# Patient Record
Sex: FEMALE | Race: BLACK OR AFRICAN AMERICAN | NOT HISPANIC OR LATINO | Employment: FULL TIME | URBAN - METROPOLITAN AREA
[De-identification: names, ages, dates, MRNs, and addresses within clinical notes are randomized per-mention and may not be internally consistent; named-entity substitution may affect disease eponyms.]

---

## 2017-01-03 ENCOUNTER — TELEPHONE (OUTPATIENT)
Dept: ORTHOPEDICS | Facility: CLINIC | Age: 57
End: 2017-01-03

## 2017-01-03 NOTE — TELEPHONE ENCOUNTER
Received a call from pt.   States she is ready to attend PT but now needs new orders for PT.   Pt would like to attend PT at [facility] and [facility].   Pending new orders from Zaina Stanley PA-C.

## 2017-01-03 NOTE — TELEPHONE ENCOUNTER
----- Message from Lele Brady sent at 1/3/2017  8:30 AM CST -----  Contact: self/home  Pt would like to speak with you regarding an updated PT referral.

## 2017-01-23 ENCOUNTER — PATIENT MESSAGE (OUTPATIENT)
Dept: INTERNAL MEDICINE | Facility: CLINIC | Age: 57
End: 2017-01-23

## 2017-01-23 ENCOUNTER — TELEPHONE (OUTPATIENT)
Dept: INTERNAL MEDICINE | Facility: CLINIC | Age: 57
End: 2017-01-23

## 2017-01-23 NOTE — TELEPHONE ENCOUNTER
Spoke with the patient and she states she has been having some hair loss, Patient states she has some things to treat her hair until her appointment. Termed the call

## 2017-01-23 NOTE — TELEPHONE ENCOUNTER
----- Message from Angelo Hernandez MA sent at 1/23/2017 11:19 AM CST -----  Contact: [phone]  Pt stated that she has had a lot of hair loss recently and she would like to speak with the Dr regarding this matter. Please advise and call. Thanks!

## 2017-01-31 ENCOUNTER — TELEPHONE (OUTPATIENT)
Dept: INTERNAL MEDICINE | Facility: CLINIC | Age: 57
End: 2017-01-31

## 2017-01-31 ENCOUNTER — LAB VISIT (OUTPATIENT)
Dept: LAB | Facility: HOSPITAL | Age: 57
End: 2017-01-31
Attending: INTERNAL MEDICINE
Payer: MEDICAID

## 2017-01-31 ENCOUNTER — OFFICE VISIT (OUTPATIENT)
Dept: INTERNAL MEDICINE | Facility: CLINIC | Age: 57
End: 2017-01-31
Payer: MEDICAID

## 2017-01-31 VITALS
SYSTOLIC BLOOD PRESSURE: 138 MMHG | WEIGHT: 171.5 LBS | DIASTOLIC BLOOD PRESSURE: 86 MMHG | HEART RATE: 70 BPM | HEIGHT: 64 IN | TEMPERATURE: 98 F | BODY MASS INDEX: 29.28 KG/M2 | RESPIRATION RATE: 16 BRPM

## 2017-01-31 DIAGNOSIS — R53.83 FATIGUE, UNSPECIFIED TYPE: ICD-10-CM

## 2017-01-31 DIAGNOSIS — R53.83 FATIGUE, UNSPECIFIED TYPE: Primary | ICD-10-CM

## 2017-01-31 DIAGNOSIS — Z11.59 NEED FOR HEPATITIS C SCREENING TEST: ICD-10-CM

## 2017-01-31 LAB
25(OH)D3+25(OH)D2 SERPL-MCNC: 23 NG/ML
ALBUMIN SERPL BCP-MCNC: 3.8 G/DL
ALP SERPL-CCNC: 75 U/L
ALT SERPL W/O P-5'-P-CCNC: 25 U/L
ANION GAP SERPL CALC-SCNC: 10 MMOL/L
AST SERPL-CCNC: 26 U/L
BASOPHILS # BLD AUTO: 0.01 K/UL
BASOPHILS NFR BLD: 0.1 %
BILIRUB SERPL-MCNC: 0.3 MG/DL
BUN SERPL-MCNC: 16 MG/DL
CALCIUM SERPL-MCNC: 10.5 MG/DL
CHLORIDE SERPL-SCNC: 101 MMOL/L
CO2 SERPL-SCNC: 28 MMOL/L
CREAT SERPL-MCNC: 0.8 MG/DL
DIFFERENTIAL METHOD: NORMAL
EOSINOPHIL # BLD AUTO: 0.1 K/UL
EOSINOPHIL NFR BLD: 1.1 %
ERYTHROCYTE [DISTWIDTH] IN BLOOD BY AUTOMATED COUNT: 13.9 %
EST. GFR  (AFRICAN AMERICAN): >60 ML/MIN/1.73 M^2
EST. GFR  (NON AFRICAN AMERICAN): >60 ML/MIN/1.73 M^2
FERRITIN SERPL-MCNC: 89 NG/ML
FOLATE SERPL-MCNC: 7.6 NG/ML
GLUCOSE SERPL-MCNC: 100 MG/DL
HCT VFR BLD AUTO: 38.2 %
HGB BLD-MCNC: 12.5 G/DL
IRON SERPL-MCNC: 99 UG/DL
LYMPHOCYTES # BLD AUTO: 3.3 K/UL
LYMPHOCYTES NFR BLD: 43.5 %
MCH RBC QN AUTO: 28.9 PG
MCHC RBC AUTO-ENTMCNC: 32.7 %
MCV RBC AUTO: 88 FL
MONOCYTES # BLD AUTO: 0.4 K/UL
MONOCYTES NFR BLD: 5.5 %
NEUTROPHILS # BLD AUTO: 3.8 K/UL
NEUTROPHILS NFR BLD: 49.7 %
PLATELET # BLD AUTO: 317 K/UL
PMV BLD AUTO: 9.6 FL
POTASSIUM SERPL-SCNC: 3.4 MMOL/L
PROT SERPL-MCNC: 7.8 G/DL
RBC # BLD AUTO: 4.33 M/UL
SATURATED IRON: 22 %
SODIUM SERPL-SCNC: 139 MMOL/L
TOTAL IRON BINDING CAPACITY: 459 UG/DL
TRANSFERRIN SERPL-MCNC: 310 MG/DL
TSH SERPL DL<=0.005 MIU/L-ACNC: 2.49 UIU/ML
VIT B12 SERPL-MCNC: >2000 PG/ML
WBC # BLD AUTO: 7.57 K/UL

## 2017-01-31 PROCEDURE — 82746 ASSAY OF FOLIC ACID SERUM: CPT

## 2017-01-31 PROCEDURE — 82728 ASSAY OF FERRITIN: CPT

## 2017-01-31 PROCEDURE — 36415 COLL VENOUS BLD VENIPUNCTURE: CPT | Mod: PO

## 2017-01-31 PROCEDURE — 86803 HEPATITIS C AB TEST: CPT

## 2017-01-31 PROCEDURE — 82607 VITAMIN B-12: CPT

## 2017-01-31 PROCEDURE — 84443 ASSAY THYROID STIM HORMONE: CPT

## 2017-01-31 PROCEDURE — 80053 COMPREHEN METABOLIC PANEL: CPT

## 2017-01-31 PROCEDURE — 99213 OFFICE O/P EST LOW 20 MIN: CPT | Mod: S$PBB,,, | Performed by: INTERNAL MEDICINE

## 2017-01-31 PROCEDURE — 85025 COMPLETE CBC W/AUTO DIFF WBC: CPT

## 2017-01-31 PROCEDURE — 83540 ASSAY OF IRON: CPT

## 2017-01-31 PROCEDURE — 82306 VITAMIN D 25 HYDROXY: CPT

## 2017-01-31 PROCEDURE — 99999 PR PBB SHADOW E&M-EST. PATIENT-LVL III: CPT | Mod: PBBFAC,,, | Performed by: INTERNAL MEDICINE

## 2017-01-31 NOTE — PROGRESS NOTES
CC: hair loss  HPI:  The patient is a 56 y.o. year old female who presents to the office for hair loss.  She reports significant hair loss over the last 6 months.  She also complains of fatigue.  The patient reports she has been under a lot of stress.    PAST MEDICAL HISTORY:  Past Medical History   Diagnosis Date    ALLERGIC RHINITIS     Anemia     Anxiety     Cancer      right neck    Transient elevated blood pressure        SURGICAL HISTORY:  Past Surgical History   Procedure Laterality Date    Cancer neck surgery      Tubal ligation         MEDS:  Medcard reviewed and updated    ALLERGIES: Allergy Card reviewed and updated    SOCIAL HISTORY:   The patient is a nonsmoker.    PE:   APPEARANCE: Well nourished, well developed, in no acute distress.    CHEST: Lungs clear to auscultation with unlabored respirations.  CARDIOVASCULAR: Normal S1, S2. No murmurs. No carotid bruits. No pedal edema.  ABDOMEN: Bowel sounds normal. Not distended. Soft. No tenderness or masses.   PSYCHIATRIC: The patient is oriented to person, place, and time and has a pleasant affect.        ASSESSMENT/PLAN:  Irene was seen today for hair loss.    Diagnoses and all orders for this visit:    Fatigue, unspecified type  -     TSH; Future  -     CBC auto differential; Future  -     Iron and TIBC; Future  -     Ferritin; Future  -     Vitamin B12; Future  -     Vitamin D; Future  -     Folate; Future

## 2017-01-31 NOTE — MR AVS SNAPSHOT
[facility]   [facility]  [place] [address]  Phone: [phone]  Fax: [phone]                  Irene Lawton   2017 11:40 AM   Office Visit    Description:  Female : 1960   Provider:  Aury Sequeira MD   Department:  [facility]           Reason for Visit     Hair Loss           Diagnoses this Visit        Comments    Fatigue, unspecified type    -  Primary            To Do List           Goals (5 Years of Data)     None      Follow-Up and Disposition     Return in about 3 months (around 2017).      [facility]     [facility] Care Line -  Assistance  Registered nurses in the [facility] On Call Center provide clinical advisement, health education, appointment booking, and other advisory services.  Call for this free service at [phone].             Medications           Message regarding Medications     Verify the changes and/or additions to your medication regime listed below are the same as discussed with your clinician today.  If any of these changes or additions are incorrect, please notify your healthcare provider.             Verify that the below list of medications is an accurate representation of the medications you are currently taking.  If none reported, the list may be blank. If incorrect, please contact your healthcare provider. Carry this list with you in case of emergency.           Current Medications     albuterol 90 mcg/actuation inhaler Inhale 2 puffs into the lungs every 6 (six) hours as needed (cough).    doxycycline (VIBRA-TABS) 100 MG tablet Take 1 tablet (100 mg total) by mouth 2 (two) times daily.    fexofenadine (ALLEGRA ODT) 30 mg disintegrating tablet Take by mouth.    fluticasone (FLONASE) 50 mcg/actuation nasal spray 1 spray by Each Nare route once daily.    hydrochlorothiazide (HYDRODIURIL) 25 MG tablet TAKE ONE TABLET BY MOUTH ONCE DAILY    ibuprofen (ADVIL,MOTRIN) 800 MG  "tablet Take 1 tablet (800 mg total) by mouth 2 (two) times daily as needed for Pain.    MULTIVITAMIN ORAL Take by mouth.           Clinical Reference Information           Vital Signs - Last Recorded  Most recent update: 1/31/2017 12:07 PM by Jil Titus LPN    BP Pulse Temp Resp Ht Wt    138/86 (BP Location: Left arm, Patient Position: Sitting, BP Method: Manual) 70 98.2 °F (36.8 °C) (Oral) 16 5' 4" (1.626 m) 77.8 kg (171 lb 8.3 oz)    LMP BMI             06/27/2012 29.44 kg/m2         Blood Pressure          Most Recent Value    BP  138/86      Allergies as of 1/31/2017     No Known Allergies      Immunizations Administered on Date of Encounter - 1/31/2017     None      Orders Placed During Today's Visit     Future Labs/Procedures Expected by Expires    CBC auto differential  1/31/2017 4/1/2018    Ferritin  1/31/2017 1/31/2018    Folate  1/31/2017 4/1/2018    Iron and TIBC  1/31/2017 1/31/2018    TSH  1/31/2017 4/1/2018    Vitamin B12  1/31/2017 4/1/2018    Vitamin D  1/31/2017 1/31/2018      "

## 2017-01-31 NOTE — TELEPHONE ENCOUNTER
----- Message from Kirill Katz sent at 1/31/2017 11:50 AM CST -----  Contact: self [phone]  Pt in traffic going to be late, pt was advise that will be up to doctor to see her

## 2017-02-01 LAB — HCV AB SERPL QL IA: NEGATIVE

## 2017-02-03 ENCOUNTER — TELEPHONE (OUTPATIENT)
Dept: INTERNAL MEDICINE | Facility: CLINIC | Age: 57
End: 2017-02-03

## 2017-02-03 NOTE — TELEPHONE ENCOUNTER
Please informed patient that labs are significant for mildly depressed vitamin D and potassium level.  Recommend patient drink orange juice daily or eat a banana daily to replace potassium.  The decrease potassium is due to hydrochlorothiazide.  Also, recommend patient take vitamin D3 1000 international units daily.  Vitamin B12 is supratherapeutic.  No intervention is required.  Vitamin B12 is water-soluble, and excess will be eliminated in the urine.

## 2017-02-03 NOTE — TELEPHONE ENCOUNTER
----- Message from Kirill Katz sent at 2/3/2017  9:29 AM CST -----  Contact: self   Pt state she didn't understand her test result 1/31/17 and will like to discuss results or get a clear understanding.    Please advise pt

## 2017-03-09 ENCOUNTER — PATIENT MESSAGE (OUTPATIENT)
Dept: INTERNAL MEDICINE | Facility: CLINIC | Age: 57
End: 2017-03-09

## 2017-03-10 ENCOUNTER — TELEPHONE (OUTPATIENT)
Dept: ORTHOPEDICS | Facility: CLINIC | Age: 57
End: 2017-03-10

## 2017-03-10 ENCOUNTER — DOCUMENTATION ONLY (OUTPATIENT)
Dept: ORTHOPEDICS | Facility: CLINIC | Age: 57
End: 2017-03-10

## 2017-03-10 DIAGNOSIS — M25.562 KNEE PAIN, LEFT ANTERIOR: Primary | ICD-10-CM

## 2017-03-10 RX ORDER — AZITHROMYCIN 250 MG/1
250 TABLET, FILM COATED ORAL DAILY
Qty: 6 TABLET | Refills: 0 | Status: SHIPPED | OUTPATIENT
Start: 2017-03-10 | End: 2017-03-15

## 2017-03-10 RX ORDER — FLUTICASONE PROPIONATE 50 MCG
1 SPRAY, SUSPENSION (ML) NASAL DAILY
Qty: 1 BOTTLE | Refills: 3 | Status: SHIPPED | OUTPATIENT
Start: 2017-03-10 | End: 2017-09-13 | Stop reason: SDUPTHER

## 2017-03-10 NOTE — TELEPHONE ENCOUNTER
----- Message from Brady Monzon sent at 3/10/2017 10:30 AM CST -----  Contact: patient  Pt request a call regarding questions about her physical therapy referral

## 2017-03-10 NOTE — PROGRESS NOTES
Therapy orders fax to [facility] office # [phone] fax # [phone]. Done.

## 2017-03-13 ENCOUNTER — TELEPHONE (OUTPATIENT)
Dept: ORTHOPEDICS | Facility: CLINIC | Age: 57
End: 2017-03-13

## 2017-03-13 NOTE — TELEPHONE ENCOUNTER
----- Message from Lele Brady sent at 3/13/2017  2:14 PM CDT -----  Contact: self/home  Pt called in stating that her insurance has changed to medicaid and the facility where her referral was sent does not accept her insurance. Pt also stated that the she would like the referral to be sent to another facility who would accept her insurance.

## 2017-03-13 NOTE — TELEPHONE ENCOUNTER
Spoke with pt.   Pt will locate a facility that accepts her insurance and advise us of the name of facility.

## 2017-03-15 ENCOUNTER — PATIENT MESSAGE (OUTPATIENT)
Dept: ORTHOPEDICS | Facility: CLINIC | Age: 57
End: 2017-03-15

## 2017-04-04 ENCOUNTER — TELEPHONE (OUTPATIENT)
Dept: INTERNAL MEDICINE | Facility: CLINIC | Age: 57
End: 2017-04-04

## 2017-04-04 NOTE — TELEPHONE ENCOUNTER
----- Message from Shani Orellana sent at 4/4/2017  4:00 PM CDT -----  Contact: call [phone]  Patient would like to get a referral.  Does the patient already have the specialty clinic appointment scheduled:    If yes, what date is the appointment scheduled:     Referral to what specialty:  Physical therapy   Reason (be specific):  Knee  Left   Does the patient want the referral with a specific physician:    Is this an [facility]:    Comments:  Pt needs a location that will accept her medicaid insurance

## 2017-04-04 NOTE — TELEPHONE ENCOUNTER
Called and spoke with the patient and she states she will try and fine the Ortho online that accepts her insurance and she will also check other resources. Termed the call

## 2017-04-07 ENCOUNTER — TELEPHONE (OUTPATIENT)
Dept: ORTHOPEDICS | Facility: CLINIC | Age: 57
End: 2017-04-07

## 2017-04-07 NOTE — TELEPHONE ENCOUNTER
Therapy orders fax to [facility] [phone]. Pt states that [facility] will accept her insurance. Done.

## 2017-04-07 NOTE — TELEPHONE ENCOUNTER
----- Message from Mariya Velasco MA sent at 4/7/2017 11:48 AM CDT -----  Contact: self/home      ----- Message -----     From: Lele Brady     Sent: 4/7/2017  10:40 AM       To: Jaden Mahajan Staff    Pt would like a request to be faxed over to [facility]. The fax number is [phone]. Pt was unsure what type of request needed to be faxed over.

## 2017-04-13 ENCOUNTER — TELEPHONE (OUTPATIENT)
Dept: ORTHOPEDICS | Facility: CLINIC | Age: 57
End: 2017-04-13

## 2017-04-13 NOTE — TELEPHONE ENCOUNTER
Notified pt that we fax new orders for therapy to [facility] fax # [phone]. Patient states verbal understanding and has no further questions.

## 2017-04-13 NOTE — TELEPHONE ENCOUNTER
----- Message from Mariya Velasco MA sent at 4/13/2017  2:05 PM CDT -----  Contact: SELF      ----- Message -----     From: Brady Monzon     Sent: 4/13/2017   1:47 PM       To: Jaden Mahajan Staff    Pt request a call

## 2017-04-17 ENCOUNTER — DOCUMENTATION ONLY (OUTPATIENT)
Dept: ORTHOPEDICS | Facility: CLINIC | Age: 57
End: 2017-04-17

## 2017-04-17 DIAGNOSIS — M25.562 KNEE PAIN, LEFT ANTERIOR: Primary | ICD-10-CM

## 2017-04-21 RX ORDER — HYDROCHLOROTHIAZIDE 25 MG/1
TABLET ORAL
Qty: 30 TABLET | Refills: 3 | Status: SHIPPED | OUTPATIENT
Start: 2017-04-21 | End: 2017-08-25 | Stop reason: SDUPTHER

## 2017-04-26 ENCOUNTER — TELEPHONE (OUTPATIENT)
Dept: ORTHOPEDICS | Facility: CLINIC | Age: 57
End: 2017-04-26

## 2017-04-26 NOTE — TELEPHONE ENCOUNTER
----- Message from Jannette Clarke sent at 4/26/2017 12:06 PM CDT -----  Contact: [facility]  Kirsten received the referral for pt and is requesting any notes regarding the patient. Contact information: [phone] Fax: [phone]

## 2017-07-13 ENCOUNTER — TELEPHONE (OUTPATIENT)
Dept: INTERNAL MEDICINE | Facility: CLINIC | Age: 57
End: 2017-07-13

## 2017-07-13 ENCOUNTER — PATIENT MESSAGE (OUTPATIENT)
Dept: INTERNAL MEDICINE | Facility: CLINIC | Age: 57
End: 2017-07-13

## 2017-07-13 DIAGNOSIS — Z12.11 COLON CANCER SCREENING: ICD-10-CM

## 2017-07-13 DIAGNOSIS — Z12.31 VISIT FOR SCREENING MAMMOGRAM: Primary | ICD-10-CM

## 2017-07-13 NOTE — TELEPHONE ENCOUNTER
Called to advise the patient I have sent the request to Dr Sequeira and there was no answer termed the call

## 2017-07-13 NOTE — TELEPHONE ENCOUNTER
----- Message from Susi Vogel sent at 7/11/2017  2:16 PM CDT -----  Contact: Pt  Pt called to speak to the nurse to request an order for her annual mammogram and colonoscopy and would like a call back once the order has been put into the system.    Pt can be reached [phone].    Thanks

## 2017-07-14 ENCOUNTER — PATIENT MESSAGE (OUTPATIENT)
Dept: INTERNAL MEDICINE | Facility: CLINIC | Age: 57
End: 2017-07-14

## 2017-07-17 ENCOUNTER — TELEPHONE (OUTPATIENT)
Dept: INTERNAL MEDICINE | Facility: CLINIC | Age: 57
End: 2017-07-17

## 2017-07-17 ENCOUNTER — PATIENT MESSAGE (OUTPATIENT)
Dept: INTERNAL MEDICINE | Facility: CLINIC | Age: 57
End: 2017-07-17

## 2017-07-21 ENCOUNTER — TELEPHONE (OUTPATIENT)
Dept: ENDOSCOPY | Facility: HOSPITAL | Age: 57
End: 2017-07-21

## 2017-07-28 ENCOUNTER — HOSPITAL ENCOUNTER (OUTPATIENT)
Dept: RADIOLOGY | Facility: HOSPITAL | Age: 57
Discharge: HOME | End: 2017-07-28
Attending: INTERNAL MEDICINE
Payer: MEDICAID

## 2017-07-28 VITALS — WEIGHT: 171 LBS | BODY MASS INDEX: 29.19 KG/M2 | HEIGHT: 64 IN

## 2017-07-28 DIAGNOSIS — Z12.31 VISIT FOR SCREENING MAMMOGRAM: ICD-10-CM

## 2017-07-28 PROCEDURE — 77063 BREAST TOMOSYNTHESIS BI: CPT | Mod: 26,,, | Performed by: RADIOLOGY

## 2017-07-28 PROCEDURE — 77067 SCR MAMMO BI INCL CAD: CPT | Mod: TC

## 2017-07-28 PROCEDURE — 77067 SCR MAMMO BI INCL CAD: CPT | Mod: 26,,, | Performed by: RADIOLOGY

## 2017-08-28 RX ORDER — HYDROCHLOROTHIAZIDE 25 MG/1
TABLET ORAL
Qty: 30 TABLET | Refills: 3 | Status: SHIPPED | OUTPATIENT
Start: 2017-08-28 | End: 2017-09-13 | Stop reason: SDUPTHER

## 2017-08-30 ENCOUNTER — TELEPHONE (OUTPATIENT)
Dept: INTERNAL MEDICINE | Facility: CLINIC | Age: 57
End: 2017-08-30

## 2017-08-30 NOTE — TELEPHONE ENCOUNTER
----- Message from Kirill Katz sent at 8/30/2017  9:16 AM CDT -----  Contact: self   Patient would like to get medical advice.  Symptoms (please be specific):  Cough, drip, sinus   How long has patient had these symptoms:   1 1/2  Pharmacy name and phone #: [id number] - [phone] (Phone)  [phone] (Fax)  Any drug allergies:  None   Comments: Pt is on Flonase, flonase not working

## 2017-09-08 ENCOUNTER — OFFICE VISIT (OUTPATIENT)
Dept: OBSTETRICS AND GYNECOLOGY | Facility: CLINIC | Age: 57
End: 2017-09-08
Payer: MEDICAID

## 2017-09-08 VITALS
BODY MASS INDEX: 30.34 KG/M2 | DIASTOLIC BLOOD PRESSURE: 74 MMHG | WEIGHT: 177.69 LBS | HEIGHT: 64 IN | SYSTOLIC BLOOD PRESSURE: 118 MMHG

## 2017-09-08 DIAGNOSIS — L91.8 SKIN TAG: ICD-10-CM

## 2017-09-08 DIAGNOSIS — Z01.419 VISIT FOR GYNECOLOGIC EXAMINATION: Primary | ICD-10-CM

## 2017-09-08 DIAGNOSIS — Z78.0 POSTMENOPAUSE: ICD-10-CM

## 2017-09-08 PROCEDURE — 99999 PR PBB SHADOW E&M-EST. PATIENT-LVL III: CPT | Mod: PBBFAC,,, | Performed by: NURSE PRACTITIONER

## 2017-09-08 PROCEDURE — 99213 OFFICE O/P EST LOW 20 MIN: CPT | Mod: PBBFAC | Performed by: NURSE PRACTITIONER

## 2017-09-08 PROCEDURE — 99396 PREV VISIT EST AGE 40-64: CPT | Mod: S$PBB,,, | Performed by: NURSE PRACTITIONER

## 2017-09-08 NOTE — PROGRESS NOTES
HISTORY OF PRESENT ILLNESS:    Irene Lawton is a 57 y.o. female , presents for a routine exam and has no gyn complaints.      Past Medical History:   Diagnosis Date    ALLERGIC RHINITIS     Anemia     Anxiety     Cancer     right neck    Transient elevated blood pressure        Past Surgical History:   Procedure Laterality Date    cancer neck surgery      TUBAL LIGATION          MEDICATIONS AND ALLERGIES:      Current Outpatient Prescriptions:     albuterol 90 mcg/actuation inhaler, Inhale 2 puffs into the lungs every 6 (six) hours as needed (cough)., Disp: 18 g, Rfl: 01    fluticasone (FLONASE) 50 mcg/actuation nasal spray, 1 spray by Each Nare route once daily., Disp: 1 Bottle, Rfl: 3    GUAIFENESIN/PSEUDOEPHEDRNE HCL (MUCINEX D ORAL), Take by mouth., Disp: , Rfl:     hydrochlorothiazide (HYDRODIURIL) 25 MG tablet, TAKE ONE TABLET BY MOUTH ONCE DAILY, Disp: 30 tablet, Rfl: 3    ibuprofen (ADVIL,MOTRIN) 800 MG tablet, Take 1 tablet (800 mg total) by mouth 2 (two) times daily as needed for Pain., Disp: 180 tablet, Rfl: 1    MULTIVITAMIN ORAL, Take by mouth., Disp: , Rfl:     Review of patient's allergies indicates:   Allergen Reactions    No known allergies        Family History   Problem Relation Age of Onset    Hypertension Mother     Breast cancer Mother     Colon cancer Neg Hx     Ovarian cancer Neg Hx        Social History     Social History    Marital status: Single     Spouse name: N/A    Number of children: 2    Years of education: 14 years     Occupational History    [id number]     Social History Main Topics    Smoking status: Never Smoker    Smokeless tobacco: Never Used    Alcohol use Yes      Comment: Social    Drug use: No    Sexual activity: Yes     Partners: Male     Birth control/ protection: Surgical     Other Topics Concern    Not on file     Social History Narrative    Works at [employer] and does not exercise regularly       OB HISTORY:  "Number of vaginal deliveries:2    COMPREHENSIVE GYN HISTORY:  PAP History:  Denies abnormal Paps. LAST PAP 5-28-15 NORMAL.  Infection History: Denies STDs. Denies PID.  Benign History: Denies uterine fibroids. Denies ovarian cysts. Denies endometriosis.  Denies other conditions.  Cancer History: Denies cervical cancer. Denies uterine cancer or hyperplasia. Denies ovarian cancer. Denies vulvar cancer or pre-cancer. Denies vaginal cancer or pre-cancer. Denies breast cancer. Denies colon cancer.  Sexual Activity History: Reports currently being sexually active  Menstrual History: Denies menses. Pt is  2012 not on HRT.     ROS:  GENERAL: No weight changes. No swelling. No fatigue. No fever.  CARDIOVASCULAR: No chest pain. No shortness of breath. No leg cramps.   NEUROLOGICAL: No headaches. No vision changes.  BREASTS: No pain. No lumps. No discharge.  ABDOMEN: No pain. No nausea. No vomiting. No diarrhea. No constipation.  REPRODUCTIVE: No abnormal bleeding.   VULVA: No pain. No lesions. No itching.  VAGINA: No relaxation. No itching. No odor. No discharge. No lesions.  URINARY: No incontinence. No nocturia. No frequency. No dysuria.    /74   Ht 5' 4" (1.626 m)   Wt 80.6 kg (177 lb 11.1 oz)   LMP 06/27/2012   BMI 30.50 kg/m²     PE:  APPEARANCE: Well nourished, well developed, in no acute distress.  AFFECT: WNL, alert and oriented x 3.  SKIN: No hirsutism. No acne.  NECK: Neck symmetric without masses or thyromegaly.  NODES: No inguinal, cervical, axillary or femoral lymph node enlargement.  CHEST: Good respiratory effort.   ABDOMEN: Soft. No tenderness or masses.   BREASTS: PENDULOUS, Symmetrical, no skin changes or visible lesions. No palpable masses, nipple discharge bilaterally.  PELVIC: ATROPHIC EXTERNAL FEMALE GENITALIA with a NON TENDER, NOT INFLAMED PINPOINT FLESHY SKIN TAG LEFT MID GROIN PANTY LINE. Normal hair distribution. Adequate perineal body, normal urethral meatus. VAGINA ATROHPIC without " lesions. CERVIX STENOTIC without lesions, discharge or tenderness. No significant cystocele or rectocele. Bimanual exam shows uterus to be normal size, regular, mobile and nontender. Adnexa without masses or tenderness.  RECTAL: Rectovaginal exam confirms above with normal sphincter tone, no masses.  EXTREMITIES: No edema.    DIAGNOSIS:  1. Visit for gynecologic examination    2. Postmenopause    3. Skin tag        PLAN:     LABS AND TESTS ORDERED:  Up to date on mammogram and colonoscopy    MEDICATIONS PRESCRIBED:  None    COUNSELING:  The patient was counseled today on:  -option for removal of skin tag, which pt declined, but will RTC if it enlarges or begins to bother her (catch on her underwear, bleed, etc);  -osteoporosis prevention, calcium supplementation, regular weight bearing exercise;  -A.C.S. Pap and pelvic exam guidelines (pap every 3 years, no pap after age 65) and recommendations for yearly mammogram;  -to see her PCP for other health maintenance.    FOLLOW-UP with me in two years.

## 2017-09-13 ENCOUNTER — OFFICE VISIT (OUTPATIENT)
Dept: INTERNAL MEDICINE | Facility: CLINIC | Age: 57
End: 2017-09-13
Payer: MEDICAID

## 2017-09-13 VITALS
WEIGHT: 175.06 LBS | HEART RATE: 67 BPM | DIASTOLIC BLOOD PRESSURE: 78 MMHG | HEIGHT: 64 IN | OXYGEN SATURATION: 98 % | SYSTOLIC BLOOD PRESSURE: 138 MMHG | BODY MASS INDEX: 29.89 KG/M2 | TEMPERATURE: 99 F

## 2017-09-13 DIAGNOSIS — J01.90 ACUTE SINUSITIS, RECURRENCE NOT SPECIFIED, UNSPECIFIED LOCATION: Primary | ICD-10-CM

## 2017-09-13 DIAGNOSIS — M54.9 BACK PAIN, UNSPECIFIED BACK LOCATION, UNSPECIFIED BACK PAIN LATERALITY, UNSPECIFIED CHRONICITY: ICD-10-CM

## 2017-09-13 PROCEDURE — 3078F DIAST BP <80 MM HG: CPT | Mod: ,,, | Performed by: INTERNAL MEDICINE

## 2017-09-13 PROCEDURE — 99214 OFFICE O/P EST MOD 30 MIN: CPT | Mod: S$PBB,,, | Performed by: INTERNAL MEDICINE

## 2017-09-13 PROCEDURE — 99213 OFFICE O/P EST LOW 20 MIN: CPT | Mod: PBBFAC,PO | Performed by: INTERNAL MEDICINE

## 2017-09-13 PROCEDURE — 99999 PR PBB SHADOW E&M-EST. PATIENT-LVL III: CPT | Mod: PBBFAC,,, | Performed by: INTERNAL MEDICINE

## 2017-09-13 PROCEDURE — 3008F BODY MASS INDEX DOCD: CPT | Mod: ,,, | Performed by: INTERNAL MEDICINE

## 2017-09-13 PROCEDURE — 3075F SYST BP GE 130 - 139MM HG: CPT | Mod: ,,, | Performed by: INTERNAL MEDICINE

## 2017-09-13 RX ORDER — FLUTICASONE PROPIONATE 50 MCG
1 SPRAY, SUSPENSION (ML) NASAL DAILY
Qty: 1 BOTTLE | Refills: 3 | Status: SHIPPED | OUTPATIENT
Start: 2017-09-13 | End: 2019-09-23 | Stop reason: SDUPTHER

## 2017-09-13 RX ORDER — TIZANIDINE 4 MG/1
4 TABLET ORAL NIGHTLY
Qty: 30 TABLET | Refills: 3 | Status: SHIPPED | OUTPATIENT
Start: 2017-09-13 | End: 2018-08-13

## 2017-09-13 RX ORDER — HYDROCHLOROTHIAZIDE 25 MG/1
25 TABLET ORAL DAILY
Qty: 30 TABLET | Refills: 3 | Status: SHIPPED | OUTPATIENT
Start: 2017-09-13 | End: 2018-07-24 | Stop reason: SDUPTHER

## 2017-09-13 RX ORDER — AMOXICILLIN 875 MG/1
875 TABLET, FILM COATED ORAL EVERY 12 HOURS
Qty: 20 TABLET | Refills: 0 | Status: SHIPPED | OUTPATIENT
Start: 2017-09-13 | End: 2018-07-24

## 2017-09-13 NOTE — PROGRESS NOTES
CC: sinusitis  HPI:  The patient is a 57 y.o. year old female who presents to the office for sinusitis.  she complains of nasal congestion, postnasal drip, rhinorrhea and headache.  Symptoms started about 2 weeks ago.  she also reports cough.  The patient has taken Mucinex cold and sinus.  She states her symptoms have improved.  She is drinking green tea.  The patient complains of low back pain after doing a lot of heavy lifting at work.    PAST MEDICAL HISTORY:  Past Medical History:   Diagnosis Date    ALLERGIC RHINITIS     Anemia     Anxiety     Cancer     right neck    Transient elevated blood pressure        SURGICAL HISTORY:  Past Surgical History:   Procedure Laterality Date    cancer neck surgery      TUBAL LIGATION         MEDS:  Medcard reviewed and updated    ALLERGIES: Allergy Card reviewed and updated    SOCIAL HISTORY:   The patient is a nonsmoker.    PE:   APPEARANCE: Well nourished, well developed, in no acute distress.    CHEST: Lungs clear to auscultation with unlabored respirations.  CARDIOVASCULAR: Normal S1, S2. No murmurs. No carotid bruits. No pedal edema.  ABDOMEN: Bowel sounds normal. Not distended. Soft. No tenderness or masses.  MUSCULOSKELETAL:  Normal gait, positive tenderness to palpation to lower back.  PSYCHIATRIC: The patient is oriented to person, place, and time and has a pleasant affect.        ASSESSMENT/PLAN:  Irene was seen today for sinusitis and cough.    Diagnoses and all orders for this visit:    Acute sinusitis, recurrence not specified, unspecified location  -     Prescribe amoxicillin    Back pain, unspecified back location, unspecified back pain laterality, unspecified chronicity  -    Prescribe zanaflex    Other orders  -     amoxicillin (AMOXIL) 875 MG tablet; Take 1 tablet (875 mg total) by mouth every 12 (twelve) hours.  -     hydrochlorothiazide (HYDRODIURIL) 25 MG tablet; Take 1 tablet (25 mg total) by mouth once daily.  -     fluticasone (FLONASE) 50  mcg/actuation nasal spray; 1 spray by Each Nare route once daily.  -     tizanidine (ZANAFLEX) 4 MG tablet; Take 1 tablet (4 mg total) by mouth every evening.

## 2017-09-29 ENCOUNTER — TELEPHONE (OUTPATIENT)
Dept: INTERNAL MEDICINE | Facility: CLINIC | Age: 57
End: 2017-09-29

## 2017-09-29 RX ORDER — SCOLOPAMINE TRANSDERMAL SYSTEM 1 MG/1
1 PATCH, EXTENDED RELEASE TRANSDERMAL
Qty: 3 PATCH | Refills: 0 | Status: SHIPPED | OUTPATIENT
Start: 2017-09-29 | End: 2019-08-08

## 2017-09-29 NOTE — TELEPHONE ENCOUNTER
Patient states she is going on a cruise and she needs this patch for motion sickness, Please advise

## 2017-09-29 NOTE — TELEPHONE ENCOUNTER
----- Message from BenedictoRomeo Tamar sent at 9/29/2017  8:39 AM CDT -----  Contact: Pt at [phone]  Pt said she is going on a cruise next week and would like medication to be called in to help with motion sickness.     [facility] [place] - [address]  [phone] (Phone)  [phone] (Fax)

## 2017-12-14 RX ORDER — FLUTICASONE PROPIONATE 50 MCG
SPRAY, SUSPENSION (ML) NASAL
Qty: 1 BOTTLE | Refills: 3 | Status: SHIPPED | OUTPATIENT
Start: 2017-12-14 | End: 2018-08-13 | Stop reason: SDUPTHER

## 2018-01-22 RX ORDER — IBUPROFEN 800 MG/1
TABLET ORAL
Qty: 60 TABLET | Refills: 5 | Status: SHIPPED | OUTPATIENT
Start: 2018-01-22 | End: 2018-07-24

## 2018-07-09 ENCOUNTER — TELEPHONE (OUTPATIENT)
Dept: INTERNAL MEDICINE | Facility: CLINIC | Age: 58
End: 2018-07-09

## 2018-07-09 NOTE — TELEPHONE ENCOUNTER
----- Message from Karyna Dolan sent at 7/9/2018  9:58 AM CDT -----  Contact: patient [phone]  Pt called to schedule an appt and refused an appt with another doctor. She said that she works for [employer] now and will have different insurance. She said that  usually orders medicine . She wants something ordered for a sinus infection and also something stronger than Ibuprofen for back pain. Please call her.      [facility] [place] -[phone]

## 2018-07-09 NOTE — TELEPHONE ENCOUNTER
Called and spoke with the patient and she states she needs an appointment for her sinus and she is having extreme back pain appt was scheduled

## 2018-07-18 ENCOUNTER — TELEPHONE (OUTPATIENT)
Dept: INTERNAL MEDICINE | Facility: CLINIC | Age: 58
End: 2018-07-18

## 2018-07-18 DIAGNOSIS — Z12.31 VISIT FOR SCREENING MAMMOGRAM: Primary | ICD-10-CM

## 2018-07-24 ENCOUNTER — OFFICE VISIT (OUTPATIENT)
Dept: INTERNAL MEDICINE | Facility: CLINIC | Age: 58
End: 2018-07-24
Payer: COMMERCIAL

## 2018-07-24 ENCOUNTER — TELEPHONE (OUTPATIENT)
Dept: INTERNAL MEDICINE | Facility: CLINIC | Age: 58
End: 2018-07-24

## 2018-07-24 VITALS
SYSTOLIC BLOOD PRESSURE: 148 MMHG | WEIGHT: 184.5 LBS | DIASTOLIC BLOOD PRESSURE: 81 MMHG | HEART RATE: 63 BPM | BODY MASS INDEX: 32.69 KG/M2 | TEMPERATURE: 99 F | HEIGHT: 63 IN | RESPIRATION RATE: 16 BRPM

## 2018-07-24 DIAGNOSIS — I10 HYPERTENSION, UNSPECIFIED TYPE: ICD-10-CM

## 2018-07-24 DIAGNOSIS — E66.9 OBESITY (BMI 30.0-34.9): ICD-10-CM

## 2018-07-24 DIAGNOSIS — K59.00 CONSTIPATION, UNSPECIFIED CONSTIPATION TYPE: ICD-10-CM

## 2018-07-24 DIAGNOSIS — J01.90 ACUTE SINUSITIS, RECURRENCE NOT SPECIFIED, UNSPECIFIED LOCATION: Primary | ICD-10-CM

## 2018-07-24 PROCEDURE — 99999 PR PBB SHADOW E&M-EST. PATIENT-LVL III: CPT | Mod: PBBFAC,,, | Performed by: INTERNAL MEDICINE

## 2018-07-24 PROCEDURE — 3079F DIAST BP 80-89 MM HG: CPT | Mod: CPTII,S$GLB,, | Performed by: INTERNAL MEDICINE

## 2018-07-24 PROCEDURE — 3008F BODY MASS INDEX DOCD: CPT | Mod: CPTII,S$GLB,, | Performed by: INTERNAL MEDICINE

## 2018-07-24 PROCEDURE — 3077F SYST BP >= 140 MM HG: CPT | Mod: CPTII,S$GLB,, | Performed by: INTERNAL MEDICINE

## 2018-07-24 PROCEDURE — 99214 OFFICE O/P EST MOD 30 MIN: CPT | Mod: S$GLB,,, | Performed by: INTERNAL MEDICINE

## 2018-07-24 RX ORDER — HYDROCHLOROTHIAZIDE 25 MG/1
25 TABLET ORAL DAILY
Qty: 30 TABLET | Refills: 6 | Status: SHIPPED | OUTPATIENT
Start: 2018-07-24 | End: 2019-06-22 | Stop reason: SDUPTHER

## 2018-07-24 RX ORDER — POLYETHYLENE GLYCOL 3350 17 G/17G
17 POWDER, FOR SOLUTION ORAL DAILY
Qty: 238 G | Refills: 3 | Status: SHIPPED | OUTPATIENT
Start: 2018-07-24 | End: 2019-08-08

## 2018-07-24 RX ORDER — MELOXICAM 15 MG/1
15 TABLET ORAL DAILY
Qty: 30 TABLET | Refills: 3 | Status: SHIPPED | OUTPATIENT
Start: 2018-07-24 | End: 2019-08-08

## 2018-07-24 NOTE — TELEPHONE ENCOUNTER
Informed pt meloxicam sent to pharmacy; discontinue ibuprofen; pt verbalized understanding; nothing further

## 2018-07-24 NOTE — TELEPHONE ENCOUNTER
Please inform patient that prescription for meloxicam has been sent to her pharmacy electronically.  Patient should discontinue ibuprofen.

## 2018-07-24 NOTE — PROGRESS NOTES
CC: sinusitis  HPI:  The patient is a 58 y.o. year old female who presents to the office for sinusitis.  she complains of nasal congestion, postnasal drip, rhinorrhea and headache.  Symptoms started about 2 weeks ago.  she also reports mild cough and itching throat, but denies any ear pain or fever.  The patient has taken mucinex and flonase.  She has not taken her HCTZ in about 4 days.  She complains of constipation intermittently.    PAST MEDICAL HISTORY:  Past Medical History:   Diagnosis Date    ALLERGIC RHINITIS     Anemia     Anxiety     Cancer     right neck    Hypertension     Transient elevated blood pressure        SURGICAL HISTORY:  Past Surgical History:   Procedure Laterality Date    cancer neck surgery      TUBAL LIGATION         MEDS:  Medcard reviewed and updated    ALLERGIES: Allergy Card reviewed and updated    SOCIAL HISTORY:   The patient is a nonsmoker.    PE:   APPEARANCE: Obese, in no acute distress.    EARS: TM's intact. No retraction or perforation.    NOSE: Mucosa pink. Airway clear. Positive tenderness to palpation of frontal and right maxillary sinus.  MOUTH & THROAT: No tonsillar enlargement. No pharyngeal erythema or exudate. No stridor.  CHEST: Lungs clear to auscultation with unlabored respirations.  CARDIOVASCULAR: Normal S1, S2. No murmurs. No carotid bruits.  ABDOMEN: Bowel sounds normal. Not distended. Soft. No tenderness or masses. .  PSYCHIATRIC: The patient is oriented to person, place, and time and has a pleasant affect.        ASSESSMENT/PLAN:  Irene was seen today for sinus problem and low-back pain.    Diagnoses and all orders for this visit:    Acute sinusitis, recurrence not specified, unspecified location  -     recommend Claritin and Zyrtec over the counter    Hypertension, unspecified type  -     Comprehensive metabolic panel; Future  -     Hemoglobin A1c; Future  -     Lipid panel; Future  -     TSH; Future  -     blood pressure is mildly elevated, resume  hydrochlorothiazide    Obesity (BMI 30.0-34.9)  -     Comprehensive metabolic panel; Future  -     Hemoglobin A1c; Future  -     Lipid panel; Future  -     TSH; Future  -      encourage weight loss through healthy diet and regular exercise    Constipation, unspecified constipation type  -     start MiraLax daily    Other orders  -     hydroCHLOROthiazide (HYDRODIURIL) 25 MG tablet; Take 1 tablet (25 mg total) by mouth once daily.  -     polyethylene glycol (GLYCOLAX) 17 gram/dose powder; Take 17 g by mouth once daily.

## 2018-07-24 NOTE — TELEPHONE ENCOUNTER
----- Message from Shani Aparicionel sent at 7/24/2018 12:21 PM CDT -----  Contact: call pt at [phone]  Calling to discuss back pain, was suppose to get a stronger medication than her ibuprofen (ADVIL,MOTRIN) 800 MG tablet  called [facility] her [facility] in [place] , wants to know if that was done

## 2018-07-25 ENCOUNTER — LAB VISIT (OUTPATIENT)
Dept: LAB | Facility: OTHER | Age: 58
End: 2018-07-25
Payer: COMMERCIAL

## 2018-07-25 DIAGNOSIS — E66.9 OBESITY (BMI 30.0-34.9): ICD-10-CM

## 2018-07-25 DIAGNOSIS — I10 HYPERTENSION, UNSPECIFIED TYPE: ICD-10-CM

## 2018-07-25 LAB
ALBUMIN SERPL BCP-MCNC: 4 G/DL
ALP SERPL-CCNC: 100 U/L
ALT SERPL W/O P-5'-P-CCNC: 29 U/L
ANION GAP SERPL CALC-SCNC: 9 MMOL/L
AST SERPL-CCNC: 30 U/L
BILIRUB SERPL-MCNC: 0.3 MG/DL
BUN SERPL-MCNC: 12 MG/DL
CALCIUM SERPL-MCNC: 10.7 MG/DL
CHLORIDE SERPL-SCNC: 102 MMOL/L
CHOLEST SERPL-MCNC: 237 MG/DL
CHOLEST/HDLC SERPL: 3.2 {RATIO}
CO2 SERPL-SCNC: 28 MMOL/L
CREAT SERPL-MCNC: 0.8 MG/DL
EST. GFR  (AFRICAN AMERICAN): >60 ML/MIN/1.73 M^2
EST. GFR  (NON AFRICAN AMERICAN): >60 ML/MIN/1.73 M^2
ESTIMATED AVG GLUCOSE: 117 MG/DL
GLUCOSE SERPL-MCNC: 105 MG/DL
HBA1C MFR BLD HPLC: 5.7 %
HDLC SERPL-MCNC: 75 MG/DL
HDLC SERPL: 31.6 %
LDLC SERPL CALC-MCNC: 149.2 MG/DL
NONHDLC SERPL-MCNC: 162 MG/DL
POTASSIUM SERPL-SCNC: 3.9 MMOL/L
PROT SERPL-MCNC: 8 G/DL
SODIUM SERPL-SCNC: 139 MMOL/L
T4 FREE SERPL-MCNC: 0.94 NG/DL
TRIGL SERPL-MCNC: 64 MG/DL
TSH SERPL DL<=0.005 MIU/L-ACNC: 4.29 UIU/ML

## 2018-07-25 PROCEDURE — 36415 COLL VENOUS BLD VENIPUNCTURE: CPT

## 2018-07-25 PROCEDURE — 84443 ASSAY THYROID STIM HORMONE: CPT

## 2018-07-25 PROCEDURE — 80053 COMPREHEN METABOLIC PANEL: CPT

## 2018-07-25 PROCEDURE — 80061 LIPID PANEL: CPT

## 2018-07-25 PROCEDURE — 83036 HEMOGLOBIN GLYCOSYLATED A1C: CPT

## 2018-07-25 PROCEDURE — 84439 ASSAY OF FREE THYROXINE: CPT

## 2018-07-26 ENCOUNTER — PATIENT MESSAGE (OUTPATIENT)
Dept: INTERNAL MEDICINE | Facility: CLINIC | Age: 58
End: 2018-07-26

## 2018-07-26 DIAGNOSIS — R73.03 PREDIABETES: Primary | ICD-10-CM

## 2018-08-01 ENCOUNTER — PATIENT MESSAGE (OUTPATIENT)
Dept: INTERNAL MEDICINE | Facility: CLINIC | Age: 58
End: 2018-08-01

## 2018-08-02 NOTE — TELEPHONE ENCOUNTER
Looks like you saw her 7/24 and recommended otc zyrtec or clartin.  See email.    Can you handle over computer or needs re visit this week?  fyi- Looks like she is scheduled to see new provider 8/13 dr meyer      Please advise.  Thanks chadwick

## 2018-08-09 ENCOUNTER — HOSPITAL ENCOUNTER (OUTPATIENT)
Dept: RADIOLOGY | Facility: OTHER | Age: 58
Discharge: HOME | End: 2018-08-09
Attending: INTERNAL MEDICINE
Payer: COMMERCIAL

## 2018-08-09 DIAGNOSIS — Z12.31 VISIT FOR SCREENING MAMMOGRAM: ICD-10-CM

## 2018-08-09 PROCEDURE — 77067 SCR MAMMO BI INCL CAD: CPT | Mod: TC

## 2018-08-09 PROCEDURE — 77067 SCR MAMMO BI INCL CAD: CPT | Mod: 26,,, | Performed by: RADIOLOGY

## 2018-08-09 PROCEDURE — 77063 BREAST TOMOSYNTHESIS BI: CPT | Mod: 26,,, | Performed by: RADIOLOGY

## 2018-08-13 ENCOUNTER — OFFICE VISIT (OUTPATIENT)
Dept: INTERNAL MEDICINE | Facility: CLINIC | Age: 58
End: 2018-08-13
Attending: FAMILY MEDICINE
Payer: COMMERCIAL

## 2018-08-13 VITALS
HEART RATE: 62 BPM | SYSTOLIC BLOOD PRESSURE: 114 MMHG | HEIGHT: 63 IN | WEIGHT: 182.75 LBS | BODY MASS INDEX: 32.38 KG/M2 | DIASTOLIC BLOOD PRESSURE: 62 MMHG | OXYGEN SATURATION: 98 %

## 2018-08-13 DIAGNOSIS — I10 HYPERTENSION, UNSPECIFIED TYPE: Primary | ICD-10-CM

## 2018-08-13 DIAGNOSIS — E66.01 SEVERE OBESITY: ICD-10-CM

## 2018-08-13 DIAGNOSIS — E88.819 INSULIN RESISTANCE: ICD-10-CM

## 2018-08-13 PROCEDURE — 3008F BODY MASS INDEX DOCD: CPT | Mod: CPTII,S$GLB,, | Performed by: FAMILY MEDICINE

## 2018-08-13 PROCEDURE — 3078F DIAST BP <80 MM HG: CPT | Mod: CPTII,S$GLB,, | Performed by: FAMILY MEDICINE

## 2018-08-13 PROCEDURE — 99214 OFFICE O/P EST MOD 30 MIN: CPT | Mod: S$GLB,,, | Performed by: FAMILY MEDICINE

## 2018-08-13 PROCEDURE — 3074F SYST BP LT 130 MM HG: CPT | Mod: CPTII,S$GLB,, | Performed by: FAMILY MEDICINE

## 2018-08-13 PROCEDURE — 99999 PR PBB SHADOW E&M-EST. PATIENT-LVL V: CPT | Mod: PBBFAC,,, | Performed by: FAMILY MEDICINE

## 2018-08-13 RX ORDER — TOPIRAMATE 25 MG/1
25 CAPSULE, EXTENDED RELEASE ORAL NIGHTLY
Qty: 30 CAPSULE | Refills: 1 | Status: SHIPPED | OUTPATIENT
Start: 2018-08-13 | End: 2019-08-08

## 2018-08-13 NOTE — PROGRESS NOTES
Subjective:      Patient ID: Irene Lawton is a 58 y.o. female.    Chief Complaint: Weight Loss    New pt to me, referred by Dr. Sequeira, with HTN, HLD, prediabetes, constipation . She sleeps about 5-6 hours nightly.     Current attempts at weight loss: portion control for 2 weeks/walking 30 minutes 2-3 times a week     Previous diet attempts: none     History of medication for loss: none     Heaviest weight: 182    Lightest weight: 160    Goal weight: 160    Typical eating patterns: lives at home with mom, two daughters, everyone grocery shops separately, patient goes twice a week   Breakfast:   Skip   Wu/eggs/coffee with creamer/sugar   Cereal (fiber bran)/ soymilk 1/2 cup    Lunch:  Salad (croutons/grilled chicken/eggs/olives/nayely lettuce) ranch      Dinner:  Fried chicken/biscuit or red beans/rice  Cabbage/baked chicken/neck bones     Snacks:  Candy (snickers/almond edita)  Popcorn    Ice cream butter pecan     Beverages:  Water   margaritas (2 weekly)    Willingness to change: 10/10    BMR: 1378        Review of Systems   Constitutional: Negative for activity change, appetite change, chills, diaphoresis, fatigue, fever and unexpected weight change.   HENT: Negative for congestion, ear discharge, ear pain, hearing loss, postnasal drip, rhinorrhea, sinus pressure and sore throat.    Eyes: Negative for visual disturbance.   Respiratory: Negative for cough, shortness of breath and wheezing.    Cardiovascular: Negative for chest pain.   Gastrointestinal: Negative for abdominal pain, constipation, diarrhea, nausea and vomiting.   Genitourinary: Negative for dysuria, frequency, hematuria and vaginal discharge.   Musculoskeletal: Negative.    Skin: Negative.    Neurological: Negative for dizziness, facial asymmetry, light-headedness and headaches.   Psychiatric/Behavioral: Negative for suicidal ideas.     I personally reviewed Past Medical History, Past Surgical history,  Past Social History and Family  "History    Objective:   /62   Pulse 62   Ht 5' 3" (1.6 m)   Wt 82.9 kg (182 lb 12.2 oz)   LMP 06/27/2012   SpO2 98%   BMI 32.37 kg/m²     Physical Exam   Constitutional: She is oriented to person, place, and time. She appears well-developed and well-nourished. No distress.   HENT:   Head: Normocephalic.   Right Ear: External ear normal.   Left Ear: External ear normal.   Mouth/Throat: Oropharynx is clear and moist.   Eyes: Conjunctivae and EOM are normal. Pupils are equal, round, and reactive to light. Right eye exhibits no discharge. Left eye exhibits no discharge. No scleral icterus.   Neck: Normal range of motion. No tracheal deviation present. No thyromegaly present.   Cardiovascular: Normal rate, regular rhythm, normal heart sounds and intact distal pulses. Exam reveals no gallop.   No murmur heard.  Pulmonary/Chest: Effort normal and breath sounds normal. No respiratory distress. She has no wheezes. She has no rales. She exhibits no tenderness.   Abdominal: Soft. Bowel sounds are normal. She exhibits no distension and no mass. There is no tenderness. There is no rebound and no guarding.   Musculoskeletal: Normal range of motion.   Neurological: She is alert and oriented to person, place, and time.   Skin: Skin is warm and dry.   Psychiatric: She has a normal mood and affect. Her behavior is normal. Judgment and thought content normal.   Vitals reviewed.      Irene was seen today for weight loss.    Diagnoses and all orders for this visit:    Hypertension, unspecified type  Insulin resistance  Severe obesity  -return in 4 weeks with food journal, increase sleep and reduce candy   -     Ambulatory consult to Nutrition Services  Patient was informed that topiramate is used for migraine prevention and seizures. Weight loss is a common side effect that is well documented. She understands this. She was informed of the potential side effects such as serious and possibly fatal rash in which case the " medication should be discontinued immediately. Paresthesias, forgetfulness, fatigue, kidney stones, GI symptoms, and changes in lab values such as electrolytes, blood counts and kidney function.          Other orders  -     Cancel: Ambulatory referral to Obstetrics / Gynecology  -     topiramate (TROKENDI XR) 25 mg Cp24; Take 25 mg by mouth every evening.

## 2018-08-13 NOTE — PATIENT INSTRUCTIONS
fl oz water daily   7.5 hours nightly   Do not eat within 2 hours of going to sleep       Topiramate extended-release capsules  What is this medicine?  TOPIRAMATE (toe PYRE a mate) is used to treat seizures in adults or children with epilepsy.  How should I use this medicine?  Take this medicine by mouth with a glass of water. Follow the directions on the prescription label. Trokendi XR capsules must be swallowed whole. Do not sprinkle on food, break, crush, dissolve, or chew. Qudexy XR capsules may be swallowed whole or opened and sprinkled on a small amount of soft food. This mixture must be swallowed immediately. Do not chew or store mixture for later use. You may take this medicine with meals. Take your medicine at regular intervals. Do not take it more often than directed.  Talk to your pediatrician regarding the use of this medicine in children. Special care may be needed. While Trokendi XR may be prescribed for children as young as 6 years and Qudexy XR may be prescribed for children as young as 2 years for selected conditions, precautions do apply.  What side effects may I notice from receiving this medicine?  Side effects that you should report to your doctor or health care professional as soon as possible:  · allergic reactions like skin rash, itching or hives, swelling of the face, lips, or tongue  · decreased sweating and/or rise in body temperature  · depression  · difficulty breathing, fast or irregular breathing patterns  · difficulty speaking  · difficulty walking or controlling muscle movements  · hearing impairment  · redness, blistering, peeling or loosening of the skin, including inside the mouth  · tingling, pain or numbness in the hands or feet  · unusually weak or tired  · worsening of mood, thoughts or actions of suicide or dying  Side effects that usually do not require medical attention (Report these to your doctor or health care professional if they continue or are  bothersome.):  · altered taste  · back pain, joint or muscle aches and pains  · diarrhea, or constipation  · headache  · loss of appetite  · nausea  · stomach upset, indigestion  · tremors  What may interact with this medicine?  Do not take this medicine with any of the following medications:  · probenecid  This medicine may also interact with the following medications:  · acetazolamide  · alcohol  · amitriptyline  · birth control pills  · digoxin  · hydrochlorothiazide  · lithium  · medicines for pain, sleep, or muscle relaxation  · metformin  · methazolamide  · other seizure or epilepsy medicines  · pioglitazone  · risperidone  What if I miss a dose?  If you miss a dose, take it as soon as you can. If it is almost time for your next dose, take only that dose. Do not take double or extra doses.  Where should I keep my medicine?  Keep out of the reach of children.  Store at room temperature between 15 and 30 degrees C (59 and 86 degrees F) in a tightly closed container. Protect from moisture. Throw away any unused medicine after the expiration date.  What should I tell my health care provider before I take this medicine?  They need to know if you have any of these conditions:  · cirrhosis of the liver or liver disease  · diarrhea  · glaucoma  · kidney stones or kidney disease  · lung disease like asthma, obstructive pulmonary disease, emphysema  · metabolic acidosis  · on a ketogenic diet  · scheduled for surgery or a procedure  · suicidal thoughts, plans, or attempt; a previous suicide attempt by you or a family member  · an unusual or allergic reaction to topiramate, other medicines, foods, dyes, or preservatives  · pregnant or trying to get pregnant  · breast-feeding  What should I watch for while using this medicine?  Visit your doctor or health care professional for regular checks on your progress. Do not stop taking this medicine suddenly. This increases the risk of seizures if you are using this medicine to  control epilepsy. Wear a medical identification bracelet or chain to say you have epilepsy or seizures, and carry a card that lists all your medicines.  This medicine can decrease sweating and increase your body temperature. Watch for signs of  sweating or fever, especially in children. Avoid extreme heat, hot baths, and saunas. Be careful about exercising, especially in hot weather. Contact your health care provider right away if you notice a fever or decrease in sweating.  You should drink plenty of fluids while taking this medicine. If you have had kidney stones in the past, this will help to reduce your chances of forming kidney stones.  If you have stomach pain, with nausea or vomiting and yellowing of your eyes or skin, call your doctor immediately.  You may get drowsy, dizzy, or have blurred vision. Do not drive, use machinery, or do anything that needs mental alertness until you know how this medicine affects you. To reduce dizziness, do not sit or stand up quickly, especially if you are an older patient. Alcohol can increase drowsiness and dizziness. Avoid alcoholic drinks. Do not drink alcohol for 6 hours before or 6 hours after taking Trokendi XR.  If you notice blurred vision, eye pain, or other eye problems, seek medical attention at once for an eye exam.  The use of this medicine may increase the chance of suicidal thoughts or actions. Pay special attention to how you are responding while on this medicine. Any worsening of mood, or thoughts of suicide or dying should be reported to your health care professional right away.  This medicine may increase the chance of developing metabolic acidosis. If left untreated, this can cause kidney stones, bone disease, or slowed growth in children. Symptoms include breathing fast, fatigue, loss of appetite, irregular heartbeat, or loss of consciousness. Call your doctor immediately if you experience any of these side effects. Also, tell your doctor about  any surgery you plan on having while taking this medicine since this may increase your risk for metabolic acidosis.  Birth control pills may not work properly while you are taking this medicine. Talk to your doctor about using an extra method of birth control.  Women who become pregnant while using this medicine may enroll in the [place] Antiepileptic Drug Pregnancy Registry by calling 1-843.609.9380. This registry collects information about the safety of antiepileptic drug use during pregnancy.  NOTE:This sheet is a summary. It may not cover all possible information. If you have questions about this medicine, talk to your doctor, pharmacist, or health care provider. Copyright© 2017 [employer]

## 2018-08-27 ENCOUNTER — OFFICE VISIT (OUTPATIENT)
Dept: OBSTETRICS AND GYNECOLOGY | Facility: CLINIC | Age: 58
End: 2018-08-27
Payer: COMMERCIAL

## 2018-08-27 VITALS
HEIGHT: 63 IN | DIASTOLIC BLOOD PRESSURE: 82 MMHG | BODY MASS INDEX: 32.38 KG/M2 | SYSTOLIC BLOOD PRESSURE: 138 MMHG | WEIGHT: 182.75 LBS

## 2018-08-27 DIAGNOSIS — Z01.419 WELL WOMAN EXAM WITH ROUTINE GYNECOLOGICAL EXAM: Primary | ICD-10-CM

## 2018-08-27 DIAGNOSIS — Z78.0 POSTMENOPAUSE: ICD-10-CM

## 2018-08-27 PROCEDURE — 3079F DIAST BP 80-89 MM HG: CPT | Mod: CPTII,S$GLB,, | Performed by: NURSE PRACTITIONER

## 2018-08-27 PROCEDURE — 3075F SYST BP GE 130 - 139MM HG: CPT | Mod: CPTII,S$GLB,, | Performed by: NURSE PRACTITIONER

## 2018-08-27 PROCEDURE — 88175 CYTOPATH C/V AUTO FLUID REDO: CPT

## 2018-08-27 PROCEDURE — 99999 PR PBB SHADOW E&M-EST. PATIENT-LVL III: CPT | Mod: PBBFAC,,, | Performed by: NURSE PRACTITIONER

## 2018-08-27 PROCEDURE — 99396 PREV VISIT EST AGE 40-64: CPT | Mod: S$GLB,,, | Performed by: NURSE PRACTITIONER

## 2018-08-27 NOTE — PROGRESS NOTES
HISTORY OF PRESENT ILLNESS:    Irene Lawton is a 58 y.o. female , presents for a routine exam and has no gyn complaints.      Past Medical History:   Diagnosis Date    ALLERGIC RHINITIS     Anemia     Anxiety     Cancer     right neck    Hypertension     Transient elevated blood pressure        Past Surgical History:   Procedure Laterality Date    Surgical removal neck cancer Right     TUBAL LIGATION          MEDICATIONS AND ALLERGIES:      Current Outpatient Medications:     fluticasone (FLONASE) 50 mcg/actuation nasal spray, 1 spray by Each Nare route once daily., Disp: 1 Bottle, Rfl: 3    GUAIFENESIN/PSEUDOEPHEDRNE HCL (MUCINEX D ORAL), Take by mouth., Disp: , Rfl:     hydroCHLOROthiazide (HYDRODIURIL) 25 MG tablet, Take 1 tablet (25 mg total) by mouth once daily., Disp: 30 tablet, Rfl: 6    meloxicam (MOBIC) 15 MG tablet, Take 1 tablet (15 mg total) by mouth once daily. (Patient taking differently: Take 15 mg by mouth as needed. ), Disp: 30 tablet, Rfl: 3    MULTIVITAMIN ORAL, Take by mouth., Disp: , Rfl:     polyethylene glycol (GLYCOLAX) 17 gram/dose powder, Take 17 g by mouth once daily., Disp: 238 g, Rfl: 3    scopolamine (TRANSDERM-SCOP) 1.3-1.5 mg (1 mg over 3 days), Place 1 patch onto the skin every 72 hours., Disp: 3 patch, Rfl: 0    topiramate (TROKENDI XR) 25 mg Cp24, Take 25 mg by mouth every evening., Disp: 30 capsule, Rfl: 1    Review of patient's allergies indicates:   Allergen Reactions    No known allergies        Family History   Problem Relation Age of Onset    Hypertension Mother     Breast cancer Mother     Colon cancer Neg Hx     Ovarian cancer Neg Hx        Social History     Socioeconomic History    Marital status: Single     Spouse name: Not on file    Number of children: 2    Years of education: 14 years    Highest education level: Not on file   Social Needs    Financial resource strain: Not on file    Food insecurity - worry: Not on file    Food  "insecurity - inability: Not on file    Transportation needs - medical: Not on file    Transportation needs - non-medical: Not on file   Occupational History    Occupation: [employer] manager     Employer: [id number]   Tobacco Use    Smoking status: Never Smoker    Smokeless tobacco: Never Used   Substance and Sexual Activity    Alcohol use: Yes     Comment: Social    Drug use: No    Sexual activity: Yes     Partners: Male     Birth control/protection: Surgical   Other Topics Concern    Not on file   Social History Narrative    Works at [employer] and does not exercise regularly       OB HISTORY: Number of vaginal deliveries:2     COMPREHENSIVE GYN HISTORY:  PAP History:  Denies abnormal Paps. LAST PAP 5-28-15 NORMAL.  Infection History: Denies STDs. Denies PID.  Benign History: Denies uterine fibroids. Denies ovarian cysts. Denies endometriosis.  Denies other conditions.  Cancer History: Denies cervical cancer. Denies uterine cancer or hyperplasia. Denies ovarian cancer. Denies vulvar cancer or pre-cancer. Denies vaginal cancer or pre-cancer. Denies breast cancer. Denies colon cancer.  Sexual Activity History: Reports currently being sexually active  Menstrual History: Denies menses. Pt is  2012 not on HRT.        ROS:  GENERAL: + WT GAIN. No swelling. No fatigue. No fever.  CARDIOVASCULAR: No chest pain. No shortness of breath. No leg cramps.   NEUROLOGICAL: No headaches. No vision changes.  BREASTS: No pain. No lumps. No discharge.  ABDOMEN: No pain. No nausea. No vomiting. No diarrhea. No constipation.  REPRODUCTIVE: No abnormal bleeding.   VULVA: No pain. No lesions. No itching.  VAGINA: No relaxation. No itching. No odor. No discharge. No lesions.  URINARY: No incontinence. No nocturia. No frequency. No dysuria.    /82 (BP Location: Right arm, Patient Position: Sitting)   Ht 5' 3" (1.6 m)   Wt 82.9 kg (182 lb 12.2 oz)   LMP 06/27/2012   BMI 32.37 kg/m²   9-8-17  lb 11.1 " oz    PE:  APPEARANCE: Well nourished, well developed, in no acute distress.  AFFECT: WNL, alert and oriented x 3.  SKIN: No hirsutism or acne.  NECK: Neck symmetric without masses or thyromegaly.  NODES: No inguinal, cervical, axillary or femoral lymph node enlargement.  CHEST: Good respiratory effort.   ABDOMEN: Soft. No tenderness or masses.  BREASTS: Symmetrical, no skin changes or visible lesions. No palpable masses, nipple discharge bilaterally.  PELVIC: ATROPHIC EXTERNAL FEMALE GENITALIA without lesions. Normal hair distribution. Adequate perineal body, normal urethral meatus. VAGINA DRY/ ATROPHIC without lesions or discharge. CERVIX STENOTIC without lesions, discharge or tenderness. No significant cystocele or rectocele. Bimanual exam shows uterus to be normal size, regular, mobile and nontender. Adnexa without masses or tenderness.  RECTAL: Rectovaginal exam confirms above with normal sphincter tone, no masses.  EXTREMITIES: No edema.    DIAGNOSIS:  1. Well woman exam with routine gynecological exam    2. Postmenopause        PLAN:    Orders Placed This Encounter    Liquid-based pap smear, screening   Up to date on mammogram and colonoscopy    COUNSELING:  The patient was counseled today on:  -osteoporosis prevention, calcium supplementation, regular weight bearing exercise;  -A.C.S. Pap and pelvic exam guidelines (pap every 3 years, no pap after age 65) and recommendations for yearly mammogram;  -to see her PCP for other health maintenance.    FOLLOW-UP with me annually.

## 2018-11-02 ENCOUNTER — PATIENT MESSAGE (OUTPATIENT)
Dept: PAIN MEDICINE | Facility: CLINIC | Age: 58
End: 2018-11-02

## 2018-11-07 ENCOUNTER — LAB VISIT (OUTPATIENT)
Dept: LAB | Facility: OTHER | Age: 58
End: 2018-11-07
Payer: COMMERCIAL

## 2018-11-07 DIAGNOSIS — R73.03 PREDIABETES: ICD-10-CM

## 2018-11-07 LAB
ANION GAP SERPL CALC-SCNC: 9 MMOL/L
BUN SERPL-MCNC: 16 MG/DL
CALCIUM SERPL-MCNC: 10.3 MG/DL
CHLORIDE SERPL-SCNC: 103 MMOL/L
CO2 SERPL-SCNC: 28 MMOL/L
CREAT SERPL-MCNC: 0.8 MG/DL
EST. GFR  (AFRICAN AMERICAN): >60 ML/MIN/1.73 M^2
EST. GFR  (NON AFRICAN AMERICAN): >60 ML/MIN/1.73 M^2
ESTIMATED AVG GLUCOSE: 126 MG/DL
GLUCOSE SERPL-MCNC: 97 MG/DL
HBA1C MFR BLD HPLC: 6 %
POTASSIUM SERPL-SCNC: 3.8 MMOL/L
SODIUM SERPL-SCNC: 140 MMOL/L
TSH SERPL DL<=0.005 MIU/L-ACNC: 3.37 UIU/ML

## 2018-11-07 PROCEDURE — 80048 BASIC METABOLIC PNL TOTAL CA: CPT

## 2018-11-07 PROCEDURE — 83036 HEMOGLOBIN GLYCOSYLATED A1C: CPT

## 2018-11-07 PROCEDURE — 36415 COLL VENOUS BLD VENIPUNCTURE: CPT

## 2018-11-07 PROCEDURE — 84443 ASSAY THYROID STIM HORMONE: CPT

## 2018-12-18 NOTE — TELEPHONE ENCOUNTER
----- Message from Carrie Rossi sent at 7/18/2018 10:30 AM CDT -----  Contact: Home: [phone]   Caller is requesting to schedule their annual screening mammogram appointment. Order is not listed in Epic.  Please enter order and contact patient to schedule.    Where would they like the mammogram performed?:   [facility]  Additional information:       Wheelchair/Stroller

## 2019-01-09 ENCOUNTER — PATIENT MESSAGE (OUTPATIENT)
Dept: PAIN MEDICINE | Facility: CLINIC | Age: 59
End: 2019-01-09

## 2019-03-14 ENCOUNTER — TELEPHONE (OUTPATIENT)
Dept: SURGERY | Facility: CLINIC | Age: 59
End: 2019-03-14

## 2019-03-14 NOTE — TELEPHONE ENCOUNTER
Returned call. No answer. Left message that next colonoscopy is due in 2023 according to new surveillance protocol.  Instructed her to call back if she had any questions.

## 2019-03-14 NOTE — TELEPHONE ENCOUNTER
----- Message from Selma Faria sent at 3/14/2019 11:19 AM CDT -----  Contact: self ex [phone] or [phone]  Patient Requesting Schedule Appointment.     Reason for schedule appt.: Colonoscopy   When is the first available appointment? Unable to access   Communication Preference: self ex [phone] or [phone]  Additional Information:

## 2019-03-20 ENCOUNTER — PATIENT MESSAGE (OUTPATIENT)
Dept: INTERNAL MEDICINE | Facility: CLINIC | Age: 59
End: 2019-03-20

## 2019-03-20 ENCOUNTER — TELEPHONE (OUTPATIENT)
Dept: INTERNAL MEDICINE | Facility: CLINIC | Age: 59
End: 2019-03-20

## 2019-03-20 NOTE — TELEPHONE ENCOUNTER
"----- Message from Kenzie Toledo sent at 3/20/2019  4:29 PM CDT -----  Contact: pt [phone]  Patient would like to get medical advice.  Symptoms (please be specific):  Congestion/ear ache  How long has patient had these symptoms:  4  Pharmacy name and phone # (DON'T enter "on file" or "in chart"):    Any drug allergies:  None   Would the patient rather a call back or a response via [facility]?:  Pt would like something called into pharmacy   [id number] - [place]), [place] - [address] [phone] (Phone)  [phone] (Fax)  Comments:    "

## 2019-04-15 ENCOUNTER — OFFICE VISIT (OUTPATIENT)
Dept: INTERNAL MEDICINE | Facility: CLINIC | Age: 59
End: 2019-04-15
Attending: FAMILY MEDICINE
Payer: COMMERCIAL

## 2019-04-15 VITALS
HEART RATE: 73 BPM | WEIGHT: 183.44 LBS | DIASTOLIC BLOOD PRESSURE: 84 MMHG | SYSTOLIC BLOOD PRESSURE: 116 MMHG | HEIGHT: 62 IN | BODY MASS INDEX: 33.76 KG/M2 | OXYGEN SATURATION: 99 %

## 2019-04-15 DIAGNOSIS — E66.9 OBESITY, UNSPECIFIED CLASSIFICATION, UNSPECIFIED OBESITY TYPE, UNSPECIFIED WHETHER SERIOUS COMORBIDITY PRESENT: ICD-10-CM

## 2019-04-15 DIAGNOSIS — E01.0 THYROMEGALY: ICD-10-CM

## 2019-04-15 DIAGNOSIS — J02.9 SORE THROAT: Primary | ICD-10-CM

## 2019-04-15 DIAGNOSIS — R52 BODY ACHES: ICD-10-CM

## 2019-04-15 LAB
CTP QC/QA: YES
S PYO RRNA THROAT QL PROBE: NEGATIVE

## 2019-04-15 PROCEDURE — 3079F DIAST BP 80-89 MM HG: CPT | Mod: CPTII,S$GLB,, | Performed by: FAMILY MEDICINE

## 2019-04-15 PROCEDURE — 99214 PR OFFICE/OUTPT VISIT, EST, LEVL IV, 30-39 MIN: ICD-10-PCS | Mod: S$GLB,,, | Performed by: FAMILY MEDICINE

## 2019-04-15 PROCEDURE — 3079F PR MOST RECENT DIASTOLIC BLOOD PRESSURE 80-89 MM HG: ICD-10-PCS | Mod: CPTII,S$GLB,, | Performed by: FAMILY MEDICINE

## 2019-04-15 PROCEDURE — 99999 PR PBB SHADOW E&M-EST. PATIENT-LVL III: ICD-10-PCS | Mod: PBBFAC,,, | Performed by: FAMILY MEDICINE

## 2019-04-15 PROCEDURE — 3074F SYST BP LT 130 MM HG: CPT | Mod: CPTII,S$GLB,, | Performed by: FAMILY MEDICINE

## 2019-04-15 PROCEDURE — 3074F PR MOST RECENT SYSTOLIC BLOOD PRESSURE < 130 MM HG: ICD-10-PCS | Mod: CPTII,S$GLB,, | Performed by: FAMILY MEDICINE

## 2019-04-15 PROCEDURE — 87081 CULTURE SCREEN ONLY: CPT

## 2019-04-15 PROCEDURE — 87880 POCT RAPID STREP A: ICD-10-PCS | Mod: QW,S$GLB,, | Performed by: FAMILY MEDICINE

## 2019-04-15 PROCEDURE — 87880 STREP A ASSAY W/OPTIC: CPT | Mod: QW,S$GLB,, | Performed by: FAMILY MEDICINE

## 2019-04-15 PROCEDURE — 99999 PR PBB SHADOW E&M-EST. PATIENT-LVL III: CPT | Mod: PBBFAC,,, | Performed by: FAMILY MEDICINE

## 2019-04-15 PROCEDURE — 3008F PR BODY MASS INDEX (BMI) DOCUMENTED: ICD-10-PCS | Mod: CPTII,S$GLB,, | Performed by: FAMILY MEDICINE

## 2019-04-15 PROCEDURE — 99214 OFFICE O/P EST MOD 30 MIN: CPT | Mod: S$GLB,,, | Performed by: FAMILY MEDICINE

## 2019-04-15 PROCEDURE — 3008F BODY MASS INDEX DOCD: CPT | Mod: CPTII,S$GLB,, | Performed by: FAMILY MEDICINE

## 2019-04-15 RX ORDER — LIDOCAINE HYDROCHLORIDE 20 MG/ML
SOLUTION OROPHARYNGEAL
Qty: 100 ML | Refills: 1 | Status: SHIPPED | OUTPATIENT
Start: 2019-04-15 | End: 2019-08-08

## 2019-04-15 RX ORDER — MONTELUKAST SODIUM 10 MG/1
10 TABLET ORAL NIGHTLY
Qty: 30 TABLET | Refills: 0 | Status: SHIPPED | OUTPATIENT
Start: 2019-04-15 | End: 2019-05-15

## 2019-04-15 NOTE — PROGRESS NOTES
"Subjective:      Patient ID: Irene Lawton is a 58 y.o. female.    Chief Complaint: Sore Throat; Generalized Body Aches; and Nasal Congestion    HPI   Patient here today for itchy throat that started this morning. Then soreness developed. She has been able to eat a sandwich and drink tea and no pain with swallowing. She has noticed in the last hour body aches all over. She reports some sinus congestion. She denies fevers, coughing, wheezing, ear pain, ear drainage, abdominal pain, nausea, vomiting, diarrhea, blood in her stool or black stool. Her daughter was sick 3 weeks ago. She has not had to take any medication today. She did try vitamin C this morning. She has flonase at home and allegra.     Review of Systems   Constitutional: Negative for activity change, appetite change, chills, diaphoresis, fatigue, fever and unexpected weight change.   HENT: Positive for sore throat. Negative for congestion, ear discharge, ear pain, hearing loss, postnasal drip, rhinorrhea and sinus pressure.    Respiratory: Negative for cough, shortness of breath and wheezing.    Cardiovascular: Negative for chest pain.   Gastrointestinal: Negative for abdominal pain, constipation, diarrhea, nausea and vomiting.   Genitourinary: Negative for dysuria and frequency.   Musculoskeletal: Positive for arthralgias.   Neurological: Negative for dizziness and light-headedness.   Psychiatric/Behavioral: Negative for suicidal ideas.     I personally reviewed Past Medical History, Past Surgical history,  Past Social History and Family History      Objective:   /84 (BP Location: Left arm, Patient Position: Sitting)   Pulse 73   Ht 5' 2" (1.575 m)   Wt 83.2 kg (183 lb 6.8 oz)   LMP 06/27/2012   SpO2 99%   BMI 33.55 kg/m²     Physical Exam   Constitutional: She is oriented to person, place, and time. She appears well-developed and well-nourished. No distress.   HENT:   Head: Normocephalic and atraumatic.   Right Ear: Hearing, tympanic " membrane, external ear and ear canal normal.   Left Ear: Hearing, tympanic membrane, external ear and ear canal normal.   Nose: Nose normal.   Mouth/Throat: Uvula is midline. Posterior oropharyngeal erythema present. No oropharyngeal exudate.   Eyes: Pupils are equal, round, and reactive to light. Conjunctivae and EOM are normal. Right eye exhibits no discharge. Left eye exhibits no discharge. No scleral icterus.   Neck: Normal range of motion. Neck supple. Thyromegaly present.   Cardiovascular: Normal rate, regular rhythm, normal heart sounds and intact distal pulses. Exam reveals no gallop.   No murmur heard.  Pulmonary/Chest: Effort normal and breath sounds normal. No respiratory distress. She has no wheezes. She has no rales. She exhibits no tenderness.   Abdominal: Soft. Bowel sounds are normal. She exhibits no distension and no mass. There is no tenderness. There is no rebound and no guarding.   Neurological: She is alert and oriented to person, place, and time.   Skin: Skin is warm.   Vitals reviewed.      1. Sore throat    2. Body aches    3. Obesity, unspecified classification, unspecified obesity type, unspecified whether serious comorbidity present    4. Thyromegaly        1. Supportive care, follow up with throat culture,   2. Negative for flu, tylenol as needed  3. Did not stat topamax and went on vacation and then did not restart food journal   q4-6 weeks weight checks  4. US    Orders Placed This Encounter   Procedures    Strep A culture, throat    US Soft Tissue Head Neck Thyroid    POCT Rapid Strep A    POCT Influenza A/B Molecular     Medications Ordered This Encounter   Medications    lidocaine HCl 2% (LIDOCAINE VISCOUS) 2 % Soln     Sig: by Mucous Membrane route every 3 (three) hours. 5-10 ml gargle spit     Dispense:  100 mL     Refill:  1    montelukast (SINGULAIR) 10 mg tablet     Sig: Take 1 tablet (10 mg total) by mouth every evening.     Dispense:  30 tablet     Refill:  0

## 2019-04-17 LAB — BACTERIA THROAT CULT: NORMAL

## 2019-05-01 ENCOUNTER — PATIENT MESSAGE (OUTPATIENT)
Dept: INTERNAL MEDICINE | Facility: CLINIC | Age: 59
End: 2019-05-01

## 2019-05-01 ENCOUNTER — HOSPITAL ENCOUNTER (OUTPATIENT)
Dept: RADIOLOGY | Facility: OTHER | Age: 59
Discharge: HOME | End: 2019-05-01
Attending: FAMILY MEDICINE
Payer: COMMERCIAL

## 2019-05-01 DIAGNOSIS — E01.0 THYROMEGALY: ICD-10-CM

## 2019-05-01 PROCEDURE — 76536 US SOFT TISSUE HEAD NECK THYROID: ICD-10-PCS | Mod: 26,,, | Performed by: RADIOLOGY

## 2019-05-01 PROCEDURE — 76536 US EXAM OF HEAD AND NECK: CPT | Mod: 26,,, | Performed by: RADIOLOGY

## 2019-05-01 PROCEDURE — 76536 US EXAM OF HEAD AND NECK: CPT | Mod: TC

## 2019-05-02 ENCOUNTER — PATIENT MESSAGE (OUTPATIENT)
Dept: INTERNAL MEDICINE | Facility: CLINIC | Age: 59
End: 2019-05-02

## 2019-05-13 ENCOUNTER — CLINICAL SUPPORT (OUTPATIENT)
Dept: INTERNAL MEDICINE | Facility: CLINIC | Age: 59
End: 2019-05-13
Payer: COMMERCIAL

## 2019-05-13 VITALS — WEIGHT: 182.31 LBS | BODY MASS INDEX: 33.35 KG/M2

## 2019-06-13 ENCOUNTER — PATIENT MESSAGE (OUTPATIENT)
Dept: INTERNAL MEDICINE | Facility: CLINIC | Age: 59
End: 2019-06-13

## 2019-06-24 RX ORDER — HYDROCHLOROTHIAZIDE 25 MG/1
TABLET ORAL
Qty: 30 TABLET | Refills: 6 | Status: SHIPPED | OUTPATIENT
Start: 2019-06-24 | End: 2020-07-01

## 2019-08-08 ENCOUNTER — OFFICE VISIT (OUTPATIENT)
Dept: INTERNAL MEDICINE | Facility: CLINIC | Age: 59
End: 2019-08-08
Payer: COMMERCIAL

## 2019-08-08 VITALS
SYSTOLIC BLOOD PRESSURE: 108 MMHG | DIASTOLIC BLOOD PRESSURE: 66 MMHG | TEMPERATURE: 99 F | HEART RATE: 76 BPM | RESPIRATION RATE: 16 BRPM | WEIGHT: 185.44 LBS | BODY MASS INDEX: 31.66 KG/M2 | HEIGHT: 64 IN

## 2019-08-08 DIAGNOSIS — Z00.00 ROUTINE MEDICAL EXAM: Primary | ICD-10-CM

## 2019-08-08 DIAGNOSIS — O22.00 VARICOSE VEINS DURING PREGNANCY, ANTEPARTUM: Primary | ICD-10-CM

## 2019-08-08 DIAGNOSIS — Z12.31 VISIT FOR SCREENING MAMMOGRAM: ICD-10-CM

## 2019-08-08 PROCEDURE — 3078F PR MOST RECENT DIASTOLIC BLOOD PRESSURE < 80 MM HG: ICD-10-PCS | Mod: CPTII,S$GLB,, | Performed by: INTERNAL MEDICINE

## 2019-08-08 PROCEDURE — 3074F SYST BP LT 130 MM HG: CPT | Mod: CPTII,S$GLB,, | Performed by: INTERNAL MEDICINE

## 2019-08-08 PROCEDURE — 3078F DIAST BP <80 MM HG: CPT | Mod: CPTII,S$GLB,, | Performed by: INTERNAL MEDICINE

## 2019-08-08 PROCEDURE — 99999 PR PBB SHADOW E&M-EST. PATIENT-LVL IV: CPT | Mod: PBBFAC,,, | Performed by: INTERNAL MEDICINE

## 2019-08-08 PROCEDURE — 99999 PR PBB SHADOW E&M-EST. PATIENT-LVL IV: ICD-10-PCS | Mod: PBBFAC,,, | Performed by: INTERNAL MEDICINE

## 2019-08-08 PROCEDURE — 99396 PR PREVENTIVE VISIT,EST,40-64: ICD-10-PCS | Mod: S$GLB,,, | Performed by: INTERNAL MEDICINE

## 2019-08-08 PROCEDURE — 3074F PR MOST RECENT SYSTOLIC BLOOD PRESSURE < 130 MM HG: ICD-10-PCS | Mod: CPTII,S$GLB,, | Performed by: INTERNAL MEDICINE

## 2019-08-08 PROCEDURE — 99396 PREV VISIT EST AGE 40-64: CPT | Mod: S$GLB,,, | Performed by: INTERNAL MEDICINE

## 2019-08-08 RX ORDER — IBUPROFEN AND FAMOTIDINE 26.6; 8 MG/1; MG/1
1 TABLET ORAL 3 TIMES DAILY PRN
Qty: 90 TABLET | Refills: 3 | Status: SHIPPED | OUTPATIENT
Start: 2019-08-08 | End: 2019-08-09 | Stop reason: SDUPTHER

## 2019-08-08 NOTE — PATIENT INSTRUCTIONS
Tips to Control Acid Reflux    To control acid reflux, youll need to make some basic diet and lifestyle changes. The simple steps outlined below may be all youll need to ease discomfort.  Watch what you eat  · Avoid fatty foods and spicy foods.  · Eat fewer acidic foods, such as citrus and tomato-based foods. These can increase symptoms.  · Limit drinking alcohol, caffeine, and fizzy beverages. All increase acid reflux.  · Try limiting chocolate, peppermint, and spearmint. These can worsen acid reflux in some people.  Watch when you eat  · Avoid lying down for 3 hours after eating.  · Do not snack before going to bed.  Raise your head  Raising your head and upper body by 4 to 6 inches helps limit reflux when youre lying down. Put blocks under the head of your bed frame to raise it.  Other changes  · Lose weight, if you need to  · Dont exercise near bedtime  · Avoid tight-fitting clothes  · Limit aspirin and ibuprofen  · Stop smoking   Date Last Reviewed: 7/1/2016  © 2860-8144 [employer]. [address] [place] [address], [place] [address]. All rights reserved. This information is not intended as a substitute for professional medical care. Always follow your healthcare professional's instructions.

## 2019-08-08 NOTE — PROGRESS NOTES
The patient is a 59 y.o. old female who presents to the office for a physical.    PAST MEDICAL HISTORY  Past Medical History:   Diagnosis Date    ALLERGIC RHINITIS     Anemia     Anxiety     Cancer     right neck    Hypertension     Transient elevated blood pressure        SURGICAL HISTORY:  Past Surgical History:   Procedure Laterality Date    COLONOSCOPY N/A 6/28/2013    Performed by Geoff Wallace MD at [facility] (4TH FLR)    Surgical removal neck cancer Right     TUBAL LIGATION           MEDS:  Medcard reviewed and updated    ALLERGIES: Allergy Card reviewed and updated    SOCIAL HISTORY:   The patient is a nonsmoker, denies alcohol or illicit drug use.    ROS:  GENERAL: No fever, chills, fatigability or weight loss.  SKIN: No rashes.  Positive varicose veins.  HEAD: No headaches or recent head trauma.  EYES: No photophobia, ocular pain or diplopia.  EARS: Denies ear pain, discharge or vertigo.  NOSE: No epistaxis or postnasal drip.  MOUTH & THROAT: No hoarseness or change in voice.   NODES: Denies swollen glands.  CHEST: Denies shortness of breath, wheezing, cough and sputum production.  CARDIOVASCULAR: Denies chest pain or palpitations.  ABDOMEN: Appetite fine. Denies diarrhea, abdominal pain, constipation or blood in stool.  Positive reflux.  URINARY: No dysuria or hematuria.  MUSCULOSKELETAL: No joint stiffness or swelling. Denies back pain.  NEUROLOGIC: No history of seizures.  ENDOCRINE: Denies polyuria or polydipsia.  PSYCHIATRIC: Denies mood swings, depression, anxiety, homicidal or suicidal thoughts.    SCREENINGS:  Last cholesterol: 2018  Last colonoscopy: 2013  Last mammogram: August 9, 2018  Last Pap smear: August 27, 2018  Last tetanus: 2016  Last Pneumovax: none  Last eye exam: about 2 years ago  Last bone density: none  Last menstrual period: postmenopausal    PE:   Vitals:  Vitals:    08/08/19 1319   BP: 108/66   Pulse: 76   Resp: 16   Temp: 99.3 °F (37.4 °C)       APPEARANCE: Well  nourished, well developed, in no acute distress.    EYES: Sclerae anicteric. PERRL. EOMI.      EARS: TM's intact. No retraction or perforation.    NOSE: Mucosa pink. Airway clear.  MOUTH & THROAT: No tonsillar enlargement. No pharyngeal erythema or exudate. No stridor.  NECK: Supple, no thyromegaly.  CHEST: Lungs clear to auscultation with unlabored respirations.  CARDIOVASCULAR: Normal S1, S2. No murmurs. No carotid bruits. No pedal edema.  ABDOMEN: Bowel sounds normal. Not distended. Soft. No tenderness or masses.   MUSCULOSKELETAL:  Normal gait, no cyanosis or clubbing.   SKIN: Normal skin turgor, warm and dry.  NEUROLOGIC: Cranial Nerves: Intact.  PSYCHIATRIC: The patient is oriented to person, place, and time and has a pleasant affect.        ASSESSMENT/PLAN:  Irene was seen today for annual exam and varicose veins.    Diagnoses and all orders for this visit:    Routine medical exam  -     CBC auto differential; Future  -     Comprehensive metabolic panel; Future  -     Hemoglobin A1c; Future  -     Lipid panel; Future  -     TSH; Future  -     Urinalysis; Future  -     Vitamin D; Future    Visit for screening mammogram  -     Mammo Digital Screening Bilat; Future    Other orders  -     ibuprofen-famotidine (DUEXIS) 800-26.6 mg Tab; Take 1 tablet by mouth 3 (three) times daily as needed.            Answers for HPI/ROS submitted by the patient on 8/6/2019   activity change: No  unexpected weight change: No  neck pain: No  hearing loss: No  rhinorrhea: No  trouble swallowing: No  eye discharge: No  visual disturbance: No  chest tightness: No  wheezing: No  chest pain: No  palpitations: No  blood in stool: No  constipation: No  vomiting: No  diarrhea: No  polydipsia: No  polyuria: No  difficulty urinating: No  hematuria: No  menstrual problem: No  dysuria: No  joint swelling: No  arthralgias: No  headaches: No  weakness: No  confusion: No  dysphoric mood: No

## 2019-08-09 ENCOUNTER — PATIENT MESSAGE (OUTPATIENT)
Dept: INTERNAL MEDICINE | Facility: CLINIC | Age: 59
End: 2019-08-09

## 2019-08-09 ENCOUNTER — LAB VISIT (OUTPATIENT)
Dept: LAB | Facility: OTHER | Age: 59
End: 2019-08-09
Attending: INTERNAL MEDICINE
Payer: COMMERCIAL

## 2019-08-09 DIAGNOSIS — Z00.00 ROUTINE MEDICAL EXAM: ICD-10-CM

## 2019-08-09 LAB
25(OH)D3+25(OH)D2 SERPL-MCNC: 37 NG/ML (ref 30–96)
ALBUMIN SERPL BCP-MCNC: 4 G/DL (ref 3.5–5.2)
ALP SERPL-CCNC: 142 U/L (ref 55–135)
ALT SERPL W/O P-5'-P-CCNC: 99 U/L (ref 10–44)
ANION GAP SERPL CALC-SCNC: 9 MMOL/L (ref 8–16)
AST SERPL-CCNC: 76 U/L (ref 10–40)
BACTERIA #/AREA URNS HPF: ABNORMAL /HPF
BASOPHILS # BLD AUTO: 0.01 K/UL (ref 0–0.2)
BASOPHILS NFR BLD: 0.1 % (ref 0–1.9)
BILIRUB SERPL-MCNC: 0.3 MG/DL (ref 0.1–1)
BILIRUB UR QL STRIP: NEGATIVE
BUN SERPL-MCNC: 13 MG/DL (ref 6–20)
CALCIUM SERPL-MCNC: 10.6 MG/DL (ref 8.7–10.5)
CHLORIDE SERPL-SCNC: 104 MMOL/L (ref 95–110)
CHOLEST SERPL-MCNC: 249 MG/DL (ref 120–199)
CHOLEST/HDLC SERPL: 3.3 {RATIO} (ref 2–5)
CLARITY UR: CLEAR
CO2 SERPL-SCNC: 27 MMOL/L (ref 23–29)
COLOR UR: YELLOW
CREAT SERPL-MCNC: 0.8 MG/DL (ref 0.5–1.4)
DIFFERENTIAL METHOD: ABNORMAL
EOSINOPHIL # BLD AUTO: 0.1 K/UL (ref 0–0.5)
EOSINOPHIL NFR BLD: 0.9 % (ref 0–8)
ERYTHROCYTE [DISTWIDTH] IN BLOOD BY AUTOMATED COUNT: 14.3 % (ref 11.5–14.5)
EST. GFR  (AFRICAN AMERICAN): >60 ML/MIN/1.73 M^2
EST. GFR  (NON AFRICAN AMERICAN): >60 ML/MIN/1.73 M^2
ESTIMATED AVG GLUCOSE: 126 MG/DL (ref 68–131)
GLUCOSE SERPL-MCNC: 85 MG/DL (ref 70–110)
GLUCOSE UR QL STRIP: NEGATIVE
HBA1C MFR BLD HPLC: 6 % (ref 4–5.6)
HCT VFR BLD AUTO: 39.1 % (ref 37–48.5)
HDLC SERPL-MCNC: 75 MG/DL (ref 40–75)
HDLC SERPL: 30.1 % (ref 20–50)
HGB BLD-MCNC: 12.2 G/DL (ref 12–16)
HGB UR QL STRIP: ABNORMAL
IMM GRANULOCYTES # BLD AUTO: 0.02 K/UL (ref 0–0.04)
IMM GRANULOCYTES NFR BLD AUTO: 0.3 % (ref 0–0.5)
KETONES UR QL STRIP: NEGATIVE
LDLC SERPL CALC-MCNC: 154.6 MG/DL (ref 63–159)
LEUKOCYTE ESTERASE UR QL STRIP: NEGATIVE
LYMPHOCYTES # BLD AUTO: 3.5 K/UL (ref 1–4.8)
LYMPHOCYTES NFR BLD: 47.1 % (ref 18–48)
MCH RBC QN AUTO: 27.5 PG (ref 27–31)
MCHC RBC AUTO-ENTMCNC: 31.2 G/DL (ref 32–36)
MCV RBC AUTO: 88 FL (ref 82–98)
MICROSCOPIC COMMENT: ABNORMAL
MONOCYTES # BLD AUTO: 0.4 K/UL (ref 0.3–1)
MONOCYTES NFR BLD: 6 % (ref 4–15)
NEUTROPHILS # BLD AUTO: 3.4 K/UL (ref 1.8–7.7)
NEUTROPHILS NFR BLD: 45.6 % (ref 38–73)
NITRITE UR QL STRIP: NEGATIVE
NONHDLC SERPL-MCNC: 174 MG/DL
NRBC BLD-RTO: 0 /100 WBC
PH UR STRIP: 6 [PH] (ref 5–8)
PLATELET # BLD AUTO: 299 K/UL (ref 150–350)
PMV BLD AUTO: 9.3 FL (ref 9.2–12.9)
POTASSIUM SERPL-SCNC: 3.7 MMOL/L (ref 3.5–5.1)
PROT SERPL-MCNC: 7.8 G/DL (ref 6–8.4)
PROT UR QL STRIP: NEGATIVE
RBC # BLD AUTO: 4.43 M/UL (ref 4–5.4)
RBC #/AREA URNS HPF: 12 /HPF (ref 0–4)
SODIUM SERPL-SCNC: 140 MMOL/L (ref 136–145)
SP GR UR STRIP: 1.02 (ref 1–1.03)
SQUAMOUS #/AREA URNS HPF: 18 /HPF
TRIGL SERPL-MCNC: 97 MG/DL (ref 30–150)
TSH SERPL DL<=0.005 MIU/L-ACNC: 3.96 UIU/ML (ref 0.4–4)
URN SPEC COLLECT METH UR: ABNORMAL
UROBILINOGEN UR STRIP-ACNC: NEGATIVE EU/DL
WBC # BLD AUTO: 7.39 K/UL (ref 3.9–12.7)
WBC #/AREA URNS HPF: 1 /HPF (ref 0–5)

## 2019-08-09 PROCEDURE — 83036 HEMOGLOBIN GLYCOSYLATED A1C: CPT

## 2019-08-09 PROCEDURE — 82306 VITAMIN D 25 HYDROXY: CPT

## 2019-08-09 PROCEDURE — 81000 URINALYSIS NONAUTO W/SCOPE: CPT

## 2019-08-09 PROCEDURE — 80053 COMPREHEN METABOLIC PANEL: CPT

## 2019-08-09 PROCEDURE — 85025 COMPLETE CBC W/AUTO DIFF WBC: CPT

## 2019-08-09 PROCEDURE — 84443 ASSAY THYROID STIM HORMONE: CPT

## 2019-08-09 PROCEDURE — 80061 LIPID PANEL: CPT

## 2019-08-09 PROCEDURE — 36415 COLL VENOUS BLD VENIPUNCTURE: CPT

## 2019-08-09 RX ORDER — IBUPROFEN AND FAMOTIDINE 26.6; 8 MG/1; MG/1
1 TABLET ORAL 3 TIMES DAILY PRN
Qty: 90 TABLET | Refills: 3 | Status: SHIPPED | OUTPATIENT
Start: 2019-08-09 | End: 2020-08-28

## 2019-08-09 NOTE — TELEPHONE ENCOUNTER
----- Message from Carrie Rossi sent at 8/9/2019  3:02 PM CDT -----  Contact: [phone]  The ibuprofen-famotidine (DUEXIS) 800-26.6 mg Rx given to her yesterday couldn't be filled at [facility]. She would like you to send this to [facility]  [phone] and Fax  [phone].

## 2019-08-13 ENCOUNTER — HOSPITAL ENCOUNTER (OUTPATIENT)
Dept: RADIOLOGY | Facility: OTHER | Age: 59
Discharge: HOME | End: 2019-08-13
Attending: INTERNAL MEDICINE
Payer: COMMERCIAL

## 2019-08-13 DIAGNOSIS — I83.893 SYMPTOMATIC VARICOSE VEINS OF BOTH LOWER EXTREMITIES: Primary | ICD-10-CM

## 2019-08-13 DIAGNOSIS — Z12.31 VISIT FOR SCREENING MAMMOGRAM: ICD-10-CM

## 2019-08-13 PROCEDURE — 77067 MAMMO DIGITAL SCREENING BILAT WITH TOMOSYNTHESIS_CAD: ICD-10-PCS | Mod: 26,,, | Performed by: INTERNAL MEDICINE

## 2019-08-13 PROCEDURE — 77063 BREAST TOMOSYNTHESIS BI: CPT | Mod: 26,,, | Performed by: INTERNAL MEDICINE

## 2019-08-13 PROCEDURE — 77067 SCR MAMMO BI INCL CAD: CPT | Mod: 26,,, | Performed by: INTERNAL MEDICINE

## 2019-08-13 PROCEDURE — 77067 SCR MAMMO BI INCL CAD: CPT | Mod: TC

## 2019-08-13 PROCEDURE — 77063 MAMMO DIGITAL SCREENING BILAT WITH TOMOSYNTHESIS_CAD: ICD-10-PCS | Mod: 26,,, | Performed by: INTERNAL MEDICINE

## 2019-08-14 ENCOUNTER — HOSPITAL ENCOUNTER (OUTPATIENT)
Dept: RADIOLOGY | Facility: OTHER | Age: 59
Discharge: HOME | End: 2019-08-14
Attending: SURGERY
Payer: COMMERCIAL

## 2019-08-14 DIAGNOSIS — I83.893 SYMPTOMATIC VARICOSE VEINS OF BOTH LOWER EXTREMITIES: ICD-10-CM

## 2019-08-14 PROCEDURE — 93970 EXTREMITY STUDY: CPT | Mod: TC

## 2019-08-14 PROCEDURE — 93970 US LOWER EXTREMITY VEINS BILATERAL INSUFFICIENCY: ICD-10-PCS | Mod: 26,,, | Performed by: RADIOLOGY

## 2019-08-14 PROCEDURE — 93970 EXTREMITY STUDY: CPT | Mod: 26,,, | Performed by: RADIOLOGY

## 2019-08-15 ENCOUNTER — TELEPHONE (OUTPATIENT)
Dept: INTERNAL MEDICINE | Facility: CLINIC | Age: 59
End: 2019-08-15

## 2019-08-15 ENCOUNTER — PATIENT MESSAGE (OUTPATIENT)
Dept: INTERNAL MEDICINE | Facility: CLINIC | Age: 59
End: 2019-08-15

## 2019-08-15 DIAGNOSIS — R74.8 ABNORMAL LIVER ENZYMES: Primary | ICD-10-CM

## 2019-08-15 DIAGNOSIS — R31.29 MICROSCOPIC HEMATURIA: ICD-10-CM

## 2019-08-15 NOTE — TELEPHONE ENCOUNTER
Please inform patient labs are significant for elevated liver enzymes and microscopic hematuria.  Recommend repeating liver enzymes and checking urine culture.

## 2019-08-16 ENCOUNTER — OFFICE VISIT (OUTPATIENT)
Dept: VASCULAR SURGERY | Facility: CLINIC | Age: 59
End: 2019-08-16
Payer: COMMERCIAL

## 2019-08-16 VITALS
DIASTOLIC BLOOD PRESSURE: 75 MMHG | SYSTOLIC BLOOD PRESSURE: 164 MMHG | HEART RATE: 61 BPM | WEIGHT: 185.5 LBS | HEIGHT: 64 IN | RESPIRATION RATE: 18 BRPM | BODY MASS INDEX: 31.67 KG/M2 | TEMPERATURE: 98 F

## 2019-08-16 DIAGNOSIS — I78.1 SPIDER VEIN, SYMPTOMATIC: ICD-10-CM

## 2019-08-16 DIAGNOSIS — I87.2 VENOUS INSUFFICIENCY OF BOTH LOWER EXTREMITIES: Primary | ICD-10-CM

## 2019-08-16 PROCEDURE — 3008F PR BODY MASS INDEX (BMI) DOCUMENTED: ICD-10-PCS | Mod: CPTII,S$GLB,, | Performed by: SURGERY

## 2019-08-16 PROCEDURE — 3077F PR MOST RECENT SYSTOLIC BLOOD PRESSURE >= 140 MM HG: ICD-10-PCS | Mod: CPTII,S$GLB,, | Performed by: SURGERY

## 2019-08-16 PROCEDURE — 99204 OFFICE O/P NEW MOD 45 MIN: CPT | Mod: S$GLB,,, | Performed by: SURGERY

## 2019-08-16 PROCEDURE — 3008F BODY MASS INDEX DOCD: CPT | Mod: CPTII,S$GLB,, | Performed by: SURGERY

## 2019-08-16 PROCEDURE — 3078F PR MOST RECENT DIASTOLIC BLOOD PRESSURE < 80 MM HG: ICD-10-PCS | Mod: CPTII,S$GLB,, | Performed by: SURGERY

## 2019-08-16 PROCEDURE — 3078F DIAST BP <80 MM HG: CPT | Mod: CPTII,S$GLB,, | Performed by: SURGERY

## 2019-08-16 PROCEDURE — 99204 PR OFFICE/OUTPT VISIT, NEW, LEVL IV, 45-59 MIN: ICD-10-PCS | Mod: S$GLB,,, | Performed by: SURGERY

## 2019-08-16 PROCEDURE — 3077F SYST BP >= 140 MM HG: CPT | Mod: CPTII,S$GLB,, | Performed by: SURGERY

## 2019-08-16 NOTE — PROGRESS NOTES
Ayush Lopez MD, RPVI                                 [facility]                           [facility]                             08/16/2019    HPI:  Irene Lawton is a 59 y.o. female with   Patient Active Problem List   Diagnosis    Encounter for screening colonoscopy    being managed by PCP and specialists who is here today for evaluation of BLE varicose veins.  Patient states location is BLE occurring for a few yrs.  Associated signs and symptoms include itching, edema and discoloration.  Quality is itching and severity is 10/10 at worst.  Symptoms began a few yrs ago.  Alleviating factors include rest and ice.  Worsening factors include movement.  Patient is wearing compression stockings daily.  + FH of venous disease.  Symptoms do limit patient's functional status and daily activities.  No DVT history.  No venous interventions.  No low sodium diet.  + excessive water intake.    Migraine with aura: No  PFO/ASD/right to left shunt: no  Pregnant: no  Breastfeeding: no    no MI  no Stroke  Tobacco use: no    Past Medical History:   Diagnosis Date    ALLERGIC RHINITIS     Anemia     Anxiety     Cancer     right neck    Hypertension     Transient elevated blood pressure      Past Surgical History:   Procedure Laterality Date    COLONOSCOPY N/A 6/28/2013    Performed by Geoff Wallace MD at [facility] (4TH FLR)    Surgical removal neck cancer Right     TUBAL LIGATION       Family History   Problem Relation Age of Onset    Hypertension Mother     Breast cancer Mother     Colon cancer Neg Hx     Ovarian cancer Neg Hx      Social History     Socioeconomic History    Marital status: Single     Spouse name: Not on file    Number of children: 2    Years of education: 14 years    Highest education level: Not on file   Occupational History    Occupation: [employer] manager     Employer: [id number]   Social Needs    Financial resource strain: Somewhat hard     Food insecurity:     Worry: Sometimes true     Inability: Sometimes true    Transportation needs:     Medical: Not on file     Non-medical: Not on file   Tobacco Use    Smoking status: Never Smoker    Smokeless tobacco: Never Used   Substance and Sexual Activity    Alcohol use: Yes     Frequency: Monthly or less     Drinks per session: 1 or 2     Binge frequency: Less than monthly     Comment: Social    Drug use: No    Sexual activity: Yes     Partners: Male     Birth control/protection: Surgical   Lifestyle    Physical activity:     Days per week: 2 days     Minutes per session: Not on file    Stress: To some extent   Relationships    Social connections:     Talks on phone: Twice a week     Gets together: Once a week     Attends Jainism service: Not on file     Active member of club or organization: No     Attends meetings of clubs or organizations: Never     Relationship status: Patient refused   Other Topics Concern    Not on file   Social History Narrative    Works at [employer] and does not exercise regularly       Current Outpatient Medications:     fluticasone (FLONASE) 50 mcg/actuation nasal spray, 1 spray by Each Nare route once daily., Disp: 1 Bottle, Rfl: 3    GUAIFENESIN/PSEUDOEPHEDRNE HCL (MUCINEX D ORAL), Take by mouth., Disp: , Rfl:     hydroCHLOROthiazide (HYDRODIURIL) 25 MG tablet, TAKE 1 TABLET BY MOUTH ONCE DAILY, Disp: 30 tablet, Rfl: 6    ibuprofen-famotidine (DUEXIS) 800-26.6 mg Tab, Take 1 tablet by mouth 3 (three) times daily as needed., Disp: 90 tablet, Rfl: 3    MULTIVITAMIN ORAL, Take by mouth., Disp: , Rfl:     REVIEW OF SYSTEMS:  General: No fevers or chills; ENT: No sore throat; Allergy and Immunology: no persistent infections; Hematological and Lymphatic: No history of bleeding or easy bruising; Endocrine: negative; Respiratory: no cough, shortness of breath, or wheezing; Cardiovascular: no chest pain or dyspnea on exertion; Gastrointestinal: no abdominal pain/back,  change in bowel habits, or bloody stools; Genito-Urinary: no dysuria, trouble voiding, or hematuria; Musculoskeletal: edema; Neurological: no TIA or stroke symptoms; Psychiatric: no nervousness, anxiety or depression.    PHYSICAL EXAM:      Pulse: 61  Temp: 98 °F (36.7 °C)      General appearance:  Alert, well-appearing, and in no distress.  Oriented to person, place, and time                    Neurological: Normal speech, no focal findings noted; CN II - XII grossly intact. RLE with sensation to light touch, LLE with sensation to light touch.            Musculoskeletal: Digits/nail without cyanosis/clubbing.  Strength 5/5 BLE.                    Neck: Supple, no significant adenopathy                  Chest:  No wheezes, symmetric air entry. No use of accessory muscles               Cardiac: Normal rate and regular rhythm            Abdomen: Soft, nontender, nondistended      Extremities:   2+ R DP pulse, 2+ L DP pulse      1+ RLE edema, 1+ LLE edema    Skin:  RLE no ulcer; LLE no ulcer      RLE lateral knee spider veins, LLE lateral knee spider veins      RLE no varicose veins, LLE no varicose veins    CEAP 3/3    LAB RESULTS:  No results found for: CBC  No results found for: LABPROT, INR  Lab Results   Component Value Date     08/09/2019    K 3.7 08/09/2019     08/09/2019    CO2 27 08/09/2019    GLU 85 08/09/2019    BUN 13 08/09/2019    CREATININE 0.8 08/09/2019    CALCIUM 10.6 (H) 08/09/2019    ANIONGAP 9 08/09/2019    EGFRNONAA >60 08/09/2019     Lab Results   Component Value Date    WBC 7.39 08/09/2019    RBC 4.43 08/09/2019    HGB 12.2 08/09/2019    HCT 39.1 08/09/2019    MCV 88 08/09/2019    MCH 27.5 08/09/2019    MCHC 31.2 (L) 08/09/2019    RDW 14.3 08/09/2019     08/09/2019    MPV 9.3 08/09/2019    GRAN 3.4 08/09/2019    GRAN 45.6 08/09/2019    LYMPH 3.5 08/09/2019    LYMPH 47.1 08/09/2019    MONO 0.4 08/09/2019    MONO 6.0 08/09/2019    EOS 0.1 08/09/2019    BASO 0.01 08/09/2019     EOSINOPHIL 0.9 08/09/2019    BASOPHIL 0.1 08/09/2019    DIFFMETHOD Automated 08/09/2019     .  Lab Results   Component Value Date    HGBA1C 6.0 (H) 08/09/2019       IMAGING:  All pertinent imaging has been reviewed and interpreted independently.    Venous US 8/14/19 Impression:  No evidence of DVT.  Hemodynamically significant in the bilateral distal greater saphenous veins at the calf and distal left lesser saphenous vein.    IMP/PLAN:  59 y.o. female with   Patient Active Problem List   Diagnosis    Encounter for screening colonoscopy    being managed by PCP and specialists who is here today for evaluation of BLE edema, pain and spider veins.    -recommend compression with Rx stockings, elevation, dietary changes associated with water and sodium intake discussed at length with patient  -Exercise   -RTC 3 months for further evaluation    I spent 20 minutes evaluating this patient and greater than 50% of the time was spent counseling, coordinator care and discussing the plan of care.  All questions were answered and patient stated understanding with agreement with the above treatment plan.    Ayush Lopez MD [facility]  Vascular and Endovascular Surgery

## 2019-08-16 NOTE — PATIENT INSTRUCTIONS
Putting on Compression Stockings     Turn the stocking inside-out, then fit it over your toes and heel.          Roll the stocking up your leg.            Once stockings are on, make sure the top of the stocking is about two fingers width below the crease of the knee (or the groin if you wear thigh-high stockings).          Use equipment, such as a stocking maria isabel, or wear rubber gloves to make it easier to put on compression stockings.         Elastic compression stockings are prescribed to treat many vein problems. Wearing them may be the most important thing you do to manage your symptoms. The stockings fit tightly around your ankle, gradually reducing in pressure as they go up your legs. This helps keep blood flowing to your heart. As a result, swelling is reduced. Your healthcare provider will prescribe stockings at a safe pressure for you. He or she will also tell you how often to wear and remove the stockings. Follow these instructions closely. Also, do not buy or wear compression stockings without first seeing your healthcare provider.  Tips for wear and care  To wear stockings safely and to get the most benefit:  · Wear the length prescribed by your healthcare provider.  · Pull them to the designated height and no farther. Dont let them bunch at the top. This can restrict blood flow and increase swelling.  · Wear the stockings for the amount of time your healthcare provider recommends. Replace them when they start to feel loose. This will likely be every 3 to 6 months.  · Remove them as your healthcare provider directs. When removed, wash your legs. Then check your legs and feet for sores. Call your healthcare provider if you find a sore. Dont put the stockings back on unless your healthcare provider directs.   · Wash the stockings as instructed. They may need to be hand-washed.  Date Last Reviewed: 5/1/2016  © 7270-8629 [employer]. [address] [place] [address], [place] [address]. All rights  reserved. This information is not intended as a substitute for professional medical care. Always follow your healthcare professional's instructions.        Understanding Chronic Venous Insufficiency  Problems with the veins in the legs may lead to chronic venous insufficiency (CVI). CVI means that there is a long-term problem with the veins not being able to pump blood back to your heart. When this happens, blood stays in the legs and causes swelling and aching.   Two problems that may lead to chronic venous insufficiency are:  · Damaged valves. Valves keep blood flowing from the legs through the blood vessels and back to the heart. When the valves are damaged, blood does not flow as well.   · Deep vein thrombosis (DVT). Blood clots may form in the deep veins of the legs. This may cause pain, redness, and swelling in the legs. It may also block the flow of blood back to the heart. Seek immediate medical care if you have these symptoms.  · A blood clot in the leg can also break off and travel to the lungs. This is called pulmonary embolism (PE). In the lungs, the clot can cut off the flow of blood. This may cause chest pain, trouble breathing, sweating, a fast heartbeat, coughing (may cough up blood), and fainting. It is a medical emergency and may cause death. Call 911 if you have these symptoms.  · Healthcare providers call the two conditions, DVT and PE, venous thromboembolism (VTE).  CVI cant be cured, but you can control leg swelling to reduce the likelihood of ulcers (sores).  Recognizing the symptoms  Be aware of the following:  · If you stand or sit with your feet down for long periods, your legs may ache or feel heavy.  · Swollen ankles are possibly the most common symptom of CVI.  · As swelling increases, the skin over your ankles may show red spots or a brownish tinge. The skin may feel leathery or scaly, and may start to itch.  · If swelling is not controlled, an ulcer (open wound) may form.  What you  can do  Reduce your risk of developing ulcers by doing the following:  · Increase blood flow back to your heart by elevating your legs, exercising daily, and wearing elastic stockings.  · Boost blood flow in your legs by losing excess weight.  · If you must stand or sit in one place for a period of time, keep your blood moving by wiggling your toes, shifting your body position, and rising up on the balls of your feet.    Date Last Reviewed: 5/1/2016 © 2000-2017 [employer]. [address] [place] [address], [place] [address]. All rights reserved. This information is not intended as a substitute for professional medical care. Always follow your healthcare professional's instructions.        Tips for Using Less Salt    Most people with heart problems need to eat less salt (sodium). Reducing the amount of salt you eat may help control your blood pressure. The higher your blood pressure, the greater your risk for heart disease, stroke, blindness, and kidney problems.  At the store  · Make low-salt choices by reading labels carefully. Look for the total amount of sodium per serving.  · Use more fresh food. Buy more fruits and vegetables. Select lean meats, fish, and poultry.  · Use fewer frozen, canned, and packaged foods which often contain a lot of sodium.  · Use plain frozen vegetables without sauces or toppings. These products are often low- or no-sodium.  · Opt for reduced-sodium or no-salt-added versions of canned vegetables and soups.  In the kitchen  · Don't add salt to food when you're cooking. Season with flavorings such as onion, garlic, pepper, salt-free herbal blends, and lemon or lime juice.  · Use a cookbook containing low-salt recipes. It can give you ideas for tasty meals that are healthy for your heart.  · Sprinkle salt-free herbal blends on vegetables and meat.  · Drain and rinse canned foods, such as canned beans and vegetables, before cooking or eating.  Eating out  · Tell the  you're on a  low-salt diet. Ask questions about the menu.  · Order fish, chicken, and meat broiled, baked, poached, or grilled without salt, butter, or breading.  · Use lemon, pepper, and salt-free herb mixes to add flavor.  · Choose plain steamed rice, boiled noodles, and baked or boiled potatoes. Top potatoes with chives and a little sour cream.     Beware! Salt goes by many other names. Limit foods with these words listed as ingredients: salt, sodium, soy sauce, baking soda, baking powder, MSG, monosodium, Na (the chemical symbol for sodium). Some antacids are also high in salt.   Date Last Reviewed: 6/19/2015 © 2000-2017 [employer]. [address] [place] [address]. All rights reserved. This information is not intended as a substitute for professional medical care. Always follow your healthcare professional's instructions.        Low-Salt Diet  This diet removes foods that are high in salt. It also limits the amount of salt you use when cooking. It is most often used for people with high blood pressure, edema (fluid retention), and kidney, liver, or heart disease.  Table salt contains the mineral sodium. Your body needs sodium to work normally. But too much sodium can make your health problems worse. Your healthcare provider is recommending a low-salt (also called low-sodium) diet for you. Your total daily allowance of salt is 1,500 to 2,300 milligrams (mg). It is less than 1 teaspoon of table salt. This means you can have only about 500 to 700 mg of sodium at each meal. People with certain health problems should limit salt intake to the lower end of the recommended range.    When you cook, dont add much salt. If you can cook without using salt, even better. Dont add salt to your food at the table.  When shopping, read food labels. Salt is often called sodium on the label. Choose foods that are salt-free, low salt, or very low salt. Note that foods with reduced salt may not lower your salt intake  enough.    Beans, potatoes, and pasta  Ok: Dry beans, split peas, lentils, potatoes, rice, macaroni, pasta, spaghetti without added salt  Avoid: Potato chips, tortilla chips, and similar products  Breads and cereals  Ok: Low-sodium breads, rolls, cereals, and cakes; low-salt crackers, matzo crackers  Avoid: Salted crackers, pretzels, popcorn, Bengali toast, pancakes, muffins  Dairy  Ok: Milk, chocolate milk, hot chocolate mix, low-salt cheeses, and yogurt  Avoid: Processed cheese and cheese spreads; Roquefort, Camembert, and cottage cheese; buttermilk, instant breakfast drink  Desserts  Ok: Ice cream, frozen yogurt, juice bars, gelatin, cookies and pies, sugar, honey, jelly, hard candy  Avoid: Most pies, cakes and cookies prepared or processed with salt; instant pudding  Drinks  Ok: Tea, coffee, fizzy (carbonated) drinks, juices  Avoid: Flavored coffees, electrolyte replacement drinks, sports drinks  Meats  Ok: All fresh meat, fish, poultry, low-salt tuna, eggs, egg substitute  Avoid: Smoked, pickled, brine-cured, or salted meats and fish. This includes kim, chipped beef, corned beef, hot dogs, deli meats, ham, kosher meats, salt pork, sausage, canned tuna, salted codfish, smoked salmon, herring, sardines, or anchovies.  Seasonings and spices  Ok: Most seasonings are okay. Good substitutes for salt include: fresh herb blends, hot sauce, lemon, garlic, ayala, vinegar, dry mustard, parsley, cilantro, horseradish, tomato paste, regular margarine, mayonnaise, unsalted butter, cream cheese, vegetable oil, cream, low-salt salad dressing and gravy.  Avoid: Regular ketchup, relishes, pickles, soy sauce, teriyaki sauce, Worcestershire sauce, BBQ sauce, tartar sauce, meat tenderizer, chili sauce, regular gravy, regular salad dressing, salted butter  Soups  Ok: Low-salt soups and broths made with allowed foods  Avoid: Bouillon cubes, soups with smoked or salted meats, regular soup and broth  Vegetables  Ok: Most vegetables  are okay; also low-salt tomato and vegetable juices  Avoid: Sauerkraut and other brine-soaked vegetables; pickles and other pickled vegetables; tomato juice, olives  Date Last Reviewed: 8/1/2016 © 2000-2017 [employer]. [address] [place] [address]. All rights reserved. This information is not intended as a substitute for professional medical care. Always follow your healthcare professional's instructions.        Low-Salt Choices  Eating salt (sodium) can make your body retain too much water. Excess water makes your heart work harder. Canned, packaged, and frozen foods are easy to prepare, but they are often high in sodium. Here are some ideas for low-salt foods you can easily prepare yourself.    For breakfast  · Fruit or 100% fruit juice  · Whole-wheat bread or an English muffin. Compare sodium content on labels.  · Low-fat milk or yogurt  · Unsalted eggs  · Shredded wheat  · Corn tortillas  · Unsalted steamed rice  · Regular (not instant) hot cereal, made without salt  Stay away from:  · Sausage, kim, and ham  · Flour tortillas  · Packaged muffins, pancakes, and biscuits  · Instant hot cereals  · Cottage cheese  For lunch and dinner  · Fresh fish, chicken, turkey, or meat--baked, broiled, or roasted without salt  · Dry beans, cooked without salt  · Tofu, stir-fried without salt  · Unsalted fresh fruit and vegetables, or frozen or canned fruit and vegetables with no added salt  Stay away from:  · Lunch or deli meat that is cured or smoked  · Cheese  · Tomato juice and catsup  · Canned vegetables, soups, and fish not labeled as no-salt-added or reduced sodium  · Packaged gravies and sauces  · Olives, pickles, and relish  · Bottled salad dressings  For snacks and desserts  · Yogurt  · Unsalted, air popped popcorn  · Unsalted nuts or seeds  Stay away from:  · Pies and cakes  · Packaged dessert mixes  · Pizza  · Canned and packaged puddings  · Pretzels, chips, crackers, and nuts--unless  the label says unsalted  Date Last Reviewed: 6/17/2015  © 9251-8470 [employer]. [address] [place] [address], [place] [address]. All rights reserved. This information is not intended as a substitute for professional medical care. Always follow your healthcare professional's instructions.

## 2019-08-16 NOTE — LETTER
August 16, 2019      Aruy Sequeira MD  [address] [facility] [place] [address]           [facility]  [address] [address]  Phone: [phone]  Fax: [phone]          Patient: Irene Lawton   MR Number: [id number]   YOB: 1960   Date of Visit: 8/16/2019       Dear Dr. Aury Sequeira:    Thank you for referring Irene Lawton to me for evaluation. Attached you will find relevant portions of my assessment and plan of care.    If you have questions, please do not hesitate to call me. I look forward to following Irene Lawton along with you.    Sincerely,    Ayush Lopez MD    Enclosure  CC:  No Recipients    If you would like to receive this communication electronically, please contact [email or web address] or [phone] to request more information on [facility] access.    For providers and/or their staff who would like to refer a patient to [facility], please contact us through our one-stop-shop provider referral line, [facility] , at [phone].    If you feel you have received this communication in error or would no longer like to receive these types of communications, please e-mail [email or web address]

## 2019-08-19 ENCOUNTER — PATIENT MESSAGE (OUTPATIENT)
Dept: INTERNAL MEDICINE | Facility: CLINIC | Age: 59
End: 2019-08-19

## 2019-08-19 ENCOUNTER — LAB VISIT (OUTPATIENT)
Dept: LAB | Facility: OTHER | Age: 59
End: 2019-08-19
Payer: COMMERCIAL

## 2019-08-19 ENCOUNTER — TELEPHONE (OUTPATIENT)
Dept: INTERNAL MEDICINE | Facility: CLINIC | Age: 59
End: 2019-08-19

## 2019-08-19 DIAGNOSIS — R31.29 MICROSCOPIC HEMATURIA: ICD-10-CM

## 2019-08-19 DIAGNOSIS — R74.8 ABNORMAL LIVER ENZYMES: ICD-10-CM

## 2019-08-19 PROCEDURE — 87086 URINE CULTURE/COLONY COUNT: CPT

## 2019-08-19 RX ORDER — CIPROFLOXACIN 500 MG/1
500 TABLET ORAL 2 TIMES DAILY
Qty: 14 TABLET | Refills: 0 | Status: SHIPPED | OUTPATIENT
Start: 2019-08-19 | End: 2019-08-26

## 2019-08-20 LAB — BACTERIA UR CULT: NORMAL

## 2019-08-21 ENCOUNTER — PATIENT MESSAGE (OUTPATIENT)
Dept: INTERNAL MEDICINE | Facility: CLINIC | Age: 59
End: 2019-08-21

## 2019-08-21 ENCOUNTER — TELEPHONE (OUTPATIENT)
Dept: INTERNAL MEDICINE | Facility: CLINIC | Age: 59
End: 2019-08-21

## 2019-09-23 RX ORDER — FLUTICASONE PROPIONATE 50 MCG
1 SPRAY, SUSPENSION (ML) NASAL DAILY
Qty: 1 BOTTLE | Refills: 3 | Status: SHIPPED | OUTPATIENT
Start: 2019-09-23 | End: 2020-07-01

## 2020-01-20 ENCOUNTER — PATIENT MESSAGE (OUTPATIENT)
Dept: INTERNAL MEDICINE | Facility: CLINIC | Age: 60
End: 2020-01-20

## 2020-01-21 ENCOUNTER — TELEPHONE (OUTPATIENT)
Dept: INTERNAL MEDICINE | Facility: CLINIC | Age: 60
End: 2020-01-21

## 2020-01-21 ENCOUNTER — PATIENT MESSAGE (OUTPATIENT)
Dept: INTERNAL MEDICINE | Facility: CLINIC | Age: 60
End: 2020-01-21

## 2020-01-24 ENCOUNTER — OFFICE VISIT (OUTPATIENT)
Dept: INTERNAL MEDICINE | Facility: CLINIC | Age: 60
End: 2020-01-24
Payer: COMMERCIAL

## 2020-01-24 ENCOUNTER — LAB VISIT (OUTPATIENT)
Dept: LAB | Facility: OTHER | Age: 60
End: 2020-01-24
Attending: INTERNAL MEDICINE
Payer: COMMERCIAL

## 2020-01-24 VITALS
BODY MASS INDEX: 31.81 KG/M2 | HEIGHT: 64 IN | WEIGHT: 186.31 LBS | RESPIRATION RATE: 18 BRPM | HEART RATE: 92 BPM | TEMPERATURE: 99 F | SYSTOLIC BLOOD PRESSURE: 142 MMHG | DIASTOLIC BLOOD PRESSURE: 72 MMHG

## 2020-01-24 DIAGNOSIS — K59.00 CONSTIPATION, UNSPECIFIED CONSTIPATION TYPE: ICD-10-CM

## 2020-01-24 DIAGNOSIS — K59.00 CONSTIPATION, UNSPECIFIED CONSTIPATION TYPE: Primary | ICD-10-CM

## 2020-01-24 LAB — TSH SERPL DL<=0.005 MIU/L-ACNC: 3.42 UIU/ML (ref 0.4–4)

## 2020-01-24 PROCEDURE — 3008F BODY MASS INDEX DOCD: CPT | Mod: CPTII,S$GLB,, | Performed by: INTERNAL MEDICINE

## 2020-01-24 PROCEDURE — 99213 OFFICE O/P EST LOW 20 MIN: CPT | Mod: S$GLB,,, | Performed by: INTERNAL MEDICINE

## 2020-01-24 PROCEDURE — 3008F PR BODY MASS INDEX (BMI) DOCUMENTED: ICD-10-PCS | Mod: CPTII,S$GLB,, | Performed by: INTERNAL MEDICINE

## 2020-01-24 PROCEDURE — 3077F SYST BP >= 140 MM HG: CPT | Mod: CPTII,S$GLB,, | Performed by: INTERNAL MEDICINE

## 2020-01-24 PROCEDURE — 99999 PR PBB SHADOW E&M-EST. PATIENT-LVL III: CPT | Mod: PBBFAC,,, | Performed by: INTERNAL MEDICINE

## 2020-01-24 PROCEDURE — 3077F PR MOST RECENT SYSTOLIC BLOOD PRESSURE >= 140 MM HG: ICD-10-PCS | Mod: CPTII,S$GLB,, | Performed by: INTERNAL MEDICINE

## 2020-01-24 PROCEDURE — 3078F PR MOST RECENT DIASTOLIC BLOOD PRESSURE < 80 MM HG: ICD-10-PCS | Mod: CPTII,S$GLB,, | Performed by: INTERNAL MEDICINE

## 2020-01-24 PROCEDURE — 84443 ASSAY THYROID STIM HORMONE: CPT

## 2020-01-24 PROCEDURE — 36415 COLL VENOUS BLD VENIPUNCTURE: CPT

## 2020-01-24 PROCEDURE — 3078F DIAST BP <80 MM HG: CPT | Mod: CPTII,S$GLB,, | Performed by: INTERNAL MEDICINE

## 2020-01-24 PROCEDURE — 99999 PR PBB SHADOW E&M-EST. PATIENT-LVL III: ICD-10-PCS | Mod: PBBFAC,,, | Performed by: INTERNAL MEDICINE

## 2020-01-24 PROCEDURE — 99213 PR OFFICE/OUTPT VISIT, EST, LEVL III, 20-29 MIN: ICD-10-PCS | Mod: S$GLB,,, | Performed by: INTERNAL MEDICINE

## 2020-01-24 RX ORDER — NAPROXEN AND ESOMEPRAZOLE MAGNESIUM 500; 20 MG/1; MG/1
TABLET, DELAYED RELEASE ORAL
COMMUNITY
Start: 2019-12-02 | End: 2021-01-15

## 2020-01-24 RX ORDER — CYCLOBENZAPRINE HCL 10 MG
TABLET ORAL
COMMUNITY
Start: 2020-01-04 | End: 2021-01-15

## 2020-01-24 RX ORDER — TRAMADOL HYDROCHLORIDE 50 MG/1
TABLET ORAL
COMMUNITY
Start: 2020-01-04 | End: 2021-01-15

## 2020-01-24 RX ORDER — LACTULOSE 10 G/15ML
10 SOLUTION ORAL DAILY PRN
Qty: 150 ML | Refills: 0 | Status: SHIPPED | OUTPATIENT
Start: 2020-01-24 | End: 2020-02-03

## 2020-01-24 RX ORDER — FAMOTIDINE 20 MG/1
TABLET, FILM COATED ORAL
COMMUNITY
Start: 2020-01-04 | End: 2021-01-15

## 2020-01-24 RX ORDER — DICLOFENAC SODIUM 20 MG/G
SOLUTION TOPICAL
COMMUNITY
Start: 2019-12-02 | End: 2021-01-15

## 2020-01-24 RX ORDER — MELOXICAM 15 MG/1
TABLET ORAL
COMMUNITY
Start: 2020-01-04 | End: 2021-01-15
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
## 2020-01-28 ENCOUNTER — PATIENT MESSAGE (OUTPATIENT)
Dept: INTERNAL MEDICINE | Facility: CLINIC | Age: 60
End: 2020-01-28

## 2020-01-28 ENCOUNTER — TELEPHONE (OUTPATIENT)
Dept: INTERNAL MEDICINE | Facility: CLINIC | Age: 60
End: 2020-01-28

## 2020-01-28 DIAGNOSIS — K59.00 CONSTIPATION, UNSPECIFIED CONSTIPATION TYPE: Primary | ICD-10-CM

## 2020-01-28 NOTE — TELEPHONE ENCOUNTER
Recommend patient have KUB performed and referral to GI.  Will start treatment based on KUB findings.

## 2020-01-29 ENCOUNTER — PATIENT MESSAGE (OUTPATIENT)
Dept: INTERNAL MEDICINE | Facility: CLINIC | Age: 60
End: 2020-01-29

## 2020-04-21 DIAGNOSIS — Z01.84 ANTIBODY RESPONSE EXAMINATION: ICD-10-CM

## 2020-04-23 ENCOUNTER — LAB VISIT (OUTPATIENT)
Dept: LAB | Facility: OTHER | Age: 60
End: 2020-04-23
Attending: INTERNAL MEDICINE
Payer: COMMERCIAL

## 2020-04-23 DIAGNOSIS — Z01.84 ANTIBODY RESPONSE EXAMINATION: ICD-10-CM

## 2020-04-23 LAB — SARS-COV-2 IGG SERPL QL IA: NEGATIVE

## 2020-04-23 PROCEDURE — 36415 COLL VENOUS BLD VENIPUNCTURE: CPT

## 2020-04-23 PROCEDURE — 86769 SARS-COV-2 COVID-19 ANTIBODY: CPT

## 2020-07-01 RX ORDER — FLUTICASONE PROPIONATE 50 MCG
SPRAY, SUSPENSION (ML) NASAL
Qty: 16 G | Refills: 3 | Status: SHIPPED | OUTPATIENT
Start: 2020-07-01 | End: 2021-01-15 | Stop reason: SDUPTHER

## 2020-07-01 RX ORDER — HYDROCHLOROTHIAZIDE 25 MG/1
TABLET ORAL
Qty: 30 TABLET | Refills: 3 | Status: SHIPPED | OUTPATIENT
Start: 2020-07-01 | End: 2021-02-21 | Stop reason: SDUPTHER

## 2020-09-04 ENCOUNTER — TELEPHONE (OUTPATIENT)
Dept: INTERNAL MEDICINE | Facility: CLINIC | Age: 60
End: 2020-09-04

## 2020-09-04 DIAGNOSIS — Z12.11 COLON CANCER SCREENING: ICD-10-CM

## 2020-09-04 DIAGNOSIS — Z12.31 ENCOUNTER FOR SCREENING MAMMOGRAM FOR BREAST CANCER: Primary | ICD-10-CM

## 2020-09-16 ENCOUNTER — HOSPITAL ENCOUNTER (OUTPATIENT)
Dept: RADIOLOGY | Facility: OTHER | Age: 60
Discharge: HOME | End: 2020-09-16
Attending: INTERNAL MEDICINE
Payer: COMMERCIAL

## 2020-09-16 DIAGNOSIS — Z12.31 ENCOUNTER FOR SCREENING MAMMOGRAM FOR BREAST CANCER: ICD-10-CM

## 2020-09-16 PROCEDURE — 77067 MAMMO DIGITAL SCREENING BILAT WITH TOMO: ICD-10-PCS | Mod: 26,,, | Performed by: RADIOLOGY

## 2020-09-16 PROCEDURE — 77067 SCR MAMMO BI INCL CAD: CPT | Mod: 26,,, | Performed by: RADIOLOGY

## 2020-09-16 PROCEDURE — 77063 BREAST TOMOSYNTHESIS BI: CPT | Mod: 26,,, | Performed by: RADIOLOGY

## 2020-09-16 PROCEDURE — 77067 SCR MAMMO BI INCL CAD: CPT | Mod: TC

## 2020-09-16 PROCEDURE — 77063 MAMMO DIGITAL SCREENING BILAT WITH TOMO: ICD-10-PCS | Mod: 26,,, | Performed by: RADIOLOGY

## 2020-11-11 DIAGNOSIS — Z01.818 PRE-OP TESTING: ICD-10-CM

## 2020-11-11 DIAGNOSIS — Z12.11 SPECIAL SCREENING FOR MALIGNANT NEOPLASMS, COLON: Primary | ICD-10-CM

## 2020-11-11 RX ORDER — SODIUM, POTASSIUM,MAG SULFATES 17.5-3.13G
1 SOLUTION, RECONSTITUTED, ORAL ORAL DAILY
Qty: 1 KIT | Refills: 0 | Status: SHIPPED | OUTPATIENT
Start: 2020-11-11 | End: 2020-11-13

## 2020-12-09 ENCOUNTER — HOSPITAL ENCOUNTER (OUTPATIENT)
Dept: PREADMISSION TESTING | Facility: OTHER | Age: 60
Discharge: HOME | End: 2020-12-09
Attending: ANESTHESIOLOGY
Payer: COMMERCIAL

## 2020-12-09 DIAGNOSIS — Z01.818 PRE-OP TESTING: ICD-10-CM

## 2020-12-09 PROCEDURE — U0003 INFECTIOUS AGENT DETECTION BY NUCLEIC ACID (DNA OR RNA); SEVERE ACUTE RESPIRATORY SYNDROME CORONAVIRUS 2 (SARS-COV-2) (CORONAVIRUS DISEASE [COVID-19]), AMPLIFIED PROBE TECHNIQUE, MAKING USE OF HIGH THROUGHPUT TECHNOLOGIES AS DESCRIBED BY CMS-2020-01-R: HCPCS

## 2020-12-10 LAB — SARS-COV-2 RNA RESP QL NAA+PROBE: NOT DETECTED

## 2020-12-12 ENCOUNTER — ANESTHESIA EVENT (OUTPATIENT)
Dept: ENDOSCOPY | Facility: HOSPITAL | Age: 60
End: 2020-12-12
Payer: COMMERCIAL

## 2020-12-12 ENCOUNTER — HOSPITAL ENCOUNTER (OUTPATIENT)
Facility: HOSPITAL | Age: 60
Discharge: HOME | End: 2020-12-12
Attending: COLON & RECTAL SURGERY | Admitting: COLON & RECTAL SURGERY
Payer: COMMERCIAL

## 2020-12-12 ENCOUNTER — ANESTHESIA (OUTPATIENT)
Dept: ENDOSCOPY | Facility: HOSPITAL | Age: 60
End: 2020-12-12
Payer: COMMERCIAL

## 2020-12-12 VITALS
OXYGEN SATURATION: 99 % | TEMPERATURE: 97 F | HEART RATE: 65 BPM | HEIGHT: 63 IN | BODY MASS INDEX: 32.78 KG/M2 | DIASTOLIC BLOOD PRESSURE: 78 MMHG | WEIGHT: 185 LBS | SYSTOLIC BLOOD PRESSURE: 165 MMHG | RESPIRATION RATE: 15 BRPM

## 2020-12-12 DIAGNOSIS — Z12.11 SCREEN FOR COLON CANCER: ICD-10-CM

## 2020-12-12 DIAGNOSIS — Z12.11 ENCOUNTER FOR SCREENING COLONOSCOPY: Primary | ICD-10-CM

## 2020-12-12 PROCEDURE — 45385 COLONOSCOPY W/LESION REMOVAL: CPT | Performed by: COLON & RECTAL SURGERY

## 2020-12-12 PROCEDURE — 45385 PR COLONOSCOPY,REMV LESN,SNARE: ICD-10-PCS | Mod: 33,,, | Performed by: COLON & RECTAL SURGERY

## 2020-12-12 PROCEDURE — E9220 PRA ENDO ANESTHESIA: HCPCS | Mod: 33,,, | Performed by: NURSE ANESTHETIST, CERTIFIED REGISTERED

## 2020-12-12 PROCEDURE — E9220 PRA ENDO ANESTHESIA: ICD-10-PCS | Mod: 33,,, | Performed by: NURSE ANESTHETIST, CERTIFIED REGISTERED

## 2020-12-12 PROCEDURE — 37000008 HC ANESTHESIA 1ST 15 MINUTES: Performed by: COLON & RECTAL SURGERY

## 2020-12-12 PROCEDURE — 88305 TISSUE EXAM BY PATHOLOGIST: ICD-10-PCS | Mod: 26,,, | Performed by: PATHOLOGY

## 2020-12-12 PROCEDURE — 88305 TISSUE EXAM BY PATHOLOGIST: CPT | Mod: 26,,, | Performed by: PATHOLOGY

## 2020-12-12 PROCEDURE — 45385 COLONOSCOPY W/LESION REMOVAL: CPT | Mod: 33,,, | Performed by: COLON & RECTAL SURGERY

## 2020-12-12 PROCEDURE — 63600175 PHARM REV CODE 636 W HCPCS: Performed by: NURSE ANESTHETIST, CERTIFIED REGISTERED

## 2020-12-12 PROCEDURE — 27201089 HC SNARE, DISP (ANY): Performed by: COLON & RECTAL SURGERY

## 2020-12-12 PROCEDURE — 25000003 PHARM REV CODE 250: Performed by: COLON & RECTAL SURGERY

## 2020-12-12 PROCEDURE — 88305 TISSUE EXAM BY PATHOLOGIST: CPT | Performed by: PATHOLOGY

## 2020-12-12 PROCEDURE — 37000009 HC ANESTHESIA EA ADD 15 MINS: Performed by: COLON & RECTAL SURGERY

## 2020-12-12 RX ORDER — LIDOCAINE HCL/PF 100 MG/5ML
SYRINGE (ML) INTRAVENOUS
Status: DISCONTINUED | OUTPATIENT
Start: 2020-12-12 | End: 2020-12-12

## 2020-12-12 RX ORDER — SODIUM CHLORIDE 9 MG/ML
INJECTION, SOLUTION INTRAVENOUS CONTINUOUS
Status: DISCONTINUED | OUTPATIENT
Start: 2020-12-12 | End: 2020-12-12 | Stop reason: HOSPADM

## 2020-12-12 RX ORDER — PROPOFOL 10 MG/ML
VIAL (ML) INTRAVENOUS CONTINUOUS PRN
Status: DISCONTINUED | OUTPATIENT
Start: 2020-12-12 | End: 2020-12-12

## 2020-12-12 RX ORDER — PROPOFOL 10 MG/ML
VIAL (ML) INTRAVENOUS
Status: DISCONTINUED | OUTPATIENT
Start: 2020-12-12 | End: 2020-12-12

## 2020-12-12 RX ADMIN — SODIUM CHLORIDE 10 ML/HR: 0.9 INJECTION, SOLUTION INTRAVENOUS at 10:12

## 2020-12-12 RX ADMIN — PROPOFOL 150 MCG/KG/MIN: 10 INJECTION, EMULSION INTRAVENOUS at 10:12

## 2020-12-12 RX ADMIN — Medication 100 MG: at 10:12

## 2020-12-12 RX ADMIN — PROPOFOL 50 MG: 10 INJECTION, EMULSION INTRAVENOUS at 10:12

## 2020-12-12 NOTE — TRANSFER OF CARE
"Anesthesia Transfer of Care Note    Patient: Irene Lawton    Procedure(s) Performed: Procedure(s) (LRB):  COLONOSCOPY (N/A)    Patient location: GI    Anesthesia Type: general    Transport from OR: Transported from OR on room air with adequate spontaneous ventilation    Post pain: adequate analgesia    Post assessment: no apparent anesthetic complications and tolerated procedure well    Post vital signs: stable    Level of consciousness: sedated and responds to stimulation    Nausea/Vomiting: no nausea/vomiting    Complications: none    Transfer of care protocol was followed      Last vitals:   Visit Vitals  BP (!) 121/58 (BP Location: Left arm, Patient Position: Lying)   Pulse 61   Temp 36.3 °C (97.3 °F) (Temporal)   Resp 16   Ht 5' 3" (1.6 m)   Wt 83.9 kg (185 lb)   LMP 06/27/2012   SpO2 95%   Breastfeeding No   BMI 32.77 kg/m²     "

## 2020-12-12 NOTE — DISCHARGE INSTRUCTIONS

## 2020-12-12 NOTE — H&P
COLONOSCOPY HISTORY AND PE    Procedure : Colonoscopy      INDICATIONS: asymptomatic screening exam      Past Medical History:   Diagnosis Date    ALLERGIC RHINITIS     Anemia     Anxiety     Cancer     right neck    Hypertension     Transient elevated blood pressure        Past Surgical History:   Procedure Laterality Date    Surgical removal neck cancer Right     TUBAL LIGATION         Review of patient's allergies indicates:   Allergen Reactions    No known allergies        No current facility-administered medications on file prior to encounter.      Current Outpatient Medications on File Prior to Encounter   Medication Sig Dispense Refill    cyclobenzaprine (FLEXERIL) 10 MG tablet       DUEXIS 800-26.6 mg Tab TAKE ONE TABLET BY MOUTH THREE TIMES DAILY AS NEEDED 90 tablet 0    famotidine (PEPCID) 20 MG tablet       fluticasone propionate (FLONASE) 50 mcg/actuation nasal spray Use 1 spray(s) in each nostril once daily 16 g 3    hydroCHLOROthiazide (HYDRODIURIL) 25 MG tablet Take 1 tablet by mouth once daily 30 tablet 3    meloxicam (MOBIC) 15 MG tablet       MULTIVITAMIN ORAL Take by mouth.      PENNSAID 20 mg/gram /actuation(2 %) sopm APPLY 2 PUMPS TO THE AFFECTED KNEE(S) TWICE DAILY      traMADol (ULTRAM) 50 mg tablet       VIMOVO 500-20 mg TbID TAKE ONE TABLET BY MOUTH TWICE DAILY AS NEEDED FOR BREAKTHROUGH PAIN         Family History   Problem Relation Age of Onset    Hypertension Mother     Breast cancer Mother     Colon cancer Neg Hx     Ovarian cancer Neg Hx        Social History     Socioeconomic History    Marital status: Single     Spouse name: Not on file    Number of children: 2    Years of education: 14 years    Highest education level: Not on file   Occupational History    Occupation: [employer] manager     Employer: [id number]   Social Needs    Financial resource strain: Somewhat hard    Food insecurity     Worry: Sometimes true     Inability: Sometimes true     Transportation needs     Medical: Not on file     Non-medical: Not on file   Tobacco Use    Smoking status: Never Smoker    Smokeless tobacco: Never Used   Substance and Sexual Activity    Alcohol use: Yes     Frequency: Monthly or less     Drinks per session: 1 or 2     Binge frequency: Less than monthly     Comment: Social    Drug use: No    Sexual activity: Yes     Partners: Male     Birth control/protection: Surgical   Lifestyle    Physical activity     Days per week: 2 days     Minutes per session: Not on file    Stress: To some extent   Relationships    Social connections     Talks on phone: Twice a week     Gets together: Once a week     Attends [employer] service: Not on file     Active member of club or organization: No     Attends meetings of clubs or organizations: Never     Relationship status: Patient refused   Other Topics Concern    Not on file   Social History Narrative    Works at [employer] and does not exercise regularly       Review of Systems -    Respiratory : no cough, shortness of breath, or wheezing  Cardiovascular  no chest pain or dyspnea on exertion  Gastrointestinal no abdominal pain, change in bowel habits, or black or bloody stools  Musculoskeletal no deformities, swelling  Neurological no TIA or stroke symptoms        Physical Exam:  General: NAD  AT NC EOMI  Mallampati Score   Neck supple, trachea midline  Lungs: nl excursions, no retractions.  Breathing comfortably  Abdomen ND soft NT.  No masses  Extremities: No CCE.      ASA:  II    PLAN  COLONOSCOPY.  The details of the procedure, the possible need for biopsy or polypectomy and the potential risks including bleeding, perforation, missed polyps were discussed in detail.

## 2020-12-12 NOTE — PLAN OF CARE
POC reiviewed with pt. Pt verbalized understanding of discharge instructions including diet, s/s to notify md,  and follow up. pt refused wheelchair and will ambulate with family

## 2020-12-12 NOTE — ANESTHESIA POSTPROCEDURE EVALUATION
Anesthesia Post Evaluation    Patient: Irene Lawton    Procedure(s) Performed: Procedure(s) (LRB):  COLONOSCOPY (N/A)    Final Anesthesia Type: general    Patient location during evaluation: GI PACU  Patient participation: Yes- Able to Participate  Level of consciousness: awake and alert and oriented  Post-procedure vital signs: reviewed and stable  Pain management: adequate  Airway patency: patent    PONV status at discharge: No PONV  Anesthetic complications: no      Cardiovascular status: blood pressure returned to baseline and hemodynamically stable  Respiratory status: spontaneous ventilation, unassisted and room air  Hydration status: euvolemic  Follow-up not needed.          Vitals Value Taken Time   /78 12/12/20 1200   Temp 36.3 °C (97.3 °F) 12/12/20 1128   Pulse 65 12/12/20 1200   Resp 15 12/12/20 1200   SpO2 99 % 12/12/20 1200         Event Time   Out of Recovery 12:01:09         Pain/Nae Score: Nae Score: 10 (12/12/2020 12:00 PM)

## 2020-12-12 NOTE — [employer] PATIENT INSTRUCTIONS
Discharge Summary/Instructions after an Endoscopic Procedure  Patient Name: Irene Lawton  Patient MRN: [id number]  Patient YOB: 1960 Saturday, December 12, 2020  Sergio Gamez MD  RESTRICTIONS:  During your procedure today, you received medications for sedation.  These   medications may affect your judgment, balance and coordination.  Therefore,   for 24 hours, you have the following restrictions:   - DO NOT drive a car, operate machinery, make legal/financial decisions,   sign important papers or drink alcohol.    ACTIVITY:  Today: no heavy lifting, straining or running due to procedural   sedation/anesthesia.  The following day: return to full activity including work.  DIET:  Eat and drink normally unless instructed otherwise.     TREATMENT FOR COMMON SIDE EFFECTS:  - Mild abdominal pain, nausea, belching, bloating or excessive gas:  rest,   eat lightly and use a heating pad.  - Sore Throat: treat with throat lozenges and/or gargle with warm salt   water.  - Because air was used during the procedure, expelling large amounts of air   from your rectum or belching is normal.  - If a bowel prep was taken, you may not have a bowel movement for 1-3 days.    This is normal.  SYMPTOMS TO WATCH FOR AND REPORT TO YOUR PHYSICIAN:  1. Abdominal pain or bloating, other than gas cramps.  2. Chest pain.  3. Back pain.  4. Signs of infection such as: chills or fever occurring within 24 hours   after the procedure.  5. Rectal bleeding, which would show as bright red, maroon, or black stools.   (A tablespoon of blood from the rectum is not serious, especially if   hemorrhoids are present.)  6. Vomiting.  7. Weakness or dizziness.  GO DIRECTLY TO THE NEAREST EMERGENCY ROOM IF YOU HAVE ANY OF THE FOLLOWING:      Difficulty breathing              Chills and/or fever over 101 F   Persistent vomiting and/or vomiting blood   Severe abdominal pain   Severe chest pain   Black, tarry stools   Bleeding- more than one  tablespoon   Any other symptom or condition that you feel may need urgent attention  Your doctor recommends these additional instructions:  If any biopsies were taken, your doctors clinic will contact you in 1 to 2   weeks with any results.  - Discharge patient to home (ambulatory).   - Patient has a contact number available for emergencies.  The signs and   symptoms of potential delayed complications were discussed with the   patient.  Return to normal activities tomorrow.  Written discharge   instructions were provided to the patient.   - Resume previous diet.   - Continue present medications.   - Await pathology results.   - Repeat colonoscopy in 5-10 years for surveillance based on pathology   results.  For questions, problems or results please call your physician - Sergio Gamez MD at Work:  [phone].  [facility] [place], EMERGENCY ROOM PHONE NUMBER: [phone]  IF A COMPLICATION OR EMERGENCY SITUATION ARISES AND YOU ARE UNABLE TO REACH   YOUR PHYSICIAN - GO DIRECTLY TO THE EMERGENCY ROOM.  Sergio Gamez MD  12/12/2020 11:25:14 AM  This report has been verified and signed electronically.  [employer]

## 2020-12-12 NOTE — ANESTHESIA PREPROCEDURE EVALUATION
12/12/2020  Irene Lawton is a 60 y.o., female.  Past Medical History:   Diagnosis Date    ALLERGIC RHINITIS     Anemia     Anxiety     Cancer     right neck    Hypertension     Transient elevated blood pressure      Past Surgical History:   Procedure Laterality Date    Surgical removal neck cancer Right     TUBAL LIGATION           Anesthesia Evaluation    I have reviewed the Patient Summary Reports.      I have reviewed the Medications.     Review of Systems  Anesthesia Hx:  Denies Family Hx of Anesthesia complications.   Denies Personal Hx of Anesthesia complications.   Hematology/Oncology:  Hematology Normal   Oncology Normal     EENT/Dental:EENT/Dental Normal   Cardiovascular:  Cardiovascular Normal     Pulmonary:  Pulmonary Normal    Renal/:  Renal/ Normal     Hepatic/GI:  Hepatic/GI Normal    Musculoskeletal:  Musculoskeletal Normal    Neurological:  Neurology Normal    Endocrine:  Endocrine Normal    Dermatological:  Skin Normal    Psych:  Psychiatric Normal           Physical Exam  General:  Well nourished    Airway/Jaw/Neck:  AIRWAY FINDINGS: Normal      Eyes/Ears/Nose:  EYES/EARS/NOSE FINDINGS: Normal   Dental:  DENTAL FINDINGS: Normal   Chest/Lungs:  Chest/Lungs Clear    Heart/Vascular:  Heart Findings: Normal Heart murmur: negative Vascular Findings: Normal    Abdomen:  Abdomen Findings: Normal    Musculoskeletal:  Musculoskeletal Findings: Normal   Skin:  Skin Findings: Normal    Mental Status:  Mental Status Findings: Normal        Anesthesia Plan  Type of Anesthesia, risks & benefits discussed:  Anesthesia Type:  general  Patient's Preference:   Intra-op Monitoring Plan: standard ASA monitors  Intra-op Monitoring Plan Comments:   Post Op Pain Control Plan:   Post Op Pain Control Plan Comments:   Induction:   IV  Beta Blocker:  Patient is not currently on a Beta-Blocker (No  further documentation required).       Informed Consent: Patient understands risks and agrees with Anesthesia plan.  Questions answered. Anesthesia consent signed with patient.  ASA Score: 2     Day of Surgery Review of History & Physical:    H&P update referred to the provider.         Ready For Surgery From Anesthesia Perspective.

## 2020-12-17 LAB
FINAL PATHOLOGIC DIAGNOSIS: NORMAL
GROSS: NORMAL
Lab: NORMAL

## 2020-12-21 NOTE — PROGRESS NOTES
Adenoma or history of polyps    Repeat colonoscopy in 5 years       If you have any questions or if I can clarify any of the above please contact me:    [place] [phone]   Pager [phone]  email [email or web address]  Nurse Glendy Renee [phone]  :  [phone]    Sincerely  H, Sergio Gamez MD, FACS, FASCRS  Staff Surgeon  Dept of Colon and Rectal Surgery

## 2021-01-15 ENCOUNTER — OFFICE VISIT (OUTPATIENT)
Dept: INTERNAL MEDICINE | Facility: CLINIC | Age: 61
End: 2021-01-15
Payer: COMMERCIAL

## 2021-01-15 VITALS
HEART RATE: 88 BPM | SYSTOLIC BLOOD PRESSURE: 130 MMHG | TEMPERATURE: 98 F | HEIGHT: 63 IN | RESPIRATION RATE: 18 BRPM | BODY MASS INDEX: 33.01 KG/M2 | WEIGHT: 186.31 LBS | DIASTOLIC BLOOD PRESSURE: 88 MMHG

## 2021-01-15 DIAGNOSIS — Z00.00 ROUTINE MEDICAL EXAM: Primary | ICD-10-CM

## 2021-01-15 DIAGNOSIS — G47.00 INSOMNIA, UNSPECIFIED TYPE: Primary | ICD-10-CM

## 2021-01-15 PROCEDURE — 1125F PR PAIN SEVERITY QUANTIFIED, PAIN PRESENT: ICD-10-PCS | Mod: S$GLB,,, | Performed by: INTERNAL MEDICINE

## 2021-01-15 PROCEDURE — 99999 PR PBB SHADOW E&M-EST. PATIENT-LVL III: CPT | Mod: PBBFAC,,, | Performed by: INTERNAL MEDICINE

## 2021-01-15 PROCEDURE — 3008F BODY MASS INDEX DOCD: CPT | Mod: CPTII,S$GLB,, | Performed by: INTERNAL MEDICINE

## 2021-01-15 PROCEDURE — 3079F PR MOST RECENT DIASTOLIC BLOOD PRESSURE 80-89 MM HG: ICD-10-PCS | Mod: CPTII,S$GLB,, | Performed by: INTERNAL MEDICINE

## 2021-01-15 PROCEDURE — 3075F SYST BP GE 130 - 139MM HG: CPT | Mod: CPTII,S$GLB,, | Performed by: INTERNAL MEDICINE

## 2021-01-15 PROCEDURE — 1125F AMNT PAIN NOTED PAIN PRSNT: CPT | Mod: S$GLB,,, | Performed by: INTERNAL MEDICINE

## 2021-01-15 PROCEDURE — 3075F PR MOST RECENT SYSTOLIC BLOOD PRESS GE 130-139MM HG: ICD-10-PCS | Mod: CPTII,S$GLB,, | Performed by: INTERNAL MEDICINE

## 2021-01-15 PROCEDURE — 3079F DIAST BP 80-89 MM HG: CPT | Mod: CPTII,S$GLB,, | Performed by: INTERNAL MEDICINE

## 2021-01-15 PROCEDURE — 99396 PR PREVENTIVE VISIT,EST,40-64: ICD-10-PCS | Mod: S$GLB,,, | Performed by: INTERNAL MEDICINE

## 2021-01-15 PROCEDURE — 3008F PR BODY MASS INDEX (BMI) DOCUMENTED: ICD-10-PCS | Mod: CPTII,S$GLB,, | Performed by: INTERNAL MEDICINE

## 2021-01-15 PROCEDURE — 99396 PREV VISIT EST AGE 40-64: CPT | Mod: S$GLB,,, | Performed by: INTERNAL MEDICINE

## 2021-01-15 PROCEDURE — 99999 PR PBB SHADOW E&M-EST. PATIENT-LVL III: ICD-10-PCS | Mod: PBBFAC,,, | Performed by: INTERNAL MEDICINE

## 2021-01-15 RX ORDER — FLUTICASONE PROPIONATE 50 MCG
1 SPRAY, SUSPENSION (ML) NASAL DAILY
Qty: 16 G | Refills: 3 | Status: SHIPPED | OUTPATIENT
Start: 2021-01-15 | End: 2021-08-27

## 2021-01-15 RX ORDER — IBUPROFEN AND FAMOTIDINE 800; 26.6 MG/1; MG/1
1 TABLET, COATED ORAL 3 TIMES DAILY PRN
Qty: 90 TABLET | Refills: 0 | Status: SHIPPED | OUTPATIENT
Start: 2021-01-15 | End: 2021-01-15

## 2021-01-18 ENCOUNTER — PATIENT MESSAGE (OUTPATIENT)
Dept: INTERNAL MEDICINE | Facility: CLINIC | Age: 61
End: 2021-01-18

## 2021-01-20 ENCOUNTER — LAB VISIT (OUTPATIENT)
Dept: LAB | Facility: OTHER | Age: 61
End: 2021-01-20
Attending: INTERNAL MEDICINE
Payer: COMMERCIAL

## 2021-01-20 DIAGNOSIS — Z00.00 ROUTINE MEDICAL EXAM: ICD-10-CM

## 2021-01-20 LAB
BACTERIA #/AREA URNS HPF: NORMAL /HPF
BILIRUB UR QL STRIP: NEGATIVE
CLARITY UR: CLEAR
COLOR UR: YELLOW
GLUCOSE UR QL STRIP: NEGATIVE
HGB UR QL STRIP: ABNORMAL
HYALINE CASTS #/AREA URNS LPF: 0 /LPF
KETONES UR QL STRIP: NEGATIVE
LEUKOCYTE ESTERASE UR QL STRIP: NEGATIVE
MICROSCOPIC COMMENT: NORMAL
NITRITE UR QL STRIP: NEGATIVE
PH UR STRIP: 6 [PH] (ref 5–8)
PROT UR QL STRIP: NEGATIVE
RBC #/AREA URNS HPF: 1 /HPF (ref 0–4)
SP GR UR STRIP: 1.02 (ref 1–1.03)
SQUAMOUS #/AREA URNS HPF: 4 /HPF
URN SPEC COLLECT METH UR: ABNORMAL
UROBILINOGEN UR STRIP-ACNC: NEGATIVE EU/DL
WBC #/AREA URNS HPF: 2 /HPF (ref 0–5)

## 2021-01-20 PROCEDURE — 81000 URINALYSIS NONAUTO W/SCOPE: CPT

## 2021-01-21 ENCOUNTER — TELEPHONE (OUTPATIENT)
Dept: INTERNAL MEDICINE | Facility: CLINIC | Age: 61
End: 2021-01-21

## 2021-01-21 NOTE — TELEPHONE ENCOUNTER
Spoke w/ pt re; 's note & results    Pt does not want to start on any medications. Pt stated she was going to change her diet and exercise and see in a couple months if that changes her levels.    I advised pt to call if she had any concerns/questions

## 2021-01-21 NOTE — TELEPHONE ENCOUNTER
Please inform patient that labs are significant for elevated cholesterol and hemoglobin A1c.  Hemoglobin A1c is consistent with pre diabetes.  Recommend healthy diet and regular exercise.  In addition, recommend starting cholesterol-lowering medication.  Please advise if patient is amenable to starting Lipitor.

## 2021-01-28 ENCOUNTER — OFFICE VISIT (OUTPATIENT)
Dept: SLEEP MEDICINE | Facility: CLINIC | Age: 61
End: 2021-01-28
Payer: COMMERCIAL

## 2021-01-28 VITALS
HEART RATE: 67 BPM | SYSTOLIC BLOOD PRESSURE: 141 MMHG | DIASTOLIC BLOOD PRESSURE: 86 MMHG | BODY MASS INDEX: 33.09 KG/M2 | WEIGHT: 186.75 LBS | HEIGHT: 63 IN

## 2021-01-28 DIAGNOSIS — G47.00 INSOMNIA, UNSPECIFIED TYPE: ICD-10-CM

## 2021-01-28 PROCEDURE — 99204 PR OFFICE/OUTPT VISIT, NEW, LEVL IV, 45-59 MIN: ICD-10-PCS | Mod: S$GLB,,, | Performed by: INTERNAL MEDICINE

## 2021-01-28 PROCEDURE — 3079F DIAST BP 80-89 MM HG: CPT | Mod: CPTII,S$GLB,, | Performed by: INTERNAL MEDICINE

## 2021-01-28 PROCEDURE — 3079F PR MOST RECENT DIASTOLIC BLOOD PRESSURE 80-89 MM HG: ICD-10-PCS | Mod: CPTII,S$GLB,, | Performed by: INTERNAL MEDICINE

## 2021-01-28 PROCEDURE — 99204 OFFICE O/P NEW MOD 45 MIN: CPT | Mod: S$GLB,,, | Performed by: INTERNAL MEDICINE

## 2021-01-28 PROCEDURE — 1126F AMNT PAIN NOTED NONE PRSNT: CPT | Mod: S$GLB,,, | Performed by: INTERNAL MEDICINE

## 2021-01-28 PROCEDURE — 3077F SYST BP >= 140 MM HG: CPT | Mod: CPTII,S$GLB,, | Performed by: INTERNAL MEDICINE

## 2021-01-28 PROCEDURE — 99999 PR PBB SHADOW E&M-EST. PATIENT-LVL III: ICD-10-PCS | Mod: PBBFAC,,, | Performed by: INTERNAL MEDICINE

## 2021-01-28 PROCEDURE — 3008F BODY MASS INDEX DOCD: CPT | Mod: CPTII,S$GLB,, | Performed by: INTERNAL MEDICINE

## 2021-01-28 PROCEDURE — 3008F PR BODY MASS INDEX (BMI) DOCUMENTED: ICD-10-PCS | Mod: CPTII,S$GLB,, | Performed by: INTERNAL MEDICINE

## 2021-01-28 PROCEDURE — 1126F PR PAIN SEVERITY QUANTIFIED, NO PAIN PRESENT: ICD-10-PCS | Mod: S$GLB,,, | Performed by: INTERNAL MEDICINE

## 2021-01-28 PROCEDURE — 3077F PR MOST RECENT SYSTOLIC BLOOD PRESSURE >= 140 MM HG: ICD-10-PCS | Mod: CPTII,S$GLB,, | Performed by: INTERNAL MEDICINE

## 2021-01-28 PROCEDURE — 99999 PR PBB SHADOW E&M-EST. PATIENT-LVL III: CPT | Mod: PBBFAC,,, | Performed by: INTERNAL MEDICINE

## 2021-06-11 ENCOUNTER — OFFICE VISIT (OUTPATIENT)
Dept: OBSTETRICS AND GYNECOLOGY | Facility: CLINIC | Age: 61
End: 2021-06-11
Payer: COMMERCIAL

## 2021-06-11 VITALS
WEIGHT: 187.38 LBS | BODY MASS INDEX: 33.2 KG/M2 | HEIGHT: 63 IN | SYSTOLIC BLOOD PRESSURE: 114 MMHG | DIASTOLIC BLOOD PRESSURE: 78 MMHG

## 2021-06-11 DIAGNOSIS — Z01.419 WELL WOMAN EXAM WITH ROUTINE GYNECOLOGICAL EXAM: Primary | ICD-10-CM

## 2021-06-11 DIAGNOSIS — Z12.39 ENCOUNTER FOR SCREENING FOR MALIGNANT NEOPLASM OF BREAST, UNSPECIFIED SCREENING MODALITY: ICD-10-CM

## 2021-06-11 PROCEDURE — 99999 PR PBB SHADOW E&M-EST. PATIENT-LVL III: CPT | Mod: PBBFAC,,, | Performed by: OBSTETRICS & GYNECOLOGY

## 2021-06-11 PROCEDURE — 88142 CYTOPATH C/V THIN LAYER: CPT | Performed by: OBSTETRICS & GYNECOLOGY

## 2021-06-11 PROCEDURE — 3008F BODY MASS INDEX DOCD: CPT | Mod: CPTII,S$GLB,, | Performed by: OBSTETRICS & GYNECOLOGY

## 2021-06-11 PROCEDURE — 99396 PR PREVENTIVE VISIT,EST,40-64: ICD-10-PCS | Mod: S$GLB,,, | Performed by: OBSTETRICS & GYNECOLOGY

## 2021-06-11 PROCEDURE — 1126F PR PAIN SEVERITY QUANTIFIED, NO PAIN PRESENT: ICD-10-PCS | Mod: S$GLB,,, | Performed by: OBSTETRICS & GYNECOLOGY

## 2021-06-11 PROCEDURE — 99999 PR PBB SHADOW E&M-EST. PATIENT-LVL III: ICD-10-PCS | Mod: PBBFAC,,, | Performed by: OBSTETRICS & GYNECOLOGY

## 2021-06-11 PROCEDURE — 1126F AMNT PAIN NOTED NONE PRSNT: CPT | Mod: S$GLB,,, | Performed by: OBSTETRICS & GYNECOLOGY

## 2021-06-11 PROCEDURE — 3008F PR BODY MASS INDEX (BMI) DOCUMENTED: ICD-10-PCS | Mod: CPTII,S$GLB,, | Performed by: OBSTETRICS & GYNECOLOGY

## 2021-06-11 PROCEDURE — 99396 PREV VISIT EST AGE 40-64: CPT | Mod: S$GLB,,, | Performed by: OBSTETRICS & GYNECOLOGY

## 2021-06-11 PROCEDURE — 87624 HPV HI-RISK TYP POOLED RSLT: CPT | Performed by: OBSTETRICS & GYNECOLOGY

## 2021-06-17 LAB
HPV HR 12 DNA SPEC QL NAA+PROBE: NEGATIVE
HPV16 AG SPEC QL: NEGATIVE
HPV18 DNA SPEC QL NAA+PROBE: NEGATIVE

## 2021-06-18 LAB
FINAL PATHOLOGIC DIAGNOSIS: NORMAL
Lab: NORMAL

## 2021-07-07 NOTE — PROGRESS NOTES
Patient reported to clinic today for weight check.    Last office visit: 4/15/19  - Weight as of last office visit: 183.42    Current weight: 182.32    Patient adhering to diet plan: yes  Patient requesting refill of medication: no  Dr. Ashton notified     Dr. Conrad

## 2021-07-10 ENCOUNTER — PATIENT MESSAGE (OUTPATIENT)
Dept: INTERNAL MEDICINE | Facility: CLINIC | Age: 61
End: 2021-07-10

## 2021-07-10 RX ORDER — HYDROCHLOROTHIAZIDE 25 MG/1
TABLET ORAL
Qty: 30 TABLET | Refills: 3 | Status: SHIPPED | OUTPATIENT
Start: 2021-07-10 | End: 2022-01-19

## 2021-07-30 ENCOUNTER — IMMUNIZATION (OUTPATIENT)
Dept: PHARMACY | Facility: CLINIC | Age: 61
End: 2021-07-30
Payer: COMMERCIAL

## 2021-07-30 DIAGNOSIS — Z23 NEED FOR VACCINATION: Primary | ICD-10-CM

## 2021-09-22 ENCOUNTER — PATIENT MESSAGE (OUTPATIENT)
Dept: INTERNAL MEDICINE | Facility: CLINIC | Age: 61
End: 2021-09-22

## 2021-09-23 ENCOUNTER — PATIENT MESSAGE (OUTPATIENT)
Dept: INTERNAL MEDICINE | Facility: CLINIC | Age: 61
End: 2021-09-23

## 2021-09-23 RX ORDER — IBUPROFEN AND FAMOTIDINE 26.6; 8 MG/1; MG/1
1 TABLET ORAL 3 TIMES DAILY PRN
Qty: 90 TABLET | Refills: 3 | Status: SHIPPED | OUTPATIENT
Start: 2021-09-23 | End: 2021-12-20

## 2021-09-24 RX ORDER — IBUPROFEN 800 MG/1
800 TABLET ORAL 3 TIMES DAILY PRN
Qty: 90 TABLET | Refills: 0 | Status: SHIPPED | OUTPATIENT
Start: 2021-09-24 | End: 2022-06-23 | Stop reason: SDUPTHER

## 2021-09-25 ENCOUNTER — PATIENT MESSAGE (OUTPATIENT)
Dept: INTERNAL MEDICINE | Facility: CLINIC | Age: 61
End: 2021-09-25

## 2021-10-04 ENCOUNTER — PATIENT MESSAGE (OUTPATIENT)
Dept: ADMINISTRATIVE | Facility: HOSPITAL | Age: 61
End: 2021-10-04

## 2021-10-12 RX ORDER — FLUTICASONE PROPIONATE 50 MCG
SPRAY, SUSPENSION (ML) NASAL
Qty: 16 G | Refills: 3 | Status: SHIPPED | OUTPATIENT
Start: 2021-10-12 | End: 2022-06-24

## 2021-10-13 ENCOUNTER — HOSPITAL ENCOUNTER (OUTPATIENT)
Dept: RADIOLOGY | Facility: OTHER | Age: 61
Discharge: HOME | End: 2021-10-13
Attending: OBSTETRICS & GYNECOLOGY
Payer: COMMERCIAL

## 2021-10-13 DIAGNOSIS — Z12.39 ENCOUNTER FOR SCREENING FOR MALIGNANT NEOPLASM OF BREAST, UNSPECIFIED SCREENING MODALITY: ICD-10-CM

## 2021-10-13 DIAGNOSIS — Z01.419 WELL WOMAN EXAM WITH ROUTINE GYNECOLOGICAL EXAM: ICD-10-CM

## 2021-10-13 PROCEDURE — 77067 SCR MAMMO BI INCL CAD: CPT | Mod: 26,,, | Performed by: RADIOLOGY

## 2021-10-13 PROCEDURE — 77067 SCR MAMMO BI INCL CAD: CPT | Mod: TC

## 2021-10-13 PROCEDURE — 77063 BREAST TOMOSYNTHESIS BI: CPT | Mod: 26,,, | Performed by: RADIOLOGY

## 2021-10-13 PROCEDURE — 77063 MAMMO DIGITAL SCREENING BILAT WITH TOMO: ICD-10-PCS | Mod: 26,,, | Performed by: RADIOLOGY

## 2021-10-13 PROCEDURE — 77067 MAMMO DIGITAL SCREENING BILAT WITH TOMO: ICD-10-PCS | Mod: 26,,, | Performed by: RADIOLOGY

## 2021-12-17 ENCOUNTER — OFFICE VISIT (OUTPATIENT)
Dept: INTERNAL MEDICINE | Facility: CLINIC | Age: 61
End: 2021-12-17
Payer: COMMERCIAL

## 2021-12-17 VITALS
SYSTOLIC BLOOD PRESSURE: 130 MMHG | OXYGEN SATURATION: 98 % | DIASTOLIC BLOOD PRESSURE: 78 MMHG | WEIGHT: 192.88 LBS | HEIGHT: 63 IN | BODY MASS INDEX: 34.18 KG/M2 | HEART RATE: 73 BPM | RESPIRATION RATE: 18 BRPM | TEMPERATURE: 98 F

## 2021-12-17 DIAGNOSIS — J01.90 ACUTE SINUSITIS, RECURRENCE NOT SPECIFIED, UNSPECIFIED LOCATION: Primary | ICD-10-CM

## 2021-12-17 PROCEDURE — 99999 PR PBB SHADOW E&M-EST. PATIENT-LVL IV: CPT | Mod: PBBFAC,,, | Performed by: INTERNAL MEDICINE

## 2021-12-17 PROCEDURE — 99213 OFFICE O/P EST LOW 20 MIN: CPT | Mod: S$GLB,,, | Performed by: INTERNAL MEDICINE

## 2021-12-17 PROCEDURE — 99999 PR PBB SHADOW E&M-EST. PATIENT-LVL IV: ICD-10-PCS | Mod: PBBFAC,,, | Performed by: INTERNAL MEDICINE

## 2021-12-17 PROCEDURE — 99213 PR OFFICE/OUTPT VISIT, EST, LEVL III, 20-29 MIN: ICD-10-PCS | Mod: S$GLB,,, | Performed by: INTERNAL MEDICINE

## 2021-12-17 RX ORDER — AZITHROMYCIN 250 MG/1
250 TABLET, FILM COATED ORAL DAILY
Qty: 6 TABLET | Refills: 0 | Status: SHIPPED | OUTPATIENT
Start: 2021-12-17 | End: 2021-12-22

## 2021-12-24 ENCOUNTER — HOSPITAL ENCOUNTER (EMERGENCY)
Facility: OTHER | Age: 61
Discharge: HOME | End: 2021-12-24
Attending: EMERGENCY MEDICINE
Payer: COMMERCIAL

## 2021-12-24 VITALS
SYSTOLIC BLOOD PRESSURE: 178 MMHG | TEMPERATURE: 98 F | RESPIRATION RATE: 16 BRPM | BODY MASS INDEX: 31.92 KG/M2 | WEIGHT: 187 LBS | HEART RATE: 65 BPM | DIASTOLIC BLOOD PRESSURE: 79 MMHG | OXYGEN SATURATION: 99 % | HEIGHT: 64 IN

## 2021-12-24 DIAGNOSIS — U07.1 COVID-19: Primary | ICD-10-CM

## 2021-12-24 DIAGNOSIS — U07.1 COVID-19 VIRUS DETECTED: ICD-10-CM

## 2021-12-24 LAB
CTP QC/QA: YES
SARS-COV-2 RDRP RESP QL NAA+PROBE: POSITIVE

## 2021-12-24 PROCEDURE — U0003 INFECTIOUS AGENT DETECTION BY NUCLEIC ACID (DNA OR RNA); SEVERE ACUTE RESPIRATORY SYNDROME CORONAVIRUS 2 (SARS-COV-2) (CORONAVIRUS DISEASE [COVID-19]), AMPLIFIED PROBE TECHNIQUE, MAKING USE OF HIGH THROUGHPUT TECHNOLOGIES AS DESCRIBED BY CMS-2020-01-R: HCPCS | Performed by: EMERGENCY MEDICINE

## 2021-12-24 PROCEDURE — U0005 INFEC AGEN DETEC AMPLI PROBE: HCPCS | Performed by: EMERGENCY MEDICINE

## 2021-12-24 PROCEDURE — U0002 COVID-19 LAB TEST NON-CDC: HCPCS | Performed by: NURSE PRACTITIONER

## 2021-12-24 PROCEDURE — 99282 EMERGENCY DEPT VISIT SF MDM: CPT | Mod: 25

## 2021-12-24 NOTE — ED PROVIDER NOTES
Encounter Date: 12/24/2021    SCRIBE #1 NOTE: I, Maxine Nicolás, am scribing for, and in the presence of, Rajiv Ortiz MD .       History     Chief Complaint   Patient presents with    COVID-19 Concerns     Pt states she was exposed to 2 people with covid. States she has been having sinus congestion but that is chronic. Denies chest pain or shortness of breath.      This is a 61 y.o. female who presents with complaint of cough for 2-3 days. She reports associated cold, nasal congestion, and sore throat. Denies sneezing. Several of her co-workers tested positive for COVID-19. Reports no identifying, alleviating, or exacerbating factors. Reports no medications.     The history is provided by the patient.     Review of patient's allergies indicates:   Allergen Reactions    No known allergies      Past Medical History:   Diagnosis Date    ALLERGIC RHINITIS     Anemia     Anxiety     Cancer     right neck    Hypertension     Transient elevated blood pressure      Past Surgical History:   Procedure Laterality Date    COLONOSCOPY N/A 12/12/2020    Procedure: COLONOSCOPY;  Surgeon: GHADA Gamez MD;  Location: [address];  Service: Endoscopy;  Laterality: N/A;  Pt reports family Hx colon cancer (Pt's father )  prep ins. emailed - COVID screening on 12/9/20 [facility]    Surgical removal neck cancer Right     TUBAL LIGATION       Family History   Problem Relation Age of Onset    Hypertension Mother     Breast cancer Mother     Colon cancer Neg Hx     Ovarian cancer Neg Hx      Social History     Tobacco Use    Smoking status: Never Smoker    Smokeless tobacco: Never Used   Substance Use Topics    Alcohol use: Yes     Comment: Social    Drug use: No     Review of Systems   Constitutional: Negative for fever.   HENT: Positive for congestion and sore throat. Negative for sneezing.    Respiratory: Positive for cough. Negative for shortness of breath.    Cardiovascular: Negative for chest pain.    Gastrointestinal: Negative for nausea.   Genitourinary: Negative for dysuria.   Musculoskeletal: Negative for back pain.   Skin: Negative for rash.   Neurological: Negative for weakness.   Hematological: Does not bruise/bleed easily.       Physical Exam     Initial Vitals [12/24/21 0722]   BP Pulse Resp Temp SpO2   (!) 144/75 74 16 99 °F (37.2 °C) 100 %      MAP       --         Physical Exam    Nursing note and vitals reviewed.  Constitutional: She appears well-developed and well-nourished. She is not diaphoretic. No distress.   HENT:   Head: Normocephalic and atraumatic.   Mouth/Throat: Oropharynx is clear and moist.   Eyes: Conjunctivae and EOM are normal. Pupils are equal, round, and reactive to light. No scleral icterus.   Neck: Neck supple.   Cardiovascular: Normal rate and regular rhythm.   No murmur heard.  Pulmonary/Chest: Breath sounds normal. No respiratory distress.   Abdominal: There is no abdominal tenderness.   Musculoskeletal:         General: No tenderness or edema. Normal range of motion.      Cervical back: Neck supple.     Lymphadenopathy:     She has no cervical adenopathy.   Neurological: She is alert and oriented to person, place, and time.   Skin: Skin is warm and dry. Capillary refill takes less than 2 seconds.   Psychiatric: She has a normal mood and affect. Thought content normal.         ED Course   Procedures  Labs Reviewed   SARS-COV-2 RDRP GENE - Abnormal; Notable for the following components:       Result Value    POC Rapid COVID Positive (*)     All other components within normal limits   SARS-COV-2 (COVID-19) QUALITATIVE PCR          Imaging Results    None          Medications - No data to display  Medical Decision Making:   History:   Old Medical Records: I decided to obtain old medical records.  Clinical Tests:   Lab Tests: Ordered and Reviewed          Scribe Attestation:   Scribe #1: I performed the above scribed service and the documentation accurately describes the services  I performed. I attest to the accuracy of the note.    Attending Attestation:             Attending ED Notes:   Emergent evaluation of a 61-year-old female with cough, cold, nasal congestion and sore throat.  Patient is afebrile, nontoxic appearing with stable vital signs.  Patient in no acute respiratory distress.  Positive sick contacts at work.  The patient is extensively counseled on their diagnosis and treatment including all diagnostic, laboratory and physical exam findings.  The patient is discharged good condition and directed follow-up with their PCP in the next 24-48 hours.             Physician Attestation for Scribe: I, Rajiv Ortiz, reviewed documentation as scribed in my presence, which is both accurate and complete.  Clinical Impression:   Final diagnoses:  [U07.1] COVID-19 (Primary)          ED Disposition Condition    Discharge Good        ED Prescriptions     None        Follow-up Information     Follow up With Specialties Details Why Contact Info    Rajiv Ortiz MD Emergency Medicine  If symptoms worsen [address] [facility] [address]  [phone]             Rajiv Ortiz MD  12/24/21 0859

## 2021-12-24 NOTE — Clinical Note
"Irene"Aime Lawton was seen and treated in our emergency department on 12/24/2021.     COVID-19 is present in our communities across the state. There is limited testing for COVID at this time, so not all patients can be tested. In this situation, your employee meets the following criteria:    Irene Lawton has met the criteria for COVID-19 testing and has a POSITIVE result. She can return to work once they are asymptomatic for 72 hours without the use of fever reducing medications AND at least ten days from the first positive result.     If you have any questions or concerns, or if I can be of further assistance, please do not hesitate to contact me.    Sincerely,             Wen Lobo RN"

## 2021-12-26 LAB
SARS-COV-2 RNA RESP QL NAA+PROBE: DETECTED
SARS-COV-2- CYCLE NUMBER: 29

## 2022-01-19 ENCOUNTER — OFFICE VISIT (OUTPATIENT)
Dept: INTERNAL MEDICINE | Facility: CLINIC | Age: 62
End: 2022-01-19
Payer: COMMERCIAL

## 2022-01-19 VITALS
OXYGEN SATURATION: 99 % | DIASTOLIC BLOOD PRESSURE: 84 MMHG | BODY MASS INDEX: 33.32 KG/M2 | WEIGHT: 188.06 LBS | SYSTOLIC BLOOD PRESSURE: 120 MMHG | TEMPERATURE: 98 F | RESPIRATION RATE: 18 BRPM | HEIGHT: 63 IN | HEART RATE: 64 BPM

## 2022-01-19 DIAGNOSIS — Z00.00 ROUTINE MEDICAL EXAM: Primary | ICD-10-CM

## 2022-01-19 PROCEDURE — 3044F PR MOST RECENT HEMOGLOBIN A1C LEVEL <7.0%: ICD-10-PCS | Mod: CPTII,S$GLB,, | Performed by: INTERNAL MEDICINE

## 2022-01-19 PROCEDURE — 99396 PR PREVENTIVE VISIT,EST,40-64: ICD-10-PCS | Mod: S$GLB,,, | Performed by: INTERNAL MEDICINE

## 2022-01-19 PROCEDURE — 99396 PREV VISIT EST AGE 40-64: CPT | Mod: S$GLB,,, | Performed by: INTERNAL MEDICINE

## 2022-01-19 PROCEDURE — 3008F PR BODY MASS INDEX (BMI) DOCUMENTED: ICD-10-PCS | Mod: CPTII,S$GLB,, | Performed by: INTERNAL MEDICINE

## 2022-01-19 PROCEDURE — 3008F BODY MASS INDEX DOCD: CPT | Mod: CPTII,S$GLB,, | Performed by: INTERNAL MEDICINE

## 2022-01-19 PROCEDURE — 3079F DIAST BP 80-89 MM HG: CPT | Mod: CPTII,S$GLB,, | Performed by: INTERNAL MEDICINE

## 2022-01-19 PROCEDURE — 99999 PR PBB SHADOW E&M-EST. PATIENT-LVL IV: ICD-10-PCS | Mod: PBBFAC,,, | Performed by: INTERNAL MEDICINE

## 2022-01-19 PROCEDURE — 1159F PR MEDICATION LIST DOCUMENTED IN MEDICAL RECORD: ICD-10-PCS | Mod: CPTII,S$GLB,, | Performed by: INTERNAL MEDICINE

## 2022-01-19 PROCEDURE — 99999 PR PBB SHADOW E&M-EST. PATIENT-LVL IV: CPT | Mod: PBBFAC,,, | Performed by: INTERNAL MEDICINE

## 2022-01-19 PROCEDURE — 3079F PR MOST RECENT DIASTOLIC BLOOD PRESSURE 80-89 MM HG: ICD-10-PCS | Mod: CPTII,S$GLB,, | Performed by: INTERNAL MEDICINE

## 2022-01-19 PROCEDURE — 1160F RVW MEDS BY RX/DR IN RCRD: CPT | Mod: CPTII,S$GLB,, | Performed by: INTERNAL MEDICINE

## 2022-01-19 PROCEDURE — 3044F HG A1C LEVEL LT 7.0%: CPT | Mod: CPTII,S$GLB,, | Performed by: INTERNAL MEDICINE

## 2022-01-19 PROCEDURE — 1159F MED LIST DOCD IN RCRD: CPT | Mod: CPTII,S$GLB,, | Performed by: INTERNAL MEDICINE

## 2022-01-19 PROCEDURE — 1160F PR REVIEW ALL MEDS BY PRESCRIBER/CLIN PHARMACIST DOCUMENTED: ICD-10-PCS | Mod: CPTII,S$GLB,, | Performed by: INTERNAL MEDICINE

## 2022-01-19 PROCEDURE — 3074F SYST BP LT 130 MM HG: CPT | Mod: CPTII,S$GLB,, | Performed by: INTERNAL MEDICINE

## 2022-01-19 PROCEDURE — 3074F PR MOST RECENT SYSTOLIC BLOOD PRESSURE < 130 MM HG: ICD-10-PCS | Mod: CPTII,S$GLB,, | Performed by: INTERNAL MEDICINE

## 2022-01-19 NOTE — PROGRESS NOTES
The patient is a 61 y.o. old female who presents to the office for a physical.    PAST MEDICAL HISTORY  Past Medical History:   Diagnosis Date    ALLERGIC RHINITIS     Anemia     Anxiety     Cancer     right neck    Hypertension     Transient elevated blood pressure        SURGICAL HISTORY:  Past Surgical History:   Procedure Laterality Date    COLONOSCOPY N/A 12/12/2020    Procedure: COLONOSCOPY;  Surgeon: GHADA Gamez MD;  Location: [address]);  Service: Endoscopy;  Laterality: N/A;  Pt reports family Hx colon cancer (Pt's father )  prep ins. emailed - COVID screening on 12/9/20 [facility]    Surgical removal neck cancer Right     TUBAL LIGATION           MEDS:  Medcard reviewed and updated    ALLERGIES: Allergy Card reviewed and updated    SOCIAL HISTORY:   The patient is a nonsmoker, denies alcohol or illicit drug use.    ROS:  GENERAL: No fever, chills, fatigability or weight loss.  SKIN: No rashes.  HEAD: No headaches or recent head trauma.  EYES: No photophobia, ocular pain or diplopia.  EARS: Denies ear pain, discharge or vertigo.  NOSE: No epistaxis.  Positive postnasal drip.  MOUTH & THROAT: No hoarseness or change in voice.   NODES: Denies swollen glands.  CHEST: Denies shortness of breath, wheezing, cough and sputum production.  CARDIOVASCULAR: Denies chest pain or palpitations.  ABDOMEN: Appetite fine. Denies diarrhea, abdominal pain or blood in stool.  Positive constipation.  URINARY: No dysuria or hematuria.  MUSCULOSKELETAL: No joint stiffness or swelling. Denies back pain.  NEUROLOGIC: No history of seizures.  ENDOCRINE: Denies polyuria or polydipsia.  PSYCHIATRIC: Denies mood swings, depression, anxiety, homicidal or suicidal thoughts.    SCREENINGS:  Last cholesterol: 2021  Last colonoscopy: 2020  Last mammogram: 2021  Last Pap smear: 2021  Last tetanus: 2016  Last Pneumovax: none  Last eye exam: 2 weeks ago  Last bone density: none  Last menstrual period:  postmenopausal    PE:   Vitals:  Vitals:    01/19/22 1550   BP: 120/84   Pulse: 64   Resp: 18   Temp: 97.9 °F (36.6 °C)       APPEARANCE: Well nourished, well developed, in no acute distress.    EYES: Sclerae anicteric. PERRL. EOMI.      EARS: TM's intact. No retraction or perforation.    NOSE: Mucosa pink. Airway clear.  MOUTH & THROAT: No tonsillar enlargement. No pharyngeal erythema or exudate. No stridor.  NECK: Supple, no thyromegaly.  CHEST: Lungs clear to auscultation with unlabored respirations.  CARDIOVASCULAR: Normal S1, S2. No murmurs. No carotid bruits. No pedal edema.  ABDOMEN: Bowel sounds normal. Not distended. Soft. No tenderness or masses.   MUSCULOSKELETAL:  Normal gait, no cyanosis or clubbing.   SKIN: Normal skin turgor, warm and dry.  NEUROLOGIC: Cranial Nerves: Intact.  PSYCHIATRIC: The patient is oriented to person, place, and time and has a pleasant affect.        ASSESSMENT/PLAN:  Irene was seen today for annual exam.    Diagnoses and all orders for this visit:    Routine medical exam  -     CBC Auto Differential; Future  -     Comprehensive Metabolic Panel; Future  -     Lipid Panel; Future  -     TSH; Future  -     Hemoglobin A1C; Future  -     Urinalysis; Future

## 2022-01-21 ENCOUNTER — LAB VISIT (OUTPATIENT)
Dept: LAB | Facility: HOSPITAL | Age: 62
End: 2022-01-21
Attending: INTERNAL MEDICINE
Payer: COMMERCIAL

## 2022-01-21 DIAGNOSIS — Z00.00 ROUTINE MEDICAL EXAM: ICD-10-CM

## 2022-01-21 LAB
BILIRUB UR QL STRIP: NEGATIVE
CLARITY UR REFRACT.AUTO: CLEAR
COLOR UR AUTO: YELLOW
GLUCOSE UR QL STRIP: NEGATIVE
HGB UR QL STRIP: NEGATIVE
KETONES UR QL STRIP: NEGATIVE
LEUKOCYTE ESTERASE UR QL STRIP: NEGATIVE
NITRITE UR QL STRIP: NEGATIVE
PH UR STRIP: 7 [PH] (ref 5–8)
PROT UR QL STRIP: NEGATIVE
SP GR UR STRIP: 1.01 (ref 1–1.03)
URN SPEC COLLECT METH UR: NORMAL

## 2022-01-21 PROCEDURE — 81003 URINALYSIS AUTO W/O SCOPE: CPT | Performed by: INTERNAL MEDICINE

## 2022-04-29 ENCOUNTER — OFFICE VISIT (OUTPATIENT)
Dept: OBSTETRICS AND GYNECOLOGY | Facility: CLINIC | Age: 62
End: 2022-04-29
Payer: COMMERCIAL

## 2022-04-29 VITALS
WEIGHT: 189.06 LBS | BODY MASS INDEX: 33.5 KG/M2 | HEIGHT: 63 IN | DIASTOLIC BLOOD PRESSURE: 84 MMHG | SYSTOLIC BLOOD PRESSURE: 140 MMHG

## 2022-04-29 DIAGNOSIS — N81.9 FEMALE GENITAL PROLAPSE, UNSPECIFIED TYPE: Primary | ICD-10-CM

## 2022-04-29 DIAGNOSIS — N81.4 CYSTOCELE WITH PROLAPSE: ICD-10-CM

## 2022-04-29 PROCEDURE — 3008F PR BODY MASS INDEX (BMI) DOCUMENTED: ICD-10-PCS | Mod: CPTII,S$GLB,, | Performed by: OBSTETRICS & GYNECOLOGY

## 2022-04-29 PROCEDURE — 3077F SYST BP >= 140 MM HG: CPT | Mod: CPTII,S$GLB,, | Performed by: OBSTETRICS & GYNECOLOGY

## 2022-04-29 PROCEDURE — 99999 PR PBB SHADOW E&M-EST. PATIENT-LVL III: CPT | Mod: PBBFAC,,, | Performed by: OBSTETRICS & GYNECOLOGY

## 2022-04-29 PROCEDURE — 3079F PR MOST RECENT DIASTOLIC BLOOD PRESSURE 80-89 MM HG: ICD-10-PCS | Mod: CPTII,S$GLB,, | Performed by: OBSTETRICS & GYNECOLOGY

## 2022-04-29 PROCEDURE — 99213 PR OFFICE/OUTPT VISIT, EST, LEVL III, 20-29 MIN: ICD-10-PCS | Mod: S$GLB,,, | Performed by: OBSTETRICS & GYNECOLOGY

## 2022-04-29 PROCEDURE — 99213 OFFICE O/P EST LOW 20 MIN: CPT | Mod: S$GLB,,, | Performed by: OBSTETRICS & GYNECOLOGY

## 2022-04-29 PROCEDURE — 1160F PR REVIEW ALL MEDS BY PRESCRIBER/CLIN PHARMACIST DOCUMENTED: ICD-10-PCS | Mod: CPTII,S$GLB,, | Performed by: OBSTETRICS & GYNECOLOGY

## 2022-04-29 PROCEDURE — 1159F MED LIST DOCD IN RCRD: CPT | Mod: CPTII,S$GLB,, | Performed by: OBSTETRICS & GYNECOLOGY

## 2022-04-29 PROCEDURE — 3077F PR MOST RECENT SYSTOLIC BLOOD PRESSURE >= 140 MM HG: ICD-10-PCS | Mod: CPTII,S$GLB,, | Performed by: OBSTETRICS & GYNECOLOGY

## 2022-04-29 PROCEDURE — 99999 PR PBB SHADOW E&M-EST. PATIENT-LVL III: ICD-10-PCS | Mod: PBBFAC,,, | Performed by: OBSTETRICS & GYNECOLOGY

## 2022-04-29 PROCEDURE — 3044F PR MOST RECENT HEMOGLOBIN A1C LEVEL <7.0%: ICD-10-PCS | Mod: CPTII,S$GLB,, | Performed by: OBSTETRICS & GYNECOLOGY

## 2022-04-29 PROCEDURE — 3079F DIAST BP 80-89 MM HG: CPT | Mod: CPTII,S$GLB,, | Performed by: OBSTETRICS & GYNECOLOGY

## 2022-04-29 PROCEDURE — 3008F BODY MASS INDEX DOCD: CPT | Mod: CPTII,S$GLB,, | Performed by: OBSTETRICS & GYNECOLOGY

## 2022-04-29 PROCEDURE — 1159F PR MEDICATION LIST DOCUMENTED IN MEDICAL RECORD: ICD-10-PCS | Mod: CPTII,S$GLB,, | Performed by: OBSTETRICS & GYNECOLOGY

## 2022-04-29 PROCEDURE — 3044F HG A1C LEVEL LT 7.0%: CPT | Mod: CPTII,S$GLB,, | Performed by: OBSTETRICS & GYNECOLOGY

## 2022-04-29 PROCEDURE — 1160F RVW MEDS BY RX/DR IN RCRD: CPT | Mod: CPTII,S$GLB,, | Performed by: OBSTETRICS & GYNECOLOGY

## 2022-04-29 NOTE — PATIENT INSTRUCTIONS
--Empty bladder every 3 hours.  Empty well: wait a minute, lean forward on toilet.    --Avoid dietary irritants (see sheet).  Keep diary x 3-5 days to determine your irritants.  --KEGELS: do 10 in AM and 10 in PM, holding each x 10 seconds.  When you feel urge to go, STOP, KEGEL, and when urge has passed, then go to bathroom.  Consider PT in future.  Consider LEVA system.  --URGE: consider anticholinergic.  Takes 2-4 weeks to see if will have effect.  For dry mouth: get sour, sugar free lozenge or gum.    --STRESS:  Pessary vs. Sling.     Bladder Irritants    Alcoholic beverages  Apples and apple juice  Cantaloupe  Carbonated beverages  Chili and spicy foods  Chocolate  Citrus fruit  Coffee (including decaffeinated)  Cranberries and cranberry juice  Grapes  Guava  Milk Products: milk, cheese, cottage cheese, yogurt, ice cream  Peaches  Pineapple  Plums  Strawberries  Sugar especially artificial sweeteners, saccharin, aspartame, corn sweeteners, honey, fructose, sucrose, lactose  Tea  Tomatoes and tomato juice  Vitamin B complex  Vinegar  *Most people are not sensitive to ALL of these products; your goal is to find the foods that make YOUR  symptoms worse     Certain foods and drinks have been associated with worsening symptoms of urinary frequency, urgency, urge incontinence, or  bladder pain. If you suffer from any of these conditions, you may wish to try eliminating one or more of these foods from your  diet and see if your symptoms improve. If bladder symptoms are related to dietary factors, strict adherence to a diet that  eliminates the food should bring marked relief in 10 days. Once you are feeling better, you can begin to add foods back into  your diet, one at a time. If symptoms return, you will be able to identify the irritant. As you add foods back to your diet it is  very important that you drink significant amounts of water.  Low-acid fruit substitutions include apricots, papaya, pears and watermelon.  Coffee drinkers can drink Kava or other lowacid instant drinks. Tea drinkers can substitute non-citrus herbal and sun brewed teas. Calcium carbonate co-buffered with  calcium ascorbate can be substituted for Vitamin C. Prelief is a dietary supplement that works as an acid blocker for the  bladder.

## 2022-04-29 NOTE — PROGRESS NOTES
Subjective:       Patient ID: Irene Lawton is a 61 y.o. female.    Chief Complaint:  Vaginal Prolapse      History of Present Illness  Gynecologic Exam  The patient's pertinent negatives include no genital itching, genital lesions, genital odor, genital rash, missed menses, pelvic pain, vaginal bleeding or vaginal discharge. This is a new problem. The current episode started in the past 7 days. The patient is experiencing no pain. She is not pregnant. Associated symptoms include constipation. Pertinent negatives include no abdominal pain, anorexia, back pain, chills, diarrhea, discolored urine, dysuria, fever, flank pain, frequency, headaches, hematuria, nausea, painful intercourse, rash, urgency or vomiting. There has been no bleeding. She has not been passing clots. She has not been passing tissue. She is not sexually active. No, her partner does not have an STD. She is postmenopausal. There is no history of an abdominal surgery, a  section, an ectopic pregnancy, endometriosis, a gynecological surgery, herpes simplex, menorrhagia, metrorrhagia, miscarriage, ovarian cysts, perineal abscess, PID, an STD, a terminated pregnancy or vaginosis.     62 y/o  presents for evaluation of possible pelvic organ prolapse. Reports that she noticed a vaginal bulge when she wiped. No pressure or pain. Noticed some urinary incontinence recently, more urgency and then right when she gets to the restroom has leaking. No leaking with laugh/cough/sneeze.    GYN & OB History  Patient's last menstrual period was 2012.   Date of Last Pap: 2021    OB History    Para Term  AB Living   2 2 2     2   SAB IAB Ectopic Multiple Live Births           2      # Outcome Date GA Lbr Donavan/2nd Weight Sex Delivery Anes PTL Lv   2 Term      Vag-Spont  N KAROLINA   1 Term      Vag-Spont  N KAROLINA       Review of Systems  Review of Systems   Constitutional: Negative for chills, diaphoresis, fatigue and fever.    Respiratory: Negative for cough and shortness of breath.    Cardiovascular: Negative for chest pain and palpitations.   Gastrointestinal: Positive for constipation. Negative for abdominal pain, anorexia, diarrhea, nausea and vomiting.   Genitourinary: Positive for bladder incontinence. Negative for dyspareunia, dysuria, flank pain, frequency, hematuria, menorrhagia, missed menses, pelvic pain, urgency, vaginal bleeding, vaginal discharge and vaginal pain.   Musculoskeletal: Negative for back pain.   Integumentary:  Negative for rash.   Neurological: Negative for headaches.   Psychiatric/Behavioral: Negative for depression. The patient is not nervous/anxious.            Objective:    Physical Exam:   Constitutional: She is oriented to person, place, and time. She appears well-developed and well-nourished. No distress.    HENT:   Head: Normocephalic and atraumatic.    Eyes: EOM are normal.      Pulmonary/Chest: Effort normal.          Genitourinary:    Genitourinary Comments: Normal external female genitalia; vagina noting stage 2 cystocele, stage 1 apical prolapse, no rectocele; cervix normal, no masses;             Musculoskeletal: Normal range of motion and moves all extremeties.       Neurological: She is alert and oriented to person, place, and time.    Skin: No rash noted.    Psychiatric: She has a normal mood and affect. Her behavior is normal. Judgment and thought content normal.          Assessment:        1. Female genital prolapse, unspecified type    2. Cystocele with prolapse             Plan:      Irene was seen today for vaginal prolapse.    Diagnoses and all orders for this visit:    Female genital prolapse, unspecified type    Cystocele with prolapse    Reviewed anatomy of cystocele with patient  Discussed treatment options including traditional pelvic floor PT, LEVA system, pessary, surgery. She is interested in a trial of LEVA system.   Discussed that sometimes improvement of POP can lead to  urinary incontinence.   Consider referral to Urogyn in the future.  Discussed treatments for UI:    --Empty bladder every 3 hours.  Empty well: wait a minute, lean forward on toilet.    --Avoid dietary irritants (see sheet).  Keep diary x 3-5 days to determine your irritants.  --KEGELS: do 10 in AM and 10 in PM, holding each x 10 seconds.  When you feel urge to go, STOP, KEGEL, and when urge has passed, then go to bathroom.  Consider PT in future.    --URGE: consider anticholinergic. Takes 2-4 weeks to see if will have effect.  For dry mouth: get sour, sugar free lozenge or gum.    --STRESS:  Pessary vs. Sling.     20 minutes of total time spent on the encounter, which includes face to face time and non-face to face time preparing to see the patient (eg, review of tests), Obtaining and/or reviewing separately obtained history, Documenting clinical information in the electronic or other health record, Independently interpreting results (not separately reported) and communicating results to the patient/family/caregiver, or Care coordination (not separately reported).        No orders of the defined types were placed in this encounter.      Follow up if symptoms worsen or fail to improve.

## 2022-05-05 ENCOUNTER — TELEPHONE (OUTPATIENT)
Dept: INTERNAL MEDICINE | Facility: CLINIC | Age: 62
End: 2022-05-05
Payer: COMMERCIAL

## 2022-05-05 NOTE — TELEPHONE ENCOUNTER
Spoke to pt and she states she was told by her GYN her uterus is falling, and was given exercises to do. Pt doesn't not think this is the correct route and is requesting a phone call to have more medical advise and opinion.  LOV:01/19/2022  RTC:not on file

## 2022-05-05 NOTE — TELEPHONE ENCOUNTER
----- Message from Rose Gastelum sent at 5/5/2022 11:11 AM CDT -----  Contact: Pt [phone]  Pt would like a call back she stated that it was a personal matter.     Please call

## 2022-05-06 NOTE — TELEPHONE ENCOUNTER
Returned patient's call.  Advised her that she should proceed with a course of therapy that she is most comfortable with.  Patient verbalized understanding.

## 2022-05-12 ENCOUNTER — TELEPHONE (OUTPATIENT)
Dept: OBSTETRICS AND GYNECOLOGY | Facility: CLINIC | Age: 62
End: 2022-05-12
Payer: COMMERCIAL

## 2022-05-12 NOTE — TELEPHONE ENCOUNTER
----- Message from Sharon Velasquez sent at 5/12/2022  9:34 AM CDT -----  Regarding: letter concerns  Name of Who is Calling: Irene           What is the request in detail: Patient is requesting a call back in regards to the letter that was filled out for a device. The company never received it.            Can the clinic reply by [facility]: No           What Number to Call Back if not in [facility]: [phone]    Returned pt call pt is calling because she signed up to receive the Leva device but never heard from the company. She called and they stated they never received prescription. I informed pt that I faxed prescription twice to the fax number on the prescription. I told pt I would call and reach out to the company. Pt verbalized understanding

## 2022-05-17 DIAGNOSIS — R51.9 HEADACHE AROUND THE EYES: Primary | ICD-10-CM

## 2022-05-20 ENCOUNTER — CLINICAL SUPPORT (OUTPATIENT)
Dept: INTERNAL MEDICINE | Facility: CLINIC | Age: 62
End: 2022-05-20
Payer: COMMERCIAL

## 2022-05-20 DIAGNOSIS — R51.9 HEADACHE AROUND THE EYES: ICD-10-CM

## 2022-05-20 LAB — SARS-COV-2 RNA RESP QL NAA+PROBE: NOT DETECTED

## 2022-05-20 PROCEDURE — U0003 INFECTIOUS AGENT DETECTION BY NUCLEIC ACID (DNA OR RNA); SEVERE ACUTE RESPIRATORY SYNDROME CORONAVIRUS 2 (SARS-COV-2) (CORONAVIRUS DISEASE [COVID-19]), AMPLIFIED PROBE TECHNIQUE, MAKING USE OF HIGH THROUGHPUT TECHNOLOGIES AS DESCRIBED BY CMS-2020-01-R: HCPCS | Performed by: FAMILY MEDICINE

## 2022-05-20 PROCEDURE — U0005 INFEC AGEN DETEC AMPLI PROBE: HCPCS | Performed by: FAMILY MEDICINE

## 2022-06-09 ENCOUNTER — CLINICAL SUPPORT (OUTPATIENT)
Dept: OTHER | Facility: CLINIC | Age: 62
End: 2022-06-09
Payer: COMMERCIAL

## 2022-06-09 DIAGNOSIS — Z00.8 ENCOUNTER FOR OTHER GENERAL EXAMINATION: ICD-10-CM

## 2022-06-10 VITALS
HEIGHT: 63 IN | WEIGHT: 191 LBS | DIASTOLIC BLOOD PRESSURE: 82 MMHG | SYSTOLIC BLOOD PRESSURE: 140 MMHG | BODY MASS INDEX: 33.84 KG/M2

## 2022-06-10 LAB
GLUCOSE SERPL-MCNC: 98 MG/DL (ref 60–140)
HDLC SERPL-MCNC: 84 MG/DL
POC CHOLESTEROL, LDL (DOCK): 135.79 MG/DL
POC CHOLESTEROL, TOTAL: 230 MG/DL
TRIGL SERPL-MCNC: 60 MG/DL

## 2022-06-20 ENCOUNTER — PATIENT MESSAGE (OUTPATIENT)
Dept: UROGYNECOLOGY | Facility: CLINIC | Age: 62
End: 2022-06-20
Payer: COMMERCIAL

## 2022-06-23 ENCOUNTER — OFFICE VISIT (OUTPATIENT)
Dept: UROGYNECOLOGY | Facility: CLINIC | Age: 62
End: 2022-06-23
Payer: COMMERCIAL

## 2022-06-23 VITALS — BODY MASS INDEX: 33.84 KG/M2 | WEIGHT: 191 LBS | HEIGHT: 63 IN

## 2022-06-23 DIAGNOSIS — R39.15 URINARY URGENCY: ICD-10-CM

## 2022-06-23 DIAGNOSIS — N81.10 PROLAPSE OF ANTERIOR VAGINAL WALL: ICD-10-CM

## 2022-06-23 DIAGNOSIS — N81.2 UTEROVAGINAL PROLAPSE, INCOMPLETE: Primary | ICD-10-CM

## 2022-06-23 PROCEDURE — 99215 OFFICE O/P EST HI 40 MIN: CPT | Mod: S$GLB,,, | Performed by: OBSTETRICS & GYNECOLOGY

## 2022-06-23 PROCEDURE — 3008F PR BODY MASS INDEX (BMI) DOCUMENTED: ICD-10-PCS | Mod: CPTII,S$GLB,, | Performed by: OBSTETRICS & GYNECOLOGY

## 2022-06-23 PROCEDURE — 3044F HG A1C LEVEL LT 7.0%: CPT | Mod: CPTII,S$GLB,, | Performed by: OBSTETRICS & GYNECOLOGY

## 2022-06-23 PROCEDURE — 3008F BODY MASS INDEX DOCD: CPT | Mod: CPTII,S$GLB,, | Performed by: OBSTETRICS & GYNECOLOGY

## 2022-06-23 PROCEDURE — 1159F PR MEDICATION LIST DOCUMENTED IN MEDICAL RECORD: ICD-10-PCS | Mod: CPTII,S$GLB,, | Performed by: OBSTETRICS & GYNECOLOGY

## 2022-06-23 PROCEDURE — 87086 URINE CULTURE/COLONY COUNT: CPT | Performed by: OBSTETRICS & GYNECOLOGY

## 2022-06-23 PROCEDURE — 99999 PR PBB SHADOW E&M-EST. PATIENT-LVL IV: CPT | Mod: PBBFAC,,, | Performed by: OBSTETRICS & GYNECOLOGY

## 2022-06-23 PROCEDURE — 3044F PR MOST RECENT HEMOGLOBIN A1C LEVEL <7.0%: ICD-10-PCS | Mod: CPTII,S$GLB,, | Performed by: OBSTETRICS & GYNECOLOGY

## 2022-06-23 PROCEDURE — 99999 PR PBB SHADOW E&M-EST. PATIENT-LVL IV: ICD-10-PCS | Mod: PBBFAC,,, | Performed by: OBSTETRICS & GYNECOLOGY

## 2022-06-23 PROCEDURE — 1159F MED LIST DOCD IN RCRD: CPT | Mod: CPTII,S$GLB,, | Performed by: OBSTETRICS & GYNECOLOGY

## 2022-06-23 PROCEDURE — 99215 PR OFFICE/OUTPT VISIT, EST, LEVL V, 40-54 MIN: ICD-10-PCS | Mod: S$GLB,,, | Performed by: OBSTETRICS & GYNECOLOGY

## 2022-06-23 RX ORDER — SOLIFENACIN SUCCINATE 5 MG/1
5 TABLET, FILM COATED ORAL DAILY
Qty: 30 TABLET | Refills: 11 | Status: SHIPPED | OUTPATIENT
Start: 2022-06-23 | End: 2023-03-14

## 2022-06-23 RX ORDER — CONJUGATED ESTROGENS 0.62 MG/G
CREAM VAGINAL
Qty: 45 G | Refills: 12 | Status: SHIPPED | OUTPATIENT
Start: 2022-06-23 | End: 2023-05-04

## 2022-06-23 NOTE — PROGRESS NOTES
Subjective:       Patient ID: Irene Lawton is a 62 y.o. female.    Chief Complaint:  Urinary Frequency    History of Present Illness  HPI 62 Y O F  has a past medical history of ALLERGIC RHINITIS, Anemia, Anxiety, Cancer, Hypertension, and Transient elevated blood pressure.  Referred by Dr. Laguerre  for evaluation of urinary frequency and urgency.        [facility]    Question 6/22/2022 10:59 AM CDT - Filed by Patient   General Urogynecology: Are you experiencing the following?    Dysuria (painful urination) No   Nocturia:  waking up at night to empty your bladder  Yes   If you answered yes to the previous question, how many times does this happen per night? 1-2   Enuresis (urine loss during sleep) No   Dribbling urine after you urinate No   Hematuria (urine appears red) No   Type of stream Sprays   Urinary frequency: How often a day are you going to the bathroom per day?  Less than 10   Urinary Tract Infections: How many Urinary Tract Infections have you had in the past year? I have not had a UTI in the past year   If you have had a UTI in the past year, what treatments have you had so far?  I have not had a UTI in the past year   Urinary Incontinence (General): Are you experiencing the following?    Past consultation for incontinence: Have you ever seen someone for the evaluation of incontinence? No   If you answered yes to the previous question, please select all the therapies you have tried.  None of the above   Please note the effectiveness of the therapies. Ineffective   Need to wear protection to keep clothes dry  Yes   If you answered yes to the previous question, please shabana the protection you use.  Panty liner   If you wear protection, how much wetness is typically on each pad? Light   If you wear protection, how often do you have to change per day, if applicable?  1   Stress Symptoms: Are you experiencing the following?    Leakage of urine with cough, laugh and/or sneeze No   If you answered  "yes to the previous question, what is the frequency in days, weeks and/or months? Never   Leakage of urine with sex No   Leakage of urine with bending/ lifting No   Leakage of urine with briskly walking or jogging No   If you lose urine for any other reason not previously mentioned, please note it below, if applicable.     Urge Symptoms: Are you experiencing the following?    Urgency ("got to go" feeling) No   Urge: How frequently do you feel an urge to urinate (feeling like you "gotta go" to the bathroom and can't wait) Never   Do you experience a leakage of urine when you have a feeling of urgency?  Yes   Leakage of urine when unaware No   Past use of anticholinergics (medications used to treat overactive bladder) No   If you answered yes to the previous question, please shabana the anticholinergics you have used:     Have you ever used Mirbetriq (aka Mirabegron)?  No   Prolapse Symptoms: Are you experiencing any of the following?     Falling out/ Bulging/ Heaviness in the vagina Yes   Vaginal/ Abdominal Pain/ Pressure No   Need to strain/ Push to void No   Need to wait on the toilet before you void No   Unusual position to urinate (using your hands to push back the vaginal bulge) No   Sensation of incomplete emptying Yes   Past use of pessary device No   If you answered yes to the previous question, please list the devices you have used below.     Bowel Symptoms: Are you experiencing any of the following?    Constipation Yes   Diarrhea  No   Hematochezia (bloody stool) No   Incomplete evacuation of stool Yes   Involuntary loss of formed stool No   Fecal smearing/urgency No   Involuntary loss of gas Yes   Vaginal Symptoms: Are you experiencing any of the following?     Abnormal vaginal bleeding  No   Vaginal dryness No   Sexually active  No   Dyspareunia (painful intercourse) No   Estrogen use  No       GYN & OB History  Patient's last menstrual period was 06/27/2012.   Date of Last Pap: 6/18/2021    OB History "    Para Term  AB Living   2 2 2     2   SAB IAB Ectopic Multiple Live Births           2      # Outcome Date GA Lbr Donavan/2nd Weight Sex Delivery Anes PTL Lv   2 Term      Vag-Spont  N KAROILNA   1 Term      Vag-Spont  N KAROLINA     OB     x 2.  C/s x 0.    Largest: 7 # 8 oz   Forceps: yes  Episiotomy:  yes  Degree: 3rd or 4th yes ( 3rd degree )     GYN  Menarche: 15  Menstrual cycle: n/a  Menopause: 52  Hysterectomy: no  Ovaries:present  Tubal ligation: Yes   Other abdominal surgeries:     Review of Systems  Review of Systems   Constitutional: Negative for chills and fever.   HENT: Negative for sore throat.    Gastrointestinal: Positive for constipation. Negative for abdominal pain, diarrhea, nausea and vomiting.   Genitourinary: Positive for frequency. Negative for dysuria, flank pain, hematuria, pelvic pain, urgency and vaginal discharge.   Musculoskeletal: Negative for back pain.   Skin: Negative for rash.   Neurological: Negative for headaches.           Objective:     Physical Exam   Constitutional: She is oriented to person, place, and time. She appears well-developed.   HENT:   Head: Normocephalic and atraumatic.   Eyes: Conjunctivae and EOM are normal.   Cardiovascular: Normal rate, regular rhythm, S1 normal, S2 normal, normal heart sounds and intact distal pulses.   Pulmonary/Chest: Effort normal and breath sounds normal. She exhibits no tenderness.   Abdominal: Soft. Bowel sounds are normal. She exhibits no distension and no mass. There is no splenomegaly or hepatomegaly. There is no abdominal tenderness. There is no rigidity, no rebound and no guarding. No hernia.   Genitourinary: Pelvic exam was performed with patient supine. Rectum normal, vagina normal, uterus normal, cervix normal, skenes normal and bartholins normal. Right labia normal and left labia normal. Urethra exhibits hypermobility. Urethra exhibits no urethral caruncle, no urethral diverticulum and no urethral mass. Right bartholin is  not enlarged and not tender. Left bartholin is not enlarged and not tender. Rectal exam shows resting tone normal and active tone normal. Rectal exam shows no external hemorrhoid, no fissure, no tenderness, anal tone normal and no dovetailing. Guaiac negative stool. There is a rectocele, a cystocele and atrophy in the vagina. No foreign body, tenderness, bleeding, unspecified prolapse of vaginal walls, fistula, mesh exposure or lavator tenderness in the vagina. Right adnexum displays no mass and no tenderness. Left adnexum displays no mass and no tenderness. Cervix exhibits no motion tenderness and no discharge. Uterus is experiencing uterine prolapse. Uterus is not tender.   PVR: 80 ML  Empty cough stress test: Negative.  Kegel: 3/5    POP-Q  Aa: 1 Ba: 1 C: -3   GH: 4 PB: 2 TVL: 9   Ap: -1 Bp: -1 D: -4                     Musculoskeletal:         General: Normal range of motion.      Cervical back: Normal range of motion and neck supple.   Neurological: She is alert and oriented to person, place, and time. She has normal strength and normal reflexes. Cranial nerves II through XII intact. No cranial nerve deficit.   Skin: Skin is warm and dry.   Psychiatric: She has a normal mood and affect. Her speech is normal and behavior is normal. Judgment normal.         HCM  Pap's: Normal  last Pap: 2021  High Risk HPV: Negative  Mammo: BIRADS:1 ; Last imaging 2021  Colonoscopy: The perianal and digital rectal examinations were normal.        A 5 mm polyp was found in the descending colon. The polyp was        semi-sessile. The polyp was removed with a cold snare -due 2026  Dexa: n/a        Assessment:        1. Uterovaginal prolapse, incomplete    2. Urinary urgency    3. Prolapse of anterior vaginal wall             Plan:        1. Urge incontinence, rare  --Bladder diary for 3-7 days, write the number of times you go to the rest room and associated symptoms of urgency or leakage.   --Empty bladder every 3 hours.  Empty  well: wait a minute, lean forward on toilet.    --Avoid dietary irritants (see sheet).  Keep diary x 3-5 days to determine your irritants.  --KEGELS: do 10 in AM and 10 in PM, holding each x 10 seconds.  When you feel urge to go, STOP, KEGEL, and when urge has passed, then go to bathroom.   PT in future.    --URGE: vesicare 5 mg nightly.   SE profile reviewed.  Takes 2-4 weeks to see if will have effect.  For dry mouth: get sour, sugar free lozenge or gum.      2. Prolapse: Stage 2 apical prolapse, Stage 2 anterior and posterior vaginal wall prolapse   --Pessary benefit discussed with patient, will schedule for pessary fitting   --Daily pelvic floor exercises as assigned,  Pelvic floor PT   --Non surgical options discussed with patient    --Surgical options discussed such as : Hysterectomy (vaginal or laparoscopic)  with apical suspension: SSF, USLS and SCP with mesh augmentation. We also discussed the pro's and con's of hysteropexy.  Risks/ benefits/ alternatives/ succuss and failure rates were reviewed. Information packets were given detailing surgical options.  She was also given web site information for: www.augs.org and www.W[employer] 9sforpfd.org and http://www.fda.gov/MedicalDevices/UroGynSurgicalMesh/default.htm to review the details of the surgical options discussed and the use of mesh in surgery. She will review the information given and we will discuss further at the follow up visit. She would like to avoid surgery therefore will continue with pessary fitting and pelvic floor PT.     3. Constipation:  Controlling constipation may help bladder urgency/leakage and fiber may better control cholesterol and blood glucose.  Start daily fiber (fibercon).  Take 1 tsp of fiber powder (psyllium or other sugar-free powder).  Mix in 8 oz of water.  Take x 3-5 days.  Then, increase fiber by 1 tsp every 3-5 days until stool is easy to pass.  Stop and continue at that dose.   Do not exceed 6 tsps/day.  May also use over the  counter stool softener 1-2 x/day.  AVOID laxatives.    4. Vaginal atrophy (dryness):  Use 1 gram of estrogen cream in vagina nightly x 2 weeks, then twice a week thereafter.        Approximately 55 minutes of total time spent in consult, 75 % in discussion. This includes face to face time and non-face to face time preparing to see the patient, obtaining and/or reviewing separately obtained history, documenting clinical information in the electronic or other health record, independently interpreting results (not separately reported) and communicating results to the patient/family/caregiver, or care coordination (not separately reported).        Thank you for requesting consultation of your patient.  I look forward to participating in her care.    Lulu Parham DO  Female Pelvic Medicine and Reconstructive Surgery  [facility] [place], [place]

## 2022-06-23 NOTE — PATIENT INSTRUCTIONS
1. Urge incontinence, rare  --Bladder diary for 3-7 days, write the number of times you go to the rest room and associated symptoms of urgency or leakage.   --Empty bladder every 3 hours.  Empty well: wait a minute, lean forward on toilet.    --Avoid dietary irritants (see sheet).  Keep diary x 3-5 days to determine your irritants.  --KEGELS: do 10 in AM and 10 in PM, holding each x 10 seconds.  When you feel urge to go, STOP, KEGEL, and when urge has passed, then go to bathroom.   PT in future.    --URGE: vesicare 5 mg nightly.   SE profile reviewed.  Takes 2-4 weeks to see if will have effect.  For dry mouth: get sour, sugar free lozenge or gum.      2. Prolapse: Stage 2 apical prolapse, Stage 2 anterior and posterior vaginal wall prolapse   --Pessary benefit discussed with patient, will schedule for pessary fitting   --Daily pelvic floor exercises as assigned,  Pelvic floor PT   --Non surgical options discussed with patient    --Surgical options discussed such as : Hysterectomy ( vaginal or laparoscopic)  with apical suspension: SSF, USLS and SCP with mesh augmentation. We also discussed the pro's and con's of hysteropexy.  Risks/ benefits/ alternatives/ succuss and failure rates were reviewed. Information packets were given detailing surgical options.  She was also given web site information for: www.augs.org and www.Lenetsforpfd.org and http://www.fda.gov/MedicalDevices/UroGynSurgicalMesh/default.htm to review the details of the surgical options discussed and the use of mesh in surgery. She will review the information given and we will discuss further at the follow up visit.     3. Constipation:  Controlling constipation may help bladder urgency/leakage and fiber may better control cholesterol and blood glucose.  Start daily fiber (fibercon).  Take 1 tsp of fiber powder (psyllium or other sugar-free powder).  Mix in 8 oz of water.  Take x 3-5 days.  Then, increase fiber by 1 tsp every 3-5 days until stool is  easy to pass.  Stop and continue at that dose.   Do not exceed 6 tsps/day.  May also use over the counter stool softener 1-2 x/day.  AVOID laxatives.    4. Vaginal atrophy (dryness):  Use 1 gram of estrogen cream in vagina nightly x 2 weeks, then twice a week thereafter.

## 2022-06-24 LAB — BACTERIA UR CULT: NO GROWTH

## 2022-06-24 RX ORDER — FLUTICASONE PROPIONATE 50 MCG
SPRAY, SUSPENSION (ML) NASAL
Qty: 16 G | Refills: 0 | Status: SHIPPED | OUTPATIENT
Start: 2022-06-24 | End: 2022-09-16

## 2022-06-27 ENCOUNTER — PATIENT MESSAGE (OUTPATIENT)
Dept: UROGYNECOLOGY | Facility: CLINIC | Age: 62
End: 2022-06-27
Payer: COMMERCIAL

## 2022-07-15 ENCOUNTER — CLINICAL SUPPORT (OUTPATIENT)
Dept: REHABILITATION | Facility: OTHER | Age: 62
End: 2022-07-15
Attending: OBSTETRICS & GYNECOLOGY
Payer: COMMERCIAL

## 2022-07-15 DIAGNOSIS — R39.15 URINARY URGENCY: ICD-10-CM

## 2022-07-15 DIAGNOSIS — M62.89 PELVIC FLOOR DYSFUNCTION: Primary | ICD-10-CM

## 2022-07-15 DIAGNOSIS — N81.2 UTEROVAGINAL PROLAPSE, INCOMPLETE: ICD-10-CM

## 2022-07-15 DIAGNOSIS — R27.8 COORDINATION IMPAIRMENT: ICD-10-CM

## 2022-07-15 PROCEDURE — 97112 NEUROMUSCULAR REEDUCATION: CPT | Mod: PN

## 2022-07-15 PROCEDURE — 97162 PT EVAL MOD COMPLEX 30 MIN: CPT | Mod: PN

## 2022-07-15 PROCEDURE — 97530 THERAPEUTIC ACTIVITIES: CPT | Mod: PN

## 2022-07-15 NOTE — PATIENT INSTRUCTIONS
"Home Exercise Program: 07/15/2022    Double Voiding - to be done after initial urine stream has ended    1. Sit comfortably on toilet with legs and buttocks relaxed.  2. Take a couple deep breaths, letting your belly release and pelvic floor muscles DROP.  3. Tap on your bladder.  4. Rock your pelvis forward and backward and/or stand up then sit back down on the toilet.   5. Wait at least 3 minutes to see if a second urine stream begins.     Do not stop your urine stream or push/strain!     Home Exercise Program: 07/15/2022      INSTRUCTIONS FOR CONTROLLING URINARY URGE      When you experience a strong urge to urinate and know that your bladder is not as full as it has the potential to be (for instance you used the restroom 30 minutes to 1 1/2 hours ago).     FIRST: Stop activity, stand quietly or sit down. Try to stay very still to maintain control. Avoid rushing to the toilet. In this position you can start performing heel raises or toe raises.     SECOND: Begin Quick Flicks (1 second LIFT and squeeze of pelvic floor muscles, 4 second DROP). Pelvic floor contractions send a message to the bladder to relax and hold urine.     THIRD: Relax. Do not rush to the toilet. Take a deep belly or diaphragmatic breath and let it out slowly. Let the urge to urinate pass by using distraction techniques, positive thoughts, visualization, and meditation. Try not to think about going to the bathroom.    FINALLY: If the urge returns, repeat the above steps to regain control. When you feel the urge subside, walk normally to the bathroom. Try to avoid starting to undress until you are in he bathroom and ready to urinate. You can urinate once the urge returns at a later and more appropriate time.     Try this at home first in case you do have an accident, but as you continue to practice, your bladder will increase the ability its capacity and hold more urine without such a strong urge.     "Roll for Control"  Strategies to Delay " "Voiding    What to do when you feel like you "gotta go gotta go!"    Stop what you are doing.    Take a few good deep breaths (this settles down your nervous system and relaxes your bladder).    WHILE YOU CONTINUE DEEP BREATHING, activate your pelvic floor by performing several quick contractions and releases. (Pelvic floor contractions send a message to the bladder to relax and hold urine.)  Sitting: Roll thigh in and out slowly or squeeze knees together  Standing: Roll thigh in/out slowly and gently    As you do the above, you can also count backwards from 100 (to help get your mind off the urge!).     After the urge to void has quietly, you may be able to process with your activity OR if it has been approximately 3 hours since you've voided, you may choose to go to the bathroom and void.      Remember......"Control your bladder before it controls YOU!"    "

## 2022-07-18 PROBLEM — R27.8 COORDINATION IMPAIRMENT: Status: ACTIVE | Noted: 2022-07-18

## 2022-07-18 PROBLEM — M62.89 PELVIC FLOOR DYSFUNCTION: Status: ACTIVE | Noted: 2022-07-18

## 2022-07-18 NOTE — PLAN OF CARE
[facility]  Pelvic Health Physical Therapy Initial Evaluation    Date: 7/15/2022   Name: Irene Lawton  Clinic Number: [id number]  Therapy Diagnosis:   Encounter Diagnoses   Name Primary?    Urinary urgency     Uterovaginal prolapse, incomplete     Coordination impairment     Pelvic floor dysfunction Yes     Physician: Lulu Parham,*    Physician Orders: PT Eval and Treat - Pelvic Floor PT   Medical Diagnosis from Referral:   R39.15 (ICD-10-CM) - Urinary urgency   N81.2 (ICD-10-CM) - Uterovaginal prolapse, incomplete   Evaluation Date: 7/15/2022  Authorization Period Expiration: 6/23/2023  Plan of Care Expiration: 10/15/2022  Visit # / Visits authorized: 1/ 1    Time In: 1206  Time Out: 1250  Total Appointment Time (timed & untimed codes): 44 minutes  Total Billing Time: 30    Precautions: universal    Subjective     Date of onset: 3 months ago     History of current condition - [place] reports: Urinary frequency and urgency that began 2-3 months ago.   UUI experienced when she is rushing to the bathroom, especially as she gets closer and begins to undress. Feels as if this has improved slightly lately. Feels as if she is not completely emptying, but she has been trying to shift pelvis and lean to get more urine out. Denies dysuria, hx of UTIs. Denies KIM. Also reports feelings of vaginal bulging/pressure. Denies abdominal pressure or pain. Was diagnosed with Stage 2 cystocele, rectocele, and apical prolapse. Would like to avoid surgery, so she plans to complete pelvic floor therapy and consider pessary use. Also reports frequent constipation and occasional involuntary loss of gas. Was instructed to begin daily fiber; has noticed slight improvement. Admits that diet has been off lately and does not eat enough fiber-rich foods. Denies pelvic pain, vaginal dryness or irritation. Because vaginal atrophy noted in most recent pelvic exam, she was prescribed estrogen and has been  using.    OB/GYN History:   ,  x2 , Episiotomy, 3rd degree laceration, Tubal ligation, Post-menopausal  Sexually active? No  Pain with vaginal exams, intercourse or tampon use? No    Bladder/Bowel History:    Frequency of urination:   Daytime: Every hour or so - depends on how well she empties the bladder          Nighttime: 1+    Difficulty initiating urine stream: No   Urine stream: splayed   Complete emptying: No   Bladder leakage: Yes   Frequency of incidents: Daily    Amount leaked (urine): teaspoon(s)   Urinary Urgency: Yes  Able to delay urge <1 minute   Frequency of bowel movements: once every 2 days   Difficulty initiating BM: Yes   Quality/Shape of BM: [place] Stool Chart Type  4 and 1   Does Patient Feel Empty after BM? No   Fiber Supplements or Laxative Use?  Yes   Colon leakage: No   Form of protection: panty liner   Number of pads required in 24 hours: 1    PAIN: none     Medical History: Irene  has a past medical history of ALLERGIC RHINITIS, Anemia, Anxiety, Cancer, Hypertension, and Transient elevated blood pressure.     Surgical History:  Irene Lawton  has a past surgical history that includes Tubal ligation; Surgical removal neck cancer (Right); and Colonoscopy (N/A, 2020).    Medications: Irene has a current medication list which includes the following prescription(s): premarin, fluticasone propionate, ibuprofen, ibuprofen-famotidine, multivitamin, and solifenacin.    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   Prior Level of Function: Independent   Current Level of Function: ADLs     Types of fluid intake: up to 1 L of water, occasional soda or juice   Diet: usually 2  meals per day, limited fruit and vegetable intake   Fiber: supplmental   Habitus:overweight  Abuse/Neglect: No     Pts goals: Control urinary symptoms and learn to manage POP to avoid surgery     OBJECTIVE     See EMR under MEDIA for written consent provided 7/15/2022  Chaperone: declined    ORTHO SCREEN  Posture in sitting: WNL  Posture in standing: slightly increased lordosis   Pelvic alignment: no sign of deviations noted in supine   SI Joint Palpation: Denies tenderness to SI joint palpation bilaterally.    LUMBAR ROM: WFL    HIP STRENGTH: NP       ABDOMINAL WALL ASSESSMENT  Palpation: boggy  Abdominal strength: Rectus abdominus: 4/5     Transverse abdominus: 4/5  Scarring: None noted   Pelvic Floor Muscle and Transverse Abdominus Synergy: present  Diastasis: absent      BREATHING MECHANICS ASSESSMENT   Thorax Assessment During Quiet Respiration: WNL excursion of ribcage and WNL excursion of abdominal wall  Thorax Assessment During Deep Respiration: WNL excursion of ribcage and Decreased excursion of abdominal wall     VAGINAL PELVIC FLOOR EXAM - NP this session due to time constraints               TREATMENT     Treatment Time In: 1220  Treatment Time Out: 1250  Total Treatment time (time-based codes) separate from Evaluation: 30 minutes      Neuromuscular Re-education to develop Coordination and Control for 10 minutes includin breathing  and diaphragmatic breathing    DB + PFM mobility/awareness     Therapeutic Activity Patient participated in dynamic functional therapeutic activities to improve functional performance for 20 minutes. Including: Education as described below.     Urge suppression techniques   Double voiding     Patient Education provided:   general anatomy/physiology of urinary/ bowel  system, benefits of treatment, risks of treatment, and alternative methods of treatment were discussed with the pt. Additionally, bladder irritants, anatomy/physiology of pelvic floor, posture/body  mechanices, bladder retraining, double voiding techniques, kegels, proper bearing down techniques, fluid intake/dietary modifications and behavior modifications were reviewed.     Home Exercises Provided:  Written Home Exercises Provided: yes.  Exercises were reviewed and Ireen was able to demonstrate them prior to the end of the session.    Irene demonstrated good  understanding of the education provided.     See EMR under Patient Instructions for exercises provided 7/15/2022.    Assessment     Irene is a 62 y.o. female referred to outpatient Physical Therapy with a medical diagnosis of uterovaginal prolapse and urinary urgency. Pt presents with Stage 2 cystocele, rectocele, and apical prolapse. She demonstrates slight postural abnormality, poor knowledge of body mechanics, and trunk instability. Fair abdominal mobility and good diaphragmatic breathing demonstrated following verbal cueing. Good pelvic floor awareness with mobility training demonstrated, though complete pelvic assessment not performed today. Based on reported symptoms and presentation, pelvic floor dysfunction and limited coordination suspected. Together, deficits have resulted in constipation as well as significant bladder dysfunction, including frequency, urgency, and incontinence. Based on presentation, Irene would benefit from continued pelvic floor PT to restore abdominopelvic muscle balance and improve bladder and bowel control.    Pt prognosis is Good.   Pt will benefit from skilled outpatient Physical Therapy to address the deficits stated above and in the chart below, provide pt/family education, and to maximize pt's level of independence.     Plan of care discussed with patient: Yes  Pt's spiritual, cultural and educational needs considered and patient is agreeable to the plan of care and goals as stated below:       Anticipated Barriers for therapy: none    Medical Necessity is demonstrated by the  following    History  Co-morbidities and personal factors that may impact the plan of care Co-morbidities: allergic rhinitis, anemia, anxiety, cancer, HTN, transient elevated BP    Personal Factors:   lifestyle     moderate   Examination  Body Structures and Functions, activity limitations and participation restrictions that may impact the plan of care Body Regions/Systems/Functions:  altered posture, poor knowledge of body mechanics and posture, poor trunk stability, poor quality of pelvic muscle contraction, increased frequency of urination, increased nocturia, poor coordination of pelvic floor muscles during ADL's leading to urinary or fecal leakage, poor diet, incomplete urination, dysfunctional voiding and dysfunctional defecation     Activity Limitations:  urgency , delaying urge to urinate, initiating a BM, full bladder emptying, sleep uninterrupted by excessive nocturia and incontinence with ADLs    Participation Restrictions:  all ADLs/iADLs uninterrupted by urinary incontinence/urgency/frequency, regularly having a comfortable BM, work duties and Sleep restrictions    Activity limitations:   Learning and applying knowledge  no deficits    General Tasks and Commands  no deficits    Communication  no deficits    Mobility  no deficits    Self care  no deficits    Domestic Life  no deficits    Interactions/Relationships  no deficits    Life Areas  employment    Community and Social Life  community life  recreation and leisure       moderate   Clinical Presentation evolving clinical presentation with changing clinical characteristics moderate   Decision Making/ Complexity Score: moderate       Goals:  Short Term Goals: 5 weeks   Pt to be edu pelvic muscle bracing and be able to consistently perform correctly and quickly to help decrease incontinence with cough/laugh/sneeze.  Pt to demonstrate being able to correctly and consistently perform a kegel which is needed  to increase pelvic floor muscle coordination  and strength needed for continence.  Pt to be able to delay the urge to urinate at least 5 minutes with a strong urge to urinate in order to make it to the bathroom without leaking.  Pt to report a decrease in urinary frequency from every hour to no more than once every 1.5  hour(s) to improve ability to participate in social activities.  Pt to voice understanding of the role that diet plays on urinary urgency.    Pt to demonstrate proper diaphragmatic breathing to help with calming the nervous system in order to decrease pain and to improve abdominal wall musculature extensibility.   Pt to demonstrate good body mechanics when performing ADLs such as lifting and bending to decrease strain to lumbopelvic structures and reduce risk of further injury.   Pt to demonstrate proper positioning on commode with breathing techniques to decrease strain with BM to enable pt to feel empty after BM.   Pt to be able to bulge pelvic floor which is needed for comfortable BM and complete evacuation.     Long Term Goals: 10 weeks   Pt to be discharged with home plan for carry over after discharge.    Pt to report an 90% reduction of urinary incontinence symptoms with ADL participation thereby demonstrating improved pelvic floor muscle control and strength.   Pt to be able to delay the urge to urinate at least 10 minutes with a strong urge to urinate in order to make it to the bathroom without leaking.  Pt to report a decrease in urinary frequency from every 1.5 hours  to no more than once every 2 hour(s) to improve ability to participate in social activities.  Pt to report a decrease in nocturia to no more than 1x/night for improved restorative sleep.    Pt to report being able to have a comfortable vaginal exam without significant increase in pelvic pain.  Pt to increase abdominal wall strength by at least 1 muscle grade to improve lumbopelvic stability with ADLs that would normally increase pain.  Pt to report being able to have a  BM without straining 90% of the time to demonstrate improving PF coordination.   Pt to report being able to have a spontaneous bowel movement at least once every 2 days to demonstrate improving gut motility and pelvic floor coordination.     Plan     Plan of care Certification: 7/15/2022 to 10/15/2022.    Outpatient Physical Therapy 1 times weekly for 10 weeks to include the following interventions: therapeutic exercises, therapeutic activity, neuromuscular re-education, manual therapy, modalities PRN, patient/family education and self care/home management    Plan for next visit: pelvic assessment    MARVIN NARANJO, PT  07/18/2022        I have seen the patient, reviewed the therapist's plan of care, and I agree with the plan of care.      I certify the need for these services furnished under this plan of treatment and while under my care.     ___________________ ________ Physician/Referring Practitioner            ___________________________ Date of Signature

## 2022-07-22 ENCOUNTER — CLINICAL SUPPORT (OUTPATIENT)
Dept: REHABILITATION | Facility: OTHER | Age: 62
End: 2022-07-22
Payer: COMMERCIAL

## 2022-07-22 DIAGNOSIS — M62.89 PELVIC FLOOR DYSFUNCTION: ICD-10-CM

## 2022-07-22 DIAGNOSIS — R27.8 COORDINATION IMPAIRMENT: Primary | ICD-10-CM

## 2022-07-22 PROCEDURE — 97530 THERAPEUTIC ACTIVITIES: CPT | Mod: PN

## 2022-07-22 PROCEDURE — 97112 NEUROMUSCULAR REEDUCATION: CPT | Mod: PN

## 2022-07-22 NOTE — PROGRESS NOTES
Pelvic Health Physical Therapy   Treatment Note     Name: Irene Lawton  Clinic Number: [id number]    Therapy Diagnosis:   Encounter Diagnoses   Name Primary?    Coordination impairment Yes    Pelvic floor dysfunction      Physician: Lulu Parham,*    Visit Date: 7/22/2022    Physician Orders: PT Eval and Treat - Pelvic Floor PT   Medical Diagnosis from Referral:   R39.15 (ICD-10-CM) - Urinary urgency   N81.2 (ICD-10-CM) - Uterovaginal prolapse, incomplete   Evaluation Date: 7/15/2022  Authorization Period Expiration: 12/31/2022  Plan of Care Expiration: 10/15/2022  Visit # / Visits authorized: 1/ 20    Cancelled Visits: 0  No Show Visits: 0    Time In: 1010  Time Out: 1100  Total Billable Time: 50 minutes    Precautions: Standard    Subjective     Pt reports: use breathing to decrease urgency and feelings of potential incontinence. Decreased frequency to 1-1.5 hrs when she is drinking water. No leakage since last week. Has noticed that she is more constipated when she does not drink enough water.    She was compliant with home exercise program.  Response to previous treatment: Good   Functional change: decreased urgency     Pain: n/a    Objective     VAGINAL PELVIC FLOOR EXAM    EXTERNAL ASSESSMENT  Introitus: WNL  Skin condition: WNL  Scarring: none   Sensation: WNL   Pain:  none  Voluntary contraction: visible lift  Voluntary relaxation: visible drop  Involuntary contraction: reflex tightening  Bearing down: bulge  Perineal descent: absent      INTERNAL ASSESSMENT  Pain: none   Sensation: able to localized pressure appropriately   Vaginal vault: asymmetrical   Muscle Bulk: hypertonus on R   Muscle Power: 4/5     Quality of contraction: WNL   Specificity: WNL   Coordination: WNL   Prolapse check:Grade 2 cystocele and Grade 2 rectocele    Irene received therapeutic exercises to develop  strength, endurance, ROM, flexibility, posture and core stabilization for 00 minutes including:      Irene received the following manual therapy techniques: to develop extensibility for 5 minutes including: trigger point/myofascial release of puborectalis on R, B transverse perineals, LA on R     SIP + drops     Irene participated in neuromuscular re-education activities to develop Coordination, Control and Down training for 30 minutes including: pelvic floor relaxation/bulging training, pelvic floor relaxation speed after performing a kegel, diaphragmatic breathing with Kegel and diaphragmatic breathing    DB review - straw breathing   DB + kegel - emphasis on coordinated breathing with partial contraction  DB + drops - complete vs layer specific   Kegel  > drops     Irene participated in dynamic functional therapeutic activities to improve functional performance for 15  minutes, including:    Review of urge suppression techniques, double voiding        Home Exercises Provided and Patient Education Provided     Education provided:   - anatomy/physiology of pelvic floor, bladder retraining, diaphragmatic breathing, kegels, proper bearing down techniques and behavior modifications  Discussed progression of plan of care with patient; educated pt in activity modification; reviewed HEP with pt. Pt demonstrated and verbalized understanding of all instruction and was provided with a handout of HEP (see Patient Instructions).    Written Home Exercises Provided: yes.  Exercises were reviewed and Irene was able to demonstrate them prior to the end of the session.  Irene demonstrated good  understanding of the education provided.     See EMR under Patient Instructions for exercises provided 7/22/2022.    Assessment     Pelvic floor assessment resumed this session, with Irene demonstrating significant pelvic floor tension on R side, though no tenderness noted. Good pelvic floor recruitment noted bilaterally, with improved relaxation of R side immediately following contractions. Based on  performance, HEP updated to include layer-specific relaxation exercises and reverse kegels. Slight VC required to improve coordination of breath and pelvic floor initially, though improvement demonstrated quickly. Based on performance, PT to review NV and assess bowel habits in greater detail.     Irene Is progressing well towards her goals.   Pt prognosis is Good.     Pt will continue to benefit from skilled outpatient physical therapy to address the deficits listed in the problem list box on initial evaluation, provide pt/family education and to maximize pt's level of independence in the home and community environment.     Pt's spiritual, cultural and educational needs considered and pt agreeable to plan of care and goals.     Anticipated barriers to physical therapy: None     Goals:   Short Term Goals: 5 weeks -progressing   Pt to be edu pelvic muscle bracing and be able to consistently perform correctly and quickly to help decrease incontinence with cough/laugh/sneeze.  Pt to demonstrate being able to correctly and consistently perform a kegel which is needed  to increase pelvic floor muscle coordination and strength needed for continence.  Pt to be able to delay the urge to urinate at least 5 minutes with a strong urge to urinate in order to make it to the bathroom without leaking.  Pt to report a decrease in urinary frequency from every hour to no more than once every 1.5  hour(s) to improve ability to participate in social activities.  Pt to voice understanding of the role that diet plays on urinary urgency.    Pt to demonstrate proper diaphragmatic breathing to help with calming the nervous system in order to decrease pain and to improve abdominal wall musculature extensibility.   Pt to demonstrate good body mechanics when performing ADLs such as lifting and bending to decrease strain to lumbopelvic structures and reduce risk of further injury.   Pt to demonstrate proper positioning on commode with  breathing techniques to decrease strain with BM to enable pt to feel empty after BM.   Pt to be able to bulge pelvic floor which is needed for comfortable BM and complete evacuation.      Long Term Goals: 10 weeks -progressing   Pt to be discharged with home plan for carry over after discharge.    Pt to report an 90% reduction of urinary incontinence symptoms with ADL participation thereby demonstrating improved pelvic floor muscle control and strength.   Pt to be able to delay the urge to urinate at least 10 minutes with a strong urge to urinate in order to make it to the bathroom without leaking.  Pt to report a decrease in urinary frequency from every 1.5 hours  to no more than once every 2 hour(s) to improve ability to participate in social activities.  Pt to report a decrease in nocturia to no more than 1x/night for improved restorative sleep.    Pt to report being able to have a comfortable vaginal exam without significant increase in pelvic pain.  Pt to increase abdominal wall strength by at least 1 muscle grade to improve lumbopelvic stability with ADLs that would normally increase pain.  Pt to report being able to have a BM without straining 90% of the time to demonstrate improving PF coordination.   Pt to report being able to have a spontaneous bowel movement at least once every 2 days to demonstrate improving gut motility and pelvic floor coordination.     Plan     Plan for next visit: Review layer-specific drops, Bulge training     MARVIN NARANJO, PT   07/22/2022

## 2022-07-22 NOTE — PATIENT INSTRUCTIONS
([facility])     Relaxation      To isolate layer 1: think about opening around your vagina as you inhale      To isolate layer 2: imagine letting your urethra drop away from you as you inhale      To isolate layer 3: relax at your tailbone to let it expand down and backward as you inhale         So when practicing this at home: Complete 1 minute at each layer 1-2 times per day  Inhale and let pelvic floor muscles expand. Do not push muscles down!  Exhale and let pelvic floor muscles relax. Do not contract!      Reverse Kegels/Pelvic Floor Drops - relaxation practice      The feeling of dropping your pelvic floor is similar to the moment of relief when you have reached the bathroom; when you urinate or have a bowel movement, you first drop your pelvic floor, and let the pelvic floor muscles (PFM) go. Pay attention to this, and see if you can feel that happening. The key to dropping your pelvic floor is visualization, and Deep Breathing. The best way to consciously release tension from the PFMs is to try to release the muscles while you inhale. When you inhale properly with diaphragmatic breathing, your diaphragm actually lowers to make room for the breath, so it is natural to also lower and relax the pelvic floor muscles at the same time. When you exhale, your diaphragm rises to push the air out, and you then naturally raise or contract your PFMs on the breath out. If you can get this Pelvic Floor Rhythm, reverse kegels will be much easier to do.     You can start by gently rosalind your pelvic floor to feel what tightening feels like. Let that go and feel how the tension releases. Really focus on the difference between tension and relaxation. Once you get this, you can go a step further and visualize the muscles between the pubic bone and tailbone lengthen and gently move downwards away from your body. You can visualize your pubic bone moving towards the ceiling and tailbone towards the surface  (if you are laying on your back).         Eventually, once you have mastered the art of relaxing your pelvic floor muscles, you will need to check in with your pelvic floor throughout the day, and let go of any tension that you discover.

## 2022-08-12 ENCOUNTER — CLINICAL SUPPORT (OUTPATIENT)
Dept: REHABILITATION | Facility: OTHER | Age: 62
End: 2022-08-12
Payer: COMMERCIAL

## 2022-08-12 DIAGNOSIS — R27.8 COORDINATION IMPAIRMENT: Primary | ICD-10-CM

## 2022-08-12 DIAGNOSIS — M62.89 PELVIC FLOOR DYSFUNCTION: ICD-10-CM

## 2022-08-12 PROCEDURE — 97530 THERAPEUTIC ACTIVITIES: CPT | Mod: PN

## 2022-08-12 PROCEDURE — 97112 NEUROMUSCULAR REEDUCATION: CPT | Mod: PN

## 2022-08-12 NOTE — PATIENT INSTRUCTIONS
"                        Kegel > Drops:   Contract pelvic floor as you say "one" aloud  Relax pelvic floor as you say "two" aloud  Repeat 5 times, and perform 3 sets per day.       "

## 2022-08-12 NOTE — PROGRESS NOTES
Pelvic Health Physical Therapy   Treatment Note     Name: Irene Lawotn  Clinic Number: [id number]    Therapy Diagnosis:   Encounter Diagnoses   Name Primary?    Coordination impairment Yes    Pelvic floor dysfunction      Physician: Lulu Parham,*    Visit Date: 8/12/2022    Physician Orders: PT Eval and Treat - Pelvic Floor PT   Medical Diagnosis from Referral:   R39.15 (ICD-10-CM) - Urinary urgency   N81.2 (ICD-10-CM) - Uterovaginal prolapse, incomplete   Evaluation Date: 7/15/2022  Authorization Period Expiration: 12/31/2022  Plan of Care Expiration: 10/15/2022  Visit # / Visits authorized: 2/ 20    Cancelled Visits: 0  No Show Visits: 0    Time In: 1214   Time Out: 1300  Total Billable Time: 46 minutes    Precautions: Standard    Subjective     Pt reports: Continued frequency when she is drinking water; sometimes it is <1 hour. Constipation has improved some. Has decreased leakage to only a small amount 1-2 times per week. Better control of urgency also noted.     She was fairly compliant with home exercise program.  Response to previous treatment: Good   Functional change: decreased urgency, leakage     Pain: n/a    Objective       Irene received therapeutic exercises to develop  strength, endurance, ROM, flexibility, posture and core stabilization for 00 minutes including:     Irene received the following manual therapy techniques: to develop extensibility for 0 minutes including: trigger point/myofascial release of puborectalis on R, B transverse perineals, LA on R     SIP + drops     Irene participated in neuromuscular re-education activities to develop Coordination, Control and Down training for 36 minutes including: pelvic floor relaxation/bulging training, pelvic floor relaxation speed after performing a kegel, diaphragmatic breathing with Kegel and diaphragmatic breathing    DB review - straw breathing    In sitting, staggered standing on step, staggered standing,  standing  DB + kegel - emphasis on coordinated breathing with partial contraction  DB + drops - complete vs layer specific    In sitting, staggered standing on step, staggered standing, standing  Kegel  > drops     Irene participated in dynamic functional therapeutic activities to improve functional performance for 10  minutes, including:    Review of urge suppression techniques, double voiding   Review of dietary, fluid intake considerations       Home Exercises Provided and Patient Education Provided     Education provided:   - anatomy/physiology of pelvic floor, bladder retraining, diaphragmatic breathing, kegels, proper bearing down techniques and behavior modifications  Discussed progression of plan of care with patient; educated pt in activity modification; reviewed HEP with pt. Pt demonstrated and verbalized understanding of all instruction and was provided with a handout of HEP (see Patient Instructions).    Written Home Exercises Provided: yes.  Exercises were reviewed and Irene was able to demonstrate them prior to the end of the session.  Irene demonstrated good  understanding of the education provided.     See EMR under Patient Instructions for exercises provided 8/12/2022.    Assessment     Due to persistent tension noted during assessment in previous session, pelvic floor relaxation reviewed this session. Positions of performance progressed to include standing positions, as she continues with slight difficulty of urge suppression at work. Good pelvic floor awareness noted; she was even able to differentiate between levels of asymmetrical activity in staggered stance. Kegel drops also reviewed, with good performance demonstrated, including complete relaxation. Based on performance, Irene would benefit from continued review and progression of mobility training NV.     Irene Is progressing well towards her goals.   Pt prognosis is Good.     Pt will continue to benefit from skilled  outpatient physical therapy to address the deficits listed in the problem list box on initial evaluation, provide pt/family education and to maximize pt's level of independence in the home and community environment.     Pt's spiritual, cultural and educational needs considered and pt agreeable to plan of care and goals.     Anticipated barriers to physical therapy: None     Goals:   Short Term Goals: 5 weeks -progressing   Pt to be edu pelvic muscle bracing and be able to consistently perform correctly and quickly to help decrease incontinence with cough/laugh/sneeze.  Pt to demonstrate being able to correctly and consistently perform a kegel which is needed  to increase pelvic floor muscle coordination and strength needed for continence.  Pt to be able to delay the urge to urinate at least 5 minutes with a strong urge to urinate in order to make it to the bathroom without leaking.  Pt to report a decrease in urinary frequency from every hour to no more than once every 1.5  hour(s) to improve ability to participate in social activities.  Pt to voice understanding of the role that diet plays on urinary urgency.    Pt to demonstrate proper diaphragmatic breathing to help with calming the nervous system in order to decrease pain and to improve abdominal wall musculature extensibility.   Pt to demonstrate good body mechanics when performing ADLs such as lifting and bending to decrease strain to lumbopelvic structures and reduce risk of further injury.   Pt to demonstrate proper positioning on commode with breathing techniques to decrease strain with BM to enable pt to feel empty after BM.   Pt to be able to bulge pelvic floor which is needed for comfortable BM and complete evacuation.      Long Term Goals: 10 weeks -progressing   Pt to be discharged with home plan for carry over after discharge.    Pt to report an 90% reduction of urinary incontinence symptoms with ADL participation thereby demonstrating improved pelvic  floor muscle control and strength.   Pt to be able to delay the urge to urinate at least 10 minutes with a strong urge to urinate in order to make it to the bathroom without leaking.  Pt to report a decrease in urinary frequency from every 1.5 hours  to no more than once every 2 hour(s) to improve ability to participate in social activities.  Pt to report a decrease in nocturia to no more than 1x/night for improved restorative sleep.    Pt to report being able to have a comfortable vaginal exam without significant increase in pelvic pain.  Pt to increase abdominal wall strength by at least 1 muscle grade to improve lumbopelvic stability with ADLs that would normally increase pain.  Pt to report being able to have a BM without straining 90% of the time to demonstrate improving PF coordination.   Pt to report being able to have a spontaneous bowel movement at least once every 2 days to demonstrate improving gut motility and pelvic floor coordination.     Plan     Plan for next visit: Bulge training, Review kegel > drops     MARVIN NARANJO, PT   08/12/2022

## 2022-08-19 ENCOUNTER — CLINICAL SUPPORT (OUTPATIENT)
Dept: REHABILITATION | Facility: OTHER | Age: 62
End: 2022-08-19
Payer: COMMERCIAL

## 2022-08-19 DIAGNOSIS — M62.89 PELVIC FLOOR DYSFUNCTION: ICD-10-CM

## 2022-08-19 DIAGNOSIS — R27.8 COORDINATION IMPAIRMENT: Primary | ICD-10-CM

## 2022-08-19 PROCEDURE — 97530 THERAPEUTIC ACTIVITIES: CPT | Mod: PN

## 2022-08-19 PROCEDURE — 97112 NEUROMUSCULAR REEDUCATION: CPT | Mod: PN

## 2022-08-19 NOTE — PROGRESS NOTES
Pelvic Health Physical Therapy   Treatment Note     Name: Irene Lawton  Clinic Number: [id number]    Therapy Diagnosis:   Encounter Diagnoses   Name Primary?    Coordination impairment Yes    Pelvic floor dysfunction      Physician: Lulu Parham,*    Visit Date: 8/19/2022    Physician Orders: PT Eval and Treat - Pelvic Floor PT   Medical Diagnosis from Referral:   R39.15 (ICD-10-CM) - Urinary urgency   N81.2 (ICD-10-CM) - Uterovaginal prolapse, incomplete   Evaluation Date: 7/15/2022  Authorization Period Expiration: 12/31/2022  Plan of Care Expiration: 10/15/2022  Visit # / Visits authorized: 3/ 20    Cancelled Visits: 0  No Show Visits: 0    Time In: 1010  Time Out: 1100  Total Billable Time: 50 minutes    Precautions: Standard    Subjective     Pt reports: Keeps forgetting to use HEP. Performed 2x this week. Has increased fruit and vegetable intake, which she finds has improved BM. Is planning to meal prep to increase fresh fruits/vegetables and decrease meat and fried foods. Urgency has decreased significantly. Frequency has improved to every 1-2 hours. Feels like she still has to concentrate to empty completely. Has not had any episodes of leakage this week.    She was fairly compliant with home exercise program.  Response to previous treatment: Good   Functional change: decreased urgency, leakage; improved BM      Pain: n/a    Objective       Irene received therapeutic exercises to develop  strength, endurance, ROM, flexibility, posture and core stabilization for 00 minutes including:     Irene received the following manual therapy techniques: to develop extensibility for 0 minutes including: trigger point/myofascial release of puborectalis on R, B transverse perineals, LA on R     SIP + drops     Irene participated in neuromuscular re-education activities to develop Coordination, Control and Down training for 20 minutes including: pelvic floor relaxation/bulging training,  pelvic floor relaxation speed after performing a kegel, diaphragmatic breathing with Kegel and diaphragmatic breathing    DB review - straw breathing    In sitting, staggered standing on step, staggered standing, standing  DB + kegel - emphasis on coordinated breathing with partial contraction  DB + drops - complete vs layer specific    In sitting, staggered standing on step, staggered standing, standing  Kegel  > drops     Irene participated in dynamic functional therapeutic activities to improve functional performance for 30  minutes, including:    Review of urge suppression techniques, double voiding   Review of dietary, fluid intake considerations   Review of stress management   Review of HEP, modifications for improved utilizations       Home Exercises Provided and Patient Education Provided     Education provided:   - anatomy/physiology of pelvic floor, bladder retraining, diaphragmatic breathing, kegels, proper bearing down techniques and behavior modifications  Discussed progression of plan of care with patient; educated pt in activity modification; reviewed HEP with pt. Pt demonstrated and verbalized understanding of all instruction and was provided with a handout of HEP (see Patient Instructions).    Written Home Exercises Provided: yes.  Exercises were reviewed and Irene was able to demonstrate them prior to the end of the session.  Irene demonstrated good  understanding of the education provided.     See EMR under Patient Instructions for exercises provided 8/19/2022.    Assessment     Functional reassessment performed this session, with Irene demonstrating significant improvement in bowel and bladder control. Due to poor use of HEP, review performed today. Good performance of all demonstrated. Methods to improve utilization also reviewed. Based on performance, PT to review once again NV prior to progressing and consider PFM reassessment.     Irene Is progressing well towards her  goals.   Pt prognosis is Good.     Pt will continue to benefit from skilled outpatient physical therapy to address the deficits listed in the problem list box on initial evaluation, provide pt/family education and to maximize pt's level of independence in the home and community environment.     Pt's spiritual, cultural and educational needs considered and pt agreeable to plan of care and goals.     Anticipated barriers to physical therapy: None     Goals:   Short Term Goals: 5 weeks -progressing   Pt to be edu pelvic muscle bracing and be able to consistently perform correctly and quickly to help decrease incontinence with cough/laugh/sneeze. -MET 8/19/2022  Pt to demonstrate being able to correctly and consistently perform a kegel which is needed  to increase pelvic floor muscle coordination and strength needed for continence.-MET 8/19/2022  Pt to be able to delay the urge to urinate at least 5 minutes with a strong urge to urinate in order to make it to the bathroom without leaking.-MET 8/19/2022  Pt to report a decrease in urinary frequency from every hour to no more than once every 1.5  hour(s) to improve ability to participate in social activities.  Pt to voice understanding of the role that diet plays on urinary urgency.  -MET 8/19/2022  Pt to demonstrate proper diaphragmatic breathing to help with calming the nervous system in order to decrease pain and to improve abdominal wall musculature extensibility. -MET 8/19/2022  Pt to demonstrate good body mechanics when performing ADLs such as lifting and bending to decrease strain to lumbopelvic structures and reduce risk of further injury.   Pt to demonstrate proper positioning on commode with breathing techniques to decrease strain with BM to enable pt to feel empty after BM. -MET 8/19/2022  Pt to be able to bulge pelvic floor which is needed for comfortable BM and complete evacuation. -MET 8/19/2022     Long Term Goals: 10 weeks -progressing   Pt to be  discharged with home plan for carry over after discharge.    Pt to report an 90% reduction of urinary incontinence symptoms with ADL participation thereby demonstrating improved pelvic floor muscle control and strength. -MET 8/19/2022  Pt to be able to delay the urge to urinate at least 10 minutes with a strong urge to urinate in order to make it to the bathroom without leaking. -MET 8/19/2022  Pt to report a decrease in urinary frequency from every 1.5 hours  to no more than once every 2 hour(s) to improve ability to participate in social activities.  Pt to report a decrease in nocturia to no more than 1x/night for improved restorative sleep.    Pt to report being able to have a comfortable vaginal exam without significant increase in pelvic pain.   Pt to increase abdominal wall strength by at least 1 muscle grade to improve lumbopelvic stability with ADLs that would normally increase pain.  Pt to report being able to have a BM without straining 90% of the time to demonstrate improving PF coordination. -MET 8/19/2022  Pt to report being able to have a spontaneous bowel movement at least once every 2 days to demonstrate improving gut motility and pelvic floor coordination. -MET 8/19/2022    Plan     Plan for next visit: Bulge training, Review kegel > drops     MARVIN NARANJO, PT   08/19/2022

## 2022-08-19 NOTE — PATIENT INSTRUCTIONS
Reverse Kegels/Pelvic Floor Drops - relaxation practice      The feeling of dropping your pelvic floor is similar to the moment of relief when you have reached the bathroom; when you urinate or have a bowel movement, you first drop your pelvic floor, and let the pelvic floor muscles (PFM) go. Pay attention to this, and see if you can feel that happening. The key to dropping your pelvic floor is visualization, and Deep Breathing. The best way to consciously release tension from the PFMs is to try to release the muscles while you inhale. When you inhale properly with diaphragmatic breathing, your diaphragm actually lowers to make room for the breath, so it is natural to also lower and relax the pelvic floor muscles at the same time. When you exhale, your diaphragm rises to push the air out, and you then naturally raise or contract your PFMs on the breath out. If you can get this Pelvic Floor Rhythm, reverse kegels will be much easier to do.     You can start by gently rosalind your pelvic floor to feel what tightening feels like. Let that go and feel how the tension releases. Really focus on the difference between tension and relaxation. Once you get this, you can go a step further and visualize the muscles between the pubic bone and tailbone lengthen and gently move downwards away from your body. You can visualize your pubic bone moving towards the ceiling and tailbone towards the surface (if you are laying on your back).     Eventually, once you have mastered the art of relaxing your pelvic floor muscles, you will need to check in with your pelvic floor throughout the day, and let go of any tension that you discover.

## 2022-08-29 ENCOUNTER — CLINICAL SUPPORT (OUTPATIENT)
Dept: REHABILITATION | Facility: OTHER | Age: 62
End: 2022-08-29
Payer: COMMERCIAL

## 2022-08-29 DIAGNOSIS — M62.89 PELVIC FLOOR DYSFUNCTION: ICD-10-CM

## 2022-08-29 DIAGNOSIS — R27.8 COORDINATION IMPAIRMENT: Primary | ICD-10-CM

## 2022-08-29 PROCEDURE — 97112 NEUROMUSCULAR REEDUCATION: CPT | Mod: PN

## 2022-08-29 PROCEDURE — 97110 THERAPEUTIC EXERCISES: CPT | Mod: PN

## 2022-08-29 NOTE — PROGRESS NOTES
"  Pelvic Health Physical Therapy   Treatment Note     Name: Irene Lawton  Clinic Number: [id number]    Therapy Diagnosis:   Encounter Diagnoses   Name Primary?    Coordination impairment Yes    Pelvic floor dysfunction      Physician: Lulu Parham,*    Visit Date: 8/29/2022    Physician Orders: PT Eval and Treat - Pelvic Floor PT   Medical Diagnosis from Referral:   R39.15 (ICD-10-CM) - Urinary urgency   N81.2 (ICD-10-CM) - Uterovaginal prolapse, incomplete   Evaluation Date: 7/15/2022  Authorization Period Expiration: 12/31/2022  Plan of Care Expiration: 10/15/2022  Visit # / Visits authorized: 4/ 20    Cancelled Visits: 0  No Show Visits: 0    Time In: 1305  Time Out: 1450  Total Billable Time: 45 minutes    Precautions: Standard    Subjective     Pt reports: Bowel movements have been okay. Has been increasing vegetable intake and cooking at home more. Urinary frequency has decreased to every 2 hours. Has not had any leakage.     She was fairly compliant with home exercise program.  Response to previous treatment: Good   Functional change: decreased urgency, leakage; improved BM      Pain: n/a    Objective       Irene received therapeutic exercises to develop  strength, endurance, ROM, flexibility, posture and core stabilization for 35 minutes including:     Kegel holds 10" holds x5   Bridge x10    + kegel x5    Froggy x10    Staggered x8 ea  Sit <> stands x10   + kegels x10      Irene received the following manual therapy techniques: to develop extensibility for 0 minutes including: trigger point/myofascial release of puborectalis on R, B transverse perineals, LA on R     SIP + drops     Irene participated in neuromuscular re-education activities to develop Coordination, Control and Down training for 10 minutes including: pelvic floor relaxation/bulging training, pelvic floor relaxation speed after performing a kegel, diaphragmatic breathing with Kegel and diaphragmatic " breathing    DB review - straw breathing    In sitting, staggered standing on step, staggered standing, standing  DB + kegel - emphasis on coordinated breathing with partial contraction  DB + drops - complete vs layer specific    In sitting, staggered standing on step, staggered standing, standing  Kegel  > drops     [place] participated in dynamic functional therapeutic activities to improve functional performance for 00  minutes, including:    Review of urge suppression techniques, double voiding   Review of dietary, fluid intake considerations   Review of stress management   Review of HEP, modifications for improved utilizations       Home Exercises Provided and Patient Education Provided     Education provided:   - anatomy/physiology of pelvic floor, bladder retraining, diaphragmatic breathing, kegels, proper bearing down techniques and behavior modifications  Discussed progression of plan of care with patient; educated pt in activity modification; reviewed HEP with pt. Pt demonstrated and verbalized understanding of all instruction and was provided with a handout of HEP (see Patient Instructions).    Written Home Exercises Provided: yes.  Exercises were reviewed and [place] was able to demonstrate them prior to the end of the session.  Irene demonstrated good  understanding of the education provided.     See EMR under Patient Instructions for exercises provided  8/19/2022 .    Assessment     Pelvic floor coordination training progressed this session to include performance of kegels with dynamic exercises. Emphasis placed on improved pressure management. Good performance demonstrated with all, though occasional VC required for improved coordination of breath with performance. [place] continues with tendency to hold breath. HEP updated to include; PT to review NV prior to progressing. Based on performance, [place] would benefit from continued education on improvements in pressure management for  better care of prolapse.    Irene Is progressing well towards her goals.   Pt prognosis is Good.     Pt will continue to benefit from skilled outpatient physical therapy to address the deficits listed in the problem list box on initial evaluation, provide pt/family education and to maximize pt's level of independence in the home and community environment.     Pt's spiritual, cultural and educational needs considered and pt agreeable to plan of care and goals.     Anticipated barriers to physical therapy: None     Goals:   Short Term Goals: 5 weeks -progressing   Pt to be edu pelvic muscle bracing and be able to consistently perform correctly and quickly to help decrease incontinence with cough/laugh/sneeze. -MET 8/19/2022  Pt to demonstrate being able to correctly and consistently perform a kegel which is needed  to increase pelvic floor muscle coordination and strength needed for continence.-MET 8/19/2022  Pt to be able to delay the urge to urinate at least 5 minutes with a strong urge to urinate in order to make it to the bathroom without leaking.-MET 8/19/2022  Pt to report a decrease in urinary frequency from every hour to no more than once every 1.5  hour(s) to improve ability to participate in social activities.- MET 8/29/2022  Pt to voice understanding of the role that diet plays on urinary urgency.  -MET 8/19/2022  Pt to demonstrate proper diaphragmatic breathing to help with calming the nervous system in order to decrease pain and to improve abdominal wall musculature extensibility. -MET 8/19/2022  Pt to demonstrate good body mechanics when performing ADLs such as lifting and bending to decrease strain to lumbopelvic structures and reduce risk of further injury.   Pt to demonstrate proper positioning on commode with breathing techniques to decrease strain with BM to enable pt to feel empty after BM. -MET 8/19/2022  Pt to be able to bulge pelvic floor which is needed for comfortable BM and complete  evacuation. -MET 8/19/2022     Long Term Goals: 10 weeks -progressing   Pt to be discharged with home plan for carry over after discharge.    Pt to report an 90% reduction of urinary incontinence symptoms with ADL participation thereby demonstrating improved pelvic floor muscle control and strength. -MET 8/19/2022  Pt to be able to delay the urge to urinate at least 10 minutes with a strong urge to urinate in order to make it to the bathroom without leaking. -MET 8/19/2022  Pt to report a decrease in urinary frequency from every 1.5 hours  to no more than once every 2 hour(s) to improve ability to participate in social activities.- MET 8/29/2022  Pt to report a decrease in nocturia to no more than 1x/night for improved restorative sleep.    Pt to report being able to have a comfortable vaginal exam without significant increase in pelvic pain.   Pt to increase abdominal wall strength by at least 1 muscle grade to improve lumbopelvic stability with ADLs that would normally increase pain.  Pt to report being able to have a BM without straining 90% of the time to demonstrate improving PF coordination. -MET 8/19/2022  Pt to report being able to have a spontaneous bowel movement at least once every 2 days to demonstrate improving gut motility and pelvic floor coordination. -MET 8/19/2022    Plan     Plan for next visit: Bulge training, Review drops following kegel + exercise     MARVIN NARANJO, PT   08/29/2022

## 2022-09-09 ENCOUNTER — CLINICAL SUPPORT (OUTPATIENT)
Dept: REHABILITATION | Facility: OTHER | Age: 62
End: 2022-09-09
Payer: COMMERCIAL

## 2022-09-09 DIAGNOSIS — M62.89 PELVIC FLOOR DYSFUNCTION: ICD-10-CM

## 2022-09-09 DIAGNOSIS — R27.8 COORDINATION IMPAIRMENT: Primary | ICD-10-CM

## 2022-09-09 PROCEDURE — 97112 NEUROMUSCULAR REEDUCATION: CPT | Mod: PN

## 2022-09-09 PROCEDURE — 97110 THERAPEUTIC EXERCISES: CPT | Mod: PN

## 2022-09-09 PROCEDURE — 97530 THERAPEUTIC ACTIVITIES: CPT | Mod: PN

## 2022-09-09 NOTE — PROGRESS NOTES
"  Pelvic Health Physical Therapy   Treatment Note     Name: Irene Lawton  Clinic Number: [id number]    Therapy Diagnosis:   Encounter Diagnoses   Name Primary?    Coordination impairment Yes    Pelvic floor dysfunction      Physician: Lulu Parham,*    Visit Date: 9/9/2022    Physician Orders: PT Eval and Treat - Pelvic Floor PT   Medical Diagnosis from Referral:   R39.15 (ICD-10-CM) - Urinary urgency   N81.2 (ICD-10-CM) - Uterovaginal prolapse, incomplete   Evaluation Date: 7/15/2022  Authorization Period Expiration: 12/31/2022  Plan of Care Expiration: 10/15/2022  Visit # / Visits authorized: 5/ 20    Cancelled Visits: 0  No Show Visits: 0    Time In: 1030  Time Out: 1110  Total Billable Time: 40 minutes    Precautions: Standard    Subjective     Pt reports: Stomach is not feeling well today; thinks something she ate at dinner last night has upset things. Has not noted any bowel or bladder dysfunction since previous session. Frequency continues to improve gradually. Is concerned about how to return to exercise without worsening prolapse. Denies feelings of pelvic pressure.     She was fairly compliant with home exercise program.  Response to previous treatment: Good   Functional change: decreased urgency, leakage; improved BM      Pain: n/a    Objective       Irene received therapeutic exercises to develop  strength, endurance, ROM, flexibility, posture and core stabilization for 20 minutes including:     Kegel holds 10" holds x5   Bridge x10    + kegel x5    Froggy x10    Staggered x8 ea  Sit <> stands x10   + kegels x10      TrA sets 3x5 ea - sitting, supine, quadruped   Deadlift x5 15#, x5 20#   Suitcase squat x3 ea 20 #     Irene received the following manual therapy techniques: to develop extensibility for 0 minutes including: trigger point/myofascial release of puborectalis on R, B transverse perineals, LA on R     SIP + drops     Irene participated in neuromuscular re-education " activities to develop Coordination, Control and Down training for 10 minutes including: pelvic floor relaxation/bulging training, pelvic floor relaxation speed after performing a kegel, diaphragmatic breathing with Kegel and diaphragmatic breathing    DB review - straw breathing    In sitting, staggered standing on step, staggered standing, standing  DB + kegel - emphasis on coordinated breathing with partial contraction  DB + drops - complete vs layer specific    In sitting, staggered standing on step, staggered standing, standing  Kegel  > drops   Coordination of breathing and bracing with lifting    Followed by complete abdominal and pelvic floor relaxation     Irene participated in dynamic functional therapeutic activities to improve functional performance for 10  minutes, including:    Review of urge suppression techniques, double voiding   Review of dietary, fluid intake considerations   Review of stress management   Review of HEP, modifications for improved utilizations   Pressure management - techniques, considerations       Home Exercises Provided and Patient Education Provided     Education provided:   - anatomy/physiology of pelvic floor, bladder retraining, diaphragmatic breathing, kegels, proper bearing down techniques and behavior modifications  Discussed progression of plan of care with patient; educated pt in activity modification; reviewed HEP with pt. Pt demonstrated and verbalized understanding of all instruction and was provided with a handout of HEP (see Patient Instructions).    Written Home Exercises Provided: yes.  Exercises were reviewed and Irene was able to demonstrate them prior to the end of the session.  [place] demonstrated good  understanding of the education provided.     See EMR under Patient Instructions for exercises provided  8/19/2022 .    Assessment     Due to reports of digestive dysfunction, pelvic reassessment not performed today. Instead, the importance of  proper pressure management reviewed as it relates to preventing the worsening of prolapse. Abdominal and pelvic floor bracing introduced, with Irene demonstrating most difficulty with performance in quadruped. Despite this, all added to HEP for continued practice. PT to review NV. Coordination of bracing and breath work during lifting also introduced, with Irene using 20# load to minic grand daughter's weight. Good form demonstrated, with only minor VC required to prevent lateral trunk leaning. Based on performance, PT to review NV prior to progression.     Irene Is progressing well towards her goals.   Pt prognosis is Good.     Pt will continue to benefit from skilled outpatient physical therapy to address the deficits listed in the problem list box on initial evaluation, provide pt/family education and to maximize pt's level of independence in the home and community environment.     Pt's spiritual, cultural and educational needs considered and pt agreeable to plan of care and goals.     Anticipated barriers to physical therapy: None     Goals:   Short Term Goals: 5 weeks   Pt to be edu pelvic muscle bracing and be able to consistently perform correctly and quickly to help decrease incontinence with cough/laugh/sneeze. -MET 8/19/2022  Pt to demonstrate being able to correctly and consistently perform a kegel which is needed  to increase pelvic floor muscle coordination and strength needed for continence.-MET 8/19/2022  Pt to be able to delay the urge to urinate at least 5 minutes with a strong urge to urinate in order to make it to the bathroom without leaking.-MET 8/19/2022  Pt to report a decrease in urinary frequency from every hour to no more than once every 1.5  hour(s) to improve ability to participate in social activities.- MET 8/29/2022  Pt to voice understanding of the role that diet plays on urinary urgency.  -MET 8/19/2022  Pt to demonstrate proper diaphragmatic breathing to help with  calming the nervous system in order to decrease pain and to improve abdominal wall musculature extensibility. -MET 8/19/2022  Pt to demonstrate good body mechanics when performing ADLs such as lifting and bending to decrease strain to lumbopelvic structures and reduce risk of further injury. -progressing  Pt to demonstrate proper positioning on commode with breathing techniques to decrease strain with BM to enable pt to feel empty after BM. -MET 8/19/2022  Pt to be able to bulge pelvic floor which is needed for comfortable BM and complete evacuation. -MET 8/19/2022     Long Term Goals: 10 weeks  Pt to be discharged with home plan for carry over after discharge.  -progressing  Pt to report an 90% reduction of urinary incontinence symptoms with ADL participation thereby demonstrating improved pelvic floor muscle control and strength. -MET 8/19/2022  Pt to be able to delay the urge to urinate at least 10 minutes with a strong urge to urinate in order to make it to the bathroom without leaking. -MET 8/19/2022  Pt to report a decrease in urinary frequency from every 1.5 hours  to no more than once every 2 hour(s) to improve ability to participate in social activities.- MET 8/29/2022  Pt to report a decrease in nocturia to no more than 1x/night for improved restorative sleep.    Pt to report being able to have a comfortable vaginal exam without significant increase in pelvic pain.   Pt to increase abdominal wall strength by at least 1 muscle grade to improve lumbopelvic stability with ADLs that would normally increase pain.  Pt to report being able to have a BM without straining 90% of the time to demonstrate improving PF coordination. -MET 8/19/2022  Pt to report being able to have a spontaneous bowel movement at least once every 2 days to demonstrate improving gut motility and pelvic floor coordination. -MET 8/19/2022    Plan     Plan for next visit:  review pressure management, TrA progression in quadruped, Review  drops following kegel + exercise     MARVIN NARANJO, PT   09/09/2022

## 2022-09-16 ENCOUNTER — CLINICAL SUPPORT (OUTPATIENT)
Dept: REHABILITATION | Facility: OTHER | Age: 62
End: 2022-09-16
Payer: COMMERCIAL

## 2022-09-16 DIAGNOSIS — R27.8 COORDINATION IMPAIRMENT: Primary | ICD-10-CM

## 2022-09-16 DIAGNOSIS — M62.89 PELVIC FLOOR DYSFUNCTION: ICD-10-CM

## 2022-09-16 PROCEDURE — 97530 THERAPEUTIC ACTIVITIES: CPT | Mod: PN

## 2022-09-16 PROCEDURE — 97110 THERAPEUTIC EXERCISES: CPT | Mod: PN

## 2022-09-16 PROCEDURE — 97112 NEUROMUSCULAR REEDUCATION: CPT | Mod: PN

## 2022-09-16 NOTE — PROGRESS NOTES
"  Pelvic Health Physical Therapy   Treatment Note     Name: Irene Lawton  Clinic Number: [id number]    Therapy Diagnosis:   Encounter Diagnoses   Name Primary?    Coordination impairment Yes    Pelvic floor dysfunction        Physician: Lulu Parham,*    Visit Date: 9/16/2022    Physician Orders: PT Eval and Treat - Pelvic Floor PT   Medical Diagnosis from Referral:   R39.15 (ICD-10-CM) - Urinary urgency   N81.2 (ICD-10-CM) - Uterovaginal prolapse, incomplete   Evaluation Date: 7/15/2022  Authorization Period Expiration: 12/31/2022  Plan of Care Expiration: 10/15/2022  Visit # / Visits authorized: 6/ 20    Cancelled Visits: 0  No Show Visits: 0    Time In: 1105  Time Out: 1200  Total Billable Time: 55  minutes    Precautions: Standard    Subjective     Pt reports: Felt good about exercises at home, but was not consistent with coordination of breathing during performance.       She was fairly compliant with home exercise program.  Response to previous treatment: Good   Functional change: good bladder control, improved independence with exercise    Pain: n/a    Objective     VAGINAL PELVIC FLOOR EXAM    EXTERNAL ASSESSMENT  Introitus: WNL  Skin condition: WNL  Scarring: none   Sensation: WNL   Pain:  none  Voluntary contraction: visible lift  Voluntary relaxation: visible drop  Involuntary contraction: reflex tightening - inconsistent  Bearing down: bulge  Perineal descent: absent      INTERNAL ASSESSMENT  Pain: none   Sensation: able to localized pressure appropriately   Vaginal vault: WNL   Muscle Bulk: WFL   Muscle Power: 4/5    Quality of contraction: WNL   Specificity: WNL   Coordination: inconsistent   Comments: Endurance not measured, though she was able to hold throughout bridging      Irene received therapeutic exercises to develop  strength, endurance, ROM, flexibility, posture and core stabilization for 35  minutes including:     Kegel holds 10" holds x5   Bridge x10    + kegel x5 " "   Froggy x10    Staggered x8 ea  Sit <> stands x10   + kegels x10        Deadlift 4x5 15#, x5 20#   Suitcase squat 2x5 ea 20 #     + Planks at lowered mat3 x20"  + Mod side planks at lowered mat 2x10" ea      Irene received the following manual therapy techniques: to develop extensibility for 0 minutes including: trigger point/myofascial release of puborectalis on R, B transverse perineals, LA on R     SIP + drops     Irene participated in neuromuscular re-education activities to develop Coordination, Control and Down training for 10 minutes including: pelvic floor relaxation/bulging training, pelvic floor relaxation speed after performing a kegel, diaphragmatic breathing with Kegel and diaphragmatic breathing    DB review - straw breathing    In sitting, staggered standing on step, staggered standing, standing  DB + kegel - emphasis on coordinated breathing with partial contraction  DB + drops - complete vs layer specific    In sitting, staggered standing on step, staggered standing, standing  Kegel  > drops   Coordination of breathing and bracing with lifting    Followed by complete abdominal and pelvic floor relaxation     Irene participated in dynamic functional therapeutic activities to improve functional performance for 10 minutes, including:    Review of urge suppression techniques, double voiding   Review of dietary, fluid intake considerations   Review of stress management   Review of HEP, modifications for improved utilizations   Pressure management - techniques, considerations   Lifting mechanics   Benefits of walking program      Home Exercises Provided and Patient Education Provided     Education provided:   - anatomy/physiology of pelvic floor, bladder retraining, diaphragmatic breathing, kegels, proper bearing down techniques and behavior modifications  Discussed progression of plan of care with patient; educated pt in activity modification; reviewed HEP with pt. Pt demonstrated and " verbalized understanding of all instruction and was provided with a handout of HEP (see Patient Instructions).    Written Home Exercises Provided: yes.  Exercises were reviewed and Irene was able to demonstrate them prior to the end of the session.  Irene demonstrated good  understanding of the education provided.     See EMR under Patient Instructions for exercises provided  8/19/2022 .    Assessment     Irene performed well in today's session, tolerating progression of coordination and strength training. Pelvic floor reassessed prior to strength training to ensure good pelvic floor awareness and relaxation. Both demonstrated, though she remains limited by coordination of breathing. Because of this, pelvic floor activity measured manually during bridging. Good activation demonstrated with bridge alone and increased activation noted with co-contraction of pelvic floor. When coordinated exhale added during contraction, decreased pelvic floor recruitment noted. Because of this, coordinated breathing not emphasized in today's session. Instead, Irene encouraged simply to avoid breath holding. Slight VC required initially during lifting for improved posterior weight shift, but improve performance and knowledge of performance demonstrated with continued practice. She also performed well with planks from elevated surface and seemed to enjoy; PT to continued with use and progress NV.     Irene Is progressing well towards her goals.   Pt prognosis is Good.     Pt will continue to benefit from skilled outpatient physical therapy to address the deficits listed in the problem list box on initial evaluation, provide pt/family education and to maximize pt's level of independence in the home and community environment.     Pt's spiritual, cultural and educational needs considered and pt agreeable to plan of care and goals.     Anticipated barriers to physical therapy: None     Goals:   Short Term Goals: 5  weeks   Pt to be edu pelvic muscle bracing and be able to consistently perform correctly and quickly to help decrease incontinence with cough/laugh/sneeze. -MET 8/19/2022  Pt to demonstrate being able to correctly and consistently perform a kegel which is needed  to increase pelvic floor muscle coordination and strength needed for continence.-MET 8/19/2022  Pt to be able to delay the urge to urinate at least 5 minutes with a strong urge to urinate in order to make it to the bathroom without leaking.-MET 8/19/2022  Pt to report a decrease in urinary frequency from every hour to no more than once every 1.5  hour(s) to improve ability to participate in social activities.- MET 8/29/2022  Pt to voice understanding of the role that diet plays on urinary urgency.  -MET 8/19/2022  Pt to demonstrate proper diaphragmatic breathing to help with calming the nervous system in order to decrease pain and to improve abdominal wall musculature extensibility. -MET 8/19/2022  Pt to demonstrate good body mechanics when performing ADLs such as lifting and bending to decrease strain to lumbopelvic structures and reduce risk of further injury. -progressing  Pt to demonstrate proper positioning on commode with breathing techniques to decrease strain with BM to enable pt to feel empty after BM. -MET 8/19/2022  Pt to be able to bulge pelvic floor which is needed for comfortable BM and complete evacuation. -MET 8/19/2022     Long Term Goals: 10 weeks  Pt to be discharged with home plan for carry over after discharge.  -progressing  Pt to report an 90% reduction of urinary incontinence symptoms with ADL participation thereby demonstrating improved pelvic floor muscle control and strength. -MET 8/19/2022  Pt to be able to delay the urge to urinate at least 10 minutes with a strong urge to urinate in order to make it to the bathroom without leaking. -MET 8/19/2022  Pt to report a decrease in urinary frequency from every 1.5 hours  to no more  than once every 2 hour(s) to improve ability to participate in social activities.- MET 8/29/2022  Pt to report a decrease in nocturia to no more than 1x/night for improved restorative sleep.    Pt to report being able to have a comfortable vaginal exam without significant increase in pelvic pain.   Pt to increase abdominal wall strength by at least 1 muscle grade to improve lumbopelvic stability with ADLs that would normally increase pain.  Pt to report being able to have a BM without straining 90% of the time to demonstrate improving PF coordination. -MET 8/19/2022  Pt to report being able to have a spontaneous bowel movement at least once every 2 days to demonstrate improving gut motility and pelvic floor coordination. -MET 8/19/2022    Plan     Plan for next visit:  review HEP and TrA recruitment, progress plank series  Add TrA sets 3x5 ea - sitting, supine, quadruped    + quad hip ABD   + quad   MARVIN NARANJO, PT   09/16/2022

## 2022-09-26 ENCOUNTER — TELEPHONE (OUTPATIENT)
Dept: BARIATRICS | Facility: CLINIC | Age: 62
End: 2022-09-26
Payer: COMMERCIAL

## 2022-10-04 ENCOUNTER — TELEPHONE (OUTPATIENT)
Dept: BARIATRICS | Facility: CLINIC | Age: 62
End: 2022-10-04
Payer: COMMERCIAL

## 2022-10-06 ENCOUNTER — PATIENT MESSAGE (OUTPATIENT)
Dept: BARIATRICS | Facility: CLINIC | Age: 62
End: 2022-10-06
Payer: COMMERCIAL

## 2022-10-12 ENCOUNTER — TELEPHONE (OUTPATIENT)
Dept: BARIATRICS | Facility: CLINIC | Age: 62
End: 2022-10-12
Payer: COMMERCIAL

## 2022-10-12 ENCOUNTER — PATIENT MESSAGE (OUTPATIENT)
Dept: BARIATRICS | Facility: CLINIC | Age: 62
End: 2022-10-12
Payer: COMMERCIAL

## 2022-10-12 NOTE — TELEPHONE ENCOUNTER
----- Message from Darius Tremayne Brown sent at 10/12/2022  8:40 AM CDT -----  Regarding: pt advice  Contact: pt @ [phone]  Pt Irene aLwton is calling in regards to missed support meeting on 10/11/2022 at 5:30 pm and is requesting to speak wit some in Bariatrics in regards. Please call.

## 2022-10-12 NOTE — TELEPHONE ENCOUNTER
Phoned patient in response to the portal message and schedule a new consult for 10/18/2022 at 130 PM with Dr Alvarado.

## 2022-10-19 ENCOUNTER — OFFICE VISIT (OUTPATIENT)
Dept: UROGYNECOLOGY | Facility: CLINIC | Age: 62
End: 2022-10-19
Payer: COMMERCIAL

## 2022-10-19 VITALS
HEIGHT: 63 IN | SYSTOLIC BLOOD PRESSURE: 140 MMHG | WEIGHT: 188.25 LBS | BODY MASS INDEX: 33.36 KG/M2 | DIASTOLIC BLOOD PRESSURE: 80 MMHG

## 2022-10-19 DIAGNOSIS — N81.2 UTEROVAGINAL PROLAPSE, INCOMPLETE: ICD-10-CM

## 2022-10-19 DIAGNOSIS — K59.00 CONSTIPATION, UNSPECIFIED CONSTIPATION TYPE: ICD-10-CM

## 2022-10-19 DIAGNOSIS — N95.2 VAGINAL ATROPHY: ICD-10-CM

## 2022-10-19 DIAGNOSIS — R39.15 URINARY URGENCY: Primary | ICD-10-CM

## 2022-10-19 DIAGNOSIS — N81.10 PROLAPSE OF ANTERIOR VAGINAL WALL: ICD-10-CM

## 2022-10-19 PROCEDURE — 99213 PR OFFICE/OUTPT VISIT, EST, LEVL III, 20-29 MIN: ICD-10-PCS | Mod: S$GLB,,, | Performed by: NURSE PRACTITIONER

## 2022-10-19 PROCEDURE — 99999 PR PBB SHADOW E&M-EST. PATIENT-LVL III: ICD-10-PCS | Mod: PBBFAC,,, | Performed by: NURSE PRACTITIONER

## 2022-10-19 PROCEDURE — 1160F PR REVIEW ALL MEDS BY PRESCRIBER/CLIN PHARMACIST DOCUMENTED: ICD-10-PCS | Mod: CPTII,S$GLB,, | Performed by: NURSE PRACTITIONER

## 2022-10-19 PROCEDURE — 3077F SYST BP >= 140 MM HG: CPT | Mod: CPTII,S$GLB,, | Performed by: NURSE PRACTITIONER

## 2022-10-19 PROCEDURE — 3079F PR MOST RECENT DIASTOLIC BLOOD PRESSURE 80-89 MM HG: ICD-10-PCS | Mod: CPTII,S$GLB,, | Performed by: NURSE PRACTITIONER

## 2022-10-19 PROCEDURE — 99999 PR PBB SHADOW E&M-EST. PATIENT-LVL III: CPT | Mod: PBBFAC,,, | Performed by: NURSE PRACTITIONER

## 2022-10-19 PROCEDURE — 99213 OFFICE O/P EST LOW 20 MIN: CPT | Mod: S$GLB,,, | Performed by: NURSE PRACTITIONER

## 2022-10-19 PROCEDURE — 1159F PR MEDICATION LIST DOCUMENTED IN MEDICAL RECORD: ICD-10-PCS | Mod: CPTII,S$GLB,, | Performed by: NURSE PRACTITIONER

## 2022-10-19 PROCEDURE — 3044F PR MOST RECENT HEMOGLOBIN A1C LEVEL <7.0%: ICD-10-PCS | Mod: CPTII,S$GLB,, | Performed by: NURSE PRACTITIONER

## 2022-10-19 PROCEDURE — 3077F PR MOST RECENT SYSTOLIC BLOOD PRESSURE >= 140 MM HG: ICD-10-PCS | Mod: CPTII,S$GLB,, | Performed by: NURSE PRACTITIONER

## 2022-10-19 PROCEDURE — 3044F HG A1C LEVEL LT 7.0%: CPT | Mod: CPTII,S$GLB,, | Performed by: NURSE PRACTITIONER

## 2022-10-19 PROCEDURE — 1160F RVW MEDS BY RX/DR IN RCRD: CPT | Mod: CPTII,S$GLB,, | Performed by: NURSE PRACTITIONER

## 2022-10-19 PROCEDURE — 1159F MED LIST DOCD IN RCRD: CPT | Mod: CPTII,S$GLB,, | Performed by: NURSE PRACTITIONER

## 2022-10-19 PROCEDURE — 3079F DIAST BP 80-89 MM HG: CPT | Mod: CPTII,S$GLB,, | Performed by: NURSE PRACTITIONER

## 2022-10-19 NOTE — PROGRESS NOTES
Urogyn follow up  10/29/2022  .  [facility]  [address] [id number]    Irene Lawton  [id number]  1960      Irene Lawton is a 62 y.o. here for a urogyn follow up for urinary frequency    Last HPI from 06/23/2022  Chief Complaint:  Urinary Frequency     History of Present Illness  HPI 62 Y O F  has a past medical history of ALLERGIC RHINITIS, Anemia, Anxiety, Cancer, Hypertension, and Transient elevated blood pressure.  Referred by Dr. Laguerre  for evaluation of urinary frequency and urgency.          [facility]     Question 6/22/2022 10:59 AM CDT - Filed by Patient   General Urogynecology: Are you experiencing the following?     Dysuria (painful urination) No   Nocturia:  waking up at night to empty your bladder  Yes   If you answered yes to the previous question, how many times does this happen per night? 1-2   Enuresis (urine loss during sleep) No   Dribbling urine after you urinate No   Hematuria (urine appears red) No   Type of stream Sprays   Urinary frequency: How often a day are you going to the bathroom per day?  Less than 10   Urinary Tract Infections: How many Urinary Tract Infections have you had in the past year? I have not had a UTI in the past year   If you have had a UTI in the past year, what treatments have you had so far?  I have not had a UTI in the past year   Urinary Incontinence (General): Are you experiencing the following?     Past consultation for incontinence: Have you ever seen someone for the evaluation of incontinence? No   If you answered yes to the previous question, please select all the therapies you have tried.  None of the above   Please note the effectiveness of the therapies. Ineffective   Need to wear protection to keep clothes dry  Yes   If you answered yes to the previous question, please shabana the protection you use.  Panty liner   If you wear protection, how much wetness is typically on each pad? Light   If  "you wear protection, how often do you have to change per day, if applicable?  1   Stress Symptoms: Are you experiencing the following?     Leakage of urine with cough, laugh and/or sneeze No   If you answered yes to the previous question, what is the frequency in days, weeks and/or months? Never   Leakage of urine with sex No   Leakage of urine with bending/ lifting No   Leakage of urine with briskly walking or jogging No   If you lose urine for any other reason not previously mentioned, please note it below, if applicable.      Urge Symptoms: Are you experiencing the following?     Urgency ("got to go" feeling) No   Urge: How frequently do you feel an urge to urinate (feeling like you "gotta go" to the bathroom and can't wait) Never   Do you experience a leakage of urine when you have a feeling of urgency?  Yes   Leakage of urine when unaware No   Past use of anticholinergics (medications used to treat overactive bladder) No   If you answered yes to the previous question, please shabana the anticholinergics you have used:      Have you ever used Mirbetriq (aka Mirabegron)?  No   Prolapse Symptoms: Are you experiencing any of the following?      Falling out/ Bulging/ Heaviness in the vagina Yes   Vaginal/ Abdominal Pain/ Pressure No   Need to strain/ Push to void No   Need to wait on the toilet before you void No   Unusual position to urinate (using your hands to push back the vaginal bulge) No   Sensation of incomplete emptying Yes   Past use of pessary device No   If you answered yes to the previous question, please list the devices you have used below.      Bowel Symptoms: Are you experiencing any of the following?     Constipation Yes   Diarrhea  No   Hematochezia (bloody stool) No   Incomplete evacuation of stool Yes   Involuntary loss of formed stool No   Fecal smearing/urgency No   Involuntary loss of gas Yes   Vaginal Symptoms: Are you experiencing any of the following?      Abnormal vaginal bleeding  No "   Vaginal dryness No   Sexually active  No   Dyspareunia (painful intercourse) No   Estrogen use  No          Changes from last visit:  1. Urge incontinence, rare  --took vesicare 5 mg intermittently  --voiding every hour-2 hours  --nocturia 1/night  --Denies UI unless she ignores urger     2. Prolapse: Stage 2 apical prolapse, Stage 2 anterior and posterior vaginal wall prolapse   --does not feel like prolapse has worsened     3. Constipation:  --denies constipation  --doing intermittent fasting  --eating fiber one     4. Vaginal atrophy (dryness):    --not using vaginal estrogen cream    Past Medical History:   Diagnosis Date    ALLERGIC RHINITIS     Anemia     Anxiety     Cancer     right neck    Hypertension     Transient elevated blood pressure        Past Surgical History:   Procedure Laterality Date    COLONOSCOPY N/A 12/12/2020    Procedure: COLONOSCOPY;  Surgeon: GHADA Gamez MD;  Location: [address]);  Service: Endoscopy;  Laterality: N/A;  Pt reports family Hx colon cancer (Pt's father )  prep ins. emailed - COVID screening on 12/9/20 [facility]- [facility]    Surgical removal neck cancer Right     TUBAL LIGATION         Family History   Problem Relation Age of Onset    Hypertension Mother     Breast cancer Mother     Colon cancer Neg Hx     Ovarian cancer Neg Hx        Social History     Socioeconomic History    Marital status: Single    Number of children: 2    Years of education: 14 years   Occupational History    Occupation: [employer] manager     Employer: [id number]   Tobacco Use    Smoking status: Never    Smokeless tobacco: Never   Substance and Sexual Activity    Alcohol use: Yes     Comment: Social    Drug use: No    Sexual activity: Yes     Partners: Male     Birth control/protection: Surgical   Social History Narrative    Works at [employer] and does not exercise regularly       Current Outpatient Medications   Medication Sig Dispense Refill    conjugated estrogens (PREMARIN) vaginal  "cream 1 g with applicator or dime-sized amt with finger in vagina nightly x 2 weeks, then twice a week thereafter 45 g 12    fluticasone propionate (FLONASE) 50 mcg/actuation nasal spray Use 1 spray(s) in each nostril once daily 16 g 0    ibuprofen (ADVIL,MOTRIN) 800 MG tablet Take 1 tablet (800 mg total) by mouth 3 (three) times daily as needed for Pain. 90 tablet 0    MULTIVITAMIN ORAL Take by mouth.      solifenacin (VESICARE) 5 MG tablet Take 1 tablet (5 mg total) by mouth once daily. 30 tablet 11    ibuprofen-famotidine (DUEXIS) 800-26.6 mg Tab TAKE ONE TABLET BY MOUTH THREE TIMES DAILY AS NEEDED 90 tablet 0     No current facility-administered medications for this visit.       Review of patient's allergies indicates:   Allergen Reactions    No known allergies        Well woman:  Pap:06/2021 normal HPV negative  Mammo:10/2021 normal  Colonoscopy:12/2020 polyps  Dexa:none on file    ROS:  As per HPI.      Exam  BP (!) 140/80 (BP Location: Right arm, Patient Position: Sitting, BP Method: Medium (Manual))   Ht 5' 3" (1.6 m)   Wt 85.4 kg (188 lb 4.4 oz)   LMP 06/27/2012   BMI 33.35 kg/m²   General: alert and oriented, no acute distress  Respiratory: normal respiratory effort  Abd: soft, non-tender, non-distended    Pelvic  Ext. Genitalia: normal external genitalia. Normal bartholin's and skeens glands  Vagina: + atrophy. Normal vaginal mucosa without lesions. No discharge noted.   Non-tender bladder base without palpable mass.  Cervix: no lesions  Uterus:  uterus is normal size, shape, consistency and nontender   Urethra: no masses or tenderness  Urethral meatus: no lesions, caruncle or prolapse.    Impression  1. Urinary urgency        2. Uterovaginal prolapse, incomplete        3. Prolapse of anterior vaginal wall        4. Constipation, unspecified constipation type        5. Vaginal atrophy          We reviewed the above issues and discussed options for short-term versus long-term management of her " problems.   Plan:   1. Urge incontinence, rare  --Bladder diary for 3-7 days, write the number of times you go to the rest room and associated symptoms of urgency or leakage.   --Empty bladder every 3 hours.  Empty well: wait a minute, lean forward on toilet.    --Avoid dietary irritants (see sheet).  Keep diary x 3-5 days to determine your irritants.  --KEGELS: do 10 in AM and 10 in PM, holding each x 10 seconds.  When you feel urge to go, STOP, KEGEL, and when urge has passed, then go to bathroom.   PT in future.    --URGE: hold  vesicare 5 mg for now     2. Prolapse: Stage 2 apical prolapse, Stage 2 anterior and posterior vaginal wall prolapse   --Pessary benefit discussed with patient, will schedule for pessary fitting   --Daily pelvic floor exercises as assigned,  Pelvic floor PT   --Non surgical options discussed with patient    --Surgical options discussed such as : Hysterectomy (vaginal or laparoscopic)  with apical suspension: SSF, USLS and SCP with mesh augmentation. We also discussed the pro's and con's of hysteropexy.  Risks/ benefits/ alternatives/ succuss and failure rates were reviewed. Information packets were given detailing surgical options.  She was also given web site information for: www.augs.org and www.Color Promossforpfd.org and http://www.fda.gov/MedicalDevices/UroGynSurgicalMesh/default.htm to review the details of the surgical options discussed and the use of mesh in surgery. She will review the information given and we will discuss further at the follow up visit. She would like to avoid surgery therefore will continue with pessary fitting and pelvic floor PT.      3. Constipation:  Controlling constipation may help bladder urgency/leakage and fiber may better control cholesterol and blood glucose.  Start daily fiber (fibercon).  Take 1 tsp of fiber powder (psyllium or other sugar-free powder).  Mix in 8 oz of water.  Take x 3-5 days.  Then, increase fiber by 1 tsp every 3-5 days until stool is  easy to pass.  Stop and continue at that dose.   Do not exceed 6 tsps/day.  May also use over the counter stool softener 1-2 x/day.  AVOID laxatives.     4. Vaginal atrophy (dryness):  Use 1 gram of estrogen cream in vagina  twice a week     5. RTC for pessary fitting  I spent a total of 20 minutes on the day of the visit.  This includes face to face time and non-face to face time preparing to see the patient (eg, review of tests), obtaining and/or reviewing separately obtained history, documenting clinical information in the electronic or other health record, independently interpreting results and communicating results to the patient/family/caregiver, or care coordinator.       Anupama Mai, [id number]-BC [facility]  Division of Female Pelvic Medicine and Reconstructive Surgery  Department of Obstetrics & Gynecology

## 2022-10-19 NOTE — PATIENT INSTRUCTIONS
1. Urge incontinence, rare  --Bladder diary for 3-7 days, write the number of times you go to the rest room and associated symptoms of urgency or leakage.   --Empty bladder every 3 hours.  Empty well: wait a minute, lean forward on toilet.    --Avoid dietary irritants (see sheet).  Keep diary x 3-5 days to determine your irritants.  --KEGELS: do 10 in AM and 10 in PM, holding each x 10 seconds.  When you feel urge to go, STOP, KEGEL, and when urge has passed, then go to bathroom.   PT in future.    --URGE: hold  vesicare 5 mg for now     2. Prolapse: Stage 2 apical prolapse, Stage 2 anterior and posterior vaginal wall prolapse   --Pessary benefit discussed with patient, will schedule for pessary fitting   --Daily pelvic floor exercises as assigned,  Pelvic floor PT   --Non surgical options discussed with patient    --Surgical options discussed such as : Hysterectomy (vaginal or laparoscopic)  with apical suspension: SSF, USLS and SCP with mesh augmentation. We also discussed the pro's and con's of hysteropexy.  Risks/ benefits/ alternatives/ succuss and failure rates were reviewed. Information packets were given detailing surgical options.  She was also given web site information for: www.augs.org and www.Vantrixsforpfd.org and http://www.fda.gov/MedicalDevices/UroGynSurgicalMesh/default.htm to review the details of the surgical options discussed and the use of mesh in surgery. She will review the information given and we will discuss further at the follow up visit. She would like to avoid surgery therefore will continue with pessary fitting and pelvic floor PT.      3. Constipation:  Controlling constipation may help bladder urgency/leakage and fiber may better control cholesterol and blood glucose.  Start daily fiber (fibercon).  Take 1 tsp of fiber powder (psyllium or other sugar-free powder).  Mix in 8 oz of water.  Take x 3-5 days.  Then, increase fiber by 1 tsp every 3-5 days until stool is easy to pass.  Stop  and continue at that dose.   Do not exceed 6 tsps/day.  May also use over the counter stool softener 1-2 x/day.  AVOID laxatives.     4. Vaginal atrophy (dryness):  Use 1 gram of estrogen cream in vagina  twice a week     5. RTC for pessary fitting

## 2022-10-20 ENCOUNTER — TELEPHONE (OUTPATIENT)
Dept: INTERNAL MEDICINE | Facility: CLINIC | Age: 62
End: 2022-10-20
Payer: COMMERCIAL

## 2022-10-20 DIAGNOSIS — Z12.31 OTHER SCREENING MAMMOGRAM: ICD-10-CM

## 2022-10-20 RX ORDER — BENZONATATE 100 MG/1
100 CAPSULE ORAL 3 TIMES DAILY PRN
Qty: 30 CAPSULE | Refills: 1 | Status: SHIPPED | OUTPATIENT
Start: 2022-10-20 | End: 2022-10-30

## 2022-10-20 NOTE — TELEPHONE ENCOUNTER
----- Message from Carrie Rossi sent at 10/20/2022  7:18 AM CDT -----  Contact: MIN SANFORD [[id number]][phone]  After receving her flu shot she has developed a cough. Can you call in an Rx for her cough?

## 2022-10-25 ENCOUNTER — PATIENT MESSAGE (OUTPATIENT)
Dept: ADMINISTRATIVE | Facility: HOSPITAL | Age: 62
End: 2022-10-25
Payer: COMMERCIAL

## 2022-11-18 ENCOUNTER — HOSPITAL ENCOUNTER (OUTPATIENT)
Dept: RADIOLOGY | Facility: OTHER | Age: 62
Discharge: HOME | End: 2022-11-18
Attending: INTERNAL MEDICINE
Payer: COMMERCIAL

## 2022-11-18 DIAGNOSIS — Z12.31 OTHER SCREENING MAMMOGRAM: ICD-10-CM

## 2022-11-18 PROCEDURE — 77063 BREAST TOMOSYNTHESIS BI: CPT | Mod: TC

## 2022-11-18 PROCEDURE — 77067 SCR MAMMO BI INCL CAD: CPT | Mod: 26,,, | Performed by: RADIOLOGY

## 2022-11-18 PROCEDURE — 77067 SCR MAMMO BI INCL CAD: CPT | Mod: TC

## 2022-11-18 PROCEDURE — 77067 MAMMO DIGITAL SCREENING BILAT WITH TOMO: ICD-10-PCS | Mod: 26,,, | Performed by: RADIOLOGY

## 2022-11-18 PROCEDURE — 77063 BREAST TOMOSYNTHESIS BI: CPT | Mod: 26,,, | Performed by: RADIOLOGY

## 2022-11-18 PROCEDURE — 77063 MAMMO DIGITAL SCREENING BILAT WITH TOMO: ICD-10-PCS | Mod: 26,,, | Performed by: RADIOLOGY

## 2022-11-23 ENCOUNTER — OFFICE VISIT (OUTPATIENT)
Dept: UROGYNECOLOGY | Facility: CLINIC | Age: 62
End: 2022-11-23
Payer: COMMERCIAL

## 2022-11-23 VITALS
DIASTOLIC BLOOD PRESSURE: 90 MMHG | SYSTOLIC BLOOD PRESSURE: 160 MMHG | WEIGHT: 190.94 LBS | BODY MASS INDEX: 33.83 KG/M2 | HEIGHT: 63 IN

## 2022-11-23 DIAGNOSIS — N95.2 VAGINAL ATROPHY: ICD-10-CM

## 2022-11-23 DIAGNOSIS — N81.10 PROLAPSE OF ANTERIOR VAGINAL WALL: ICD-10-CM

## 2022-11-23 DIAGNOSIS — K59.00 CONSTIPATION, UNSPECIFIED CONSTIPATION TYPE: ICD-10-CM

## 2022-11-23 DIAGNOSIS — N81.2 UTEROVAGINAL PROLAPSE, INCOMPLETE: ICD-10-CM

## 2022-11-23 DIAGNOSIS — R39.15 URINARY URGENCY: Primary | ICD-10-CM

## 2022-11-23 PROCEDURE — 3008F BODY MASS INDEX DOCD: CPT | Mod: CPTII,S$GLB,, | Performed by: NURSE PRACTITIONER

## 2022-11-23 PROCEDURE — 1160F RVW MEDS BY RX/DR IN RCRD: CPT | Mod: CPTII,S$GLB,, | Performed by: NURSE PRACTITIONER

## 2022-11-23 PROCEDURE — 3044F PR MOST RECENT HEMOGLOBIN A1C LEVEL <7.0%: ICD-10-PCS | Mod: CPTII,S$GLB,, | Performed by: NURSE PRACTITIONER

## 2022-11-23 PROCEDURE — 3080F DIAST BP >= 90 MM HG: CPT | Mod: CPTII,S$GLB,, | Performed by: NURSE PRACTITIONER

## 2022-11-23 PROCEDURE — 1159F MED LIST DOCD IN RCRD: CPT | Mod: CPTII,S$GLB,, | Performed by: NURSE PRACTITIONER

## 2022-11-23 PROCEDURE — 3044F HG A1C LEVEL LT 7.0%: CPT | Mod: CPTII,S$GLB,, | Performed by: NURSE PRACTITIONER

## 2022-11-23 PROCEDURE — 1160F PR REVIEW ALL MEDS BY PRESCRIBER/CLIN PHARMACIST DOCUMENTED: ICD-10-PCS | Mod: CPTII,S$GLB,, | Performed by: NURSE PRACTITIONER

## 2022-11-23 PROCEDURE — 3008F PR BODY MASS INDEX (BMI) DOCUMENTED: ICD-10-PCS | Mod: CPTII,S$GLB,, | Performed by: NURSE PRACTITIONER

## 2022-11-23 PROCEDURE — 3077F PR MOST RECENT SYSTOLIC BLOOD PRESSURE >= 140 MM HG: ICD-10-PCS | Mod: CPTII,S$GLB,, | Performed by: NURSE PRACTITIONER

## 2022-11-23 PROCEDURE — 99213 OFFICE O/P EST LOW 20 MIN: CPT | Mod: 25,S$GLB,, | Performed by: NURSE PRACTITIONER

## 2022-11-23 PROCEDURE — 99999 PR PBB SHADOW E&M-EST. PATIENT-LVL III: CPT | Mod: PBBFAC,,, | Performed by: NURSE PRACTITIONER

## 2022-11-23 PROCEDURE — 1159F PR MEDICATION LIST DOCUMENTED IN MEDICAL RECORD: ICD-10-PCS | Mod: CPTII,S$GLB,, | Performed by: NURSE PRACTITIONER

## 2022-11-23 PROCEDURE — 57160 INSERT PESSARY/OTHER DEVICE: CPT | Mod: S$GLB,,, | Performed by: NURSE PRACTITIONER

## 2022-11-23 PROCEDURE — 99999 PR PBB SHADOW E&M-EST. PATIENT-LVL III: ICD-10-PCS | Mod: PBBFAC,,, | Performed by: NURSE PRACTITIONER

## 2022-11-23 PROCEDURE — 99213 PR OFFICE/OUTPT VISIT, EST, LEVL III, 20-29 MIN: ICD-10-PCS | Mod: 25,S$GLB,, | Performed by: NURSE PRACTITIONER

## 2022-11-23 PROCEDURE — 3080F PR MOST RECENT DIASTOLIC BLOOD PRESSURE >= 90 MM HG: ICD-10-PCS | Mod: CPTII,S$GLB,, | Performed by: NURSE PRACTITIONER

## 2022-11-23 PROCEDURE — 57160 PR FIT/INSERT INTRAVAG SUPPORT DEVICE: ICD-10-PCS | Mod: S$GLB,,, | Performed by: NURSE PRACTITIONER

## 2022-11-23 PROCEDURE — 3077F SYST BP >= 140 MM HG: CPT | Mod: CPTII,S$GLB,, | Performed by: NURSE PRACTITIONER

## 2022-11-23 NOTE — PROGRESS NOTES
Urogyn follow up  11/23/2022  .  [facility]  [address] [id number]    Irene Lawton  [id number]  1960      Irene Lawton is a 62 y.o. here for a urogyn follow up for urinary frequency for pessary fitting    Last HPI from 06/23/2022  Chief Complaint:  Urinary Frequency     History of Present Illness  HPI 62 Y O F  has a past medical history of ALLERGIC RHINITIS, Anemia, Anxiety, Cancer, Hypertension, and Transient elevated blood pressure.  Referred by Dr. Laguerre  for evaluation of urinary frequency and urgency.          [facility]     Question 6/22/2022 10:59 AM CDT - Filed by Patient   General Urogynecology: Are you experiencing the following?     Dysuria (painful urination) No   Nocturia:  waking up at night to empty your bladder  Yes   If you answered yes to the previous question, how many times does this happen per night? 1-2   Enuresis (urine loss during sleep) No   Dribbling urine after you urinate No   Hematuria (urine appears red) No   Type of stream Sprays   Urinary frequency: How often a day are you going to the bathroom per day?  Less than 10   Urinary Tract Infections: How many Urinary Tract Infections have you had in the past year? I have not had a UTI in the past year   If you have had a UTI in the past year, what treatments have you had so far?  I have not had a UTI in the past year   Urinary Incontinence (General): Are you experiencing the following?     Past consultation for incontinence: Have you ever seen someone for the evaluation of incontinence? No   If you answered yes to the previous question, please select all the therapies you have tried.  None of the above   Please note the effectiveness of the therapies. Ineffective   Need to wear protection to keep clothes dry  Yes   If you answered yes to the previous question, please shabana the protection you use.  Panty liner   If you wear protection, how much wetness is typically on each  "pad? Light   If you wear protection, how often do you have to change per day, if applicable?  1   Stress Symptoms: Are you experiencing the following?     Leakage of urine with cough, laugh and/or sneeze No   If you answered yes to the previous question, what is the frequency in days, weeks and/or months? Never   Leakage of urine with sex No   Leakage of urine with bending/ lifting No   Leakage of urine with briskly walking or jogging No   If you lose urine for any other reason not previously mentioned, please note it below, if applicable.      Urge Symptoms: Are you experiencing the following?     Urgency ("got to go" feeling) No   Urge: How frequently do you feel an urge to urinate (feeling like you "gotta go" to the bathroom and can't wait) Never   Do you experience a leakage of urine when you have a feeling of urgency?  Yes   Leakage of urine when unaware No   Past use of anticholinergics (medications used to treat overactive bladder) No   If you answered yes to the previous question, please shabana the anticholinergics you have used:      Have you ever used Mirbetriq (aka Mirabegron)?  No   Prolapse Symptoms: Are you experiencing any of the following?      Falling out/ Bulging/ Heaviness in the vagina Yes   Vaginal/ Abdominal Pain/ Pressure No   Need to strain/ Push to void No   Need to wait on the toilet before you void No   Unusual position to urinate (using your hands to push back the vaginal bulge) No   Sensation of incomplete emptying Yes   Past use of pessary device No   If you answered yes to the previous question, please list the devices you have used below.      Bowel Symptoms: Are you experiencing any of the following?     Constipation Yes   Diarrhea  No   Hematochezia (bloody stool) No   Incomplete evacuation of stool Yes   Involuntary loss of formed stool No   Fecal smearing/urgency No   Involuntary loss of gas Yes   Vaginal Symptoms: Are you experiencing any of the following?      Abnormal vaginal " bleeding  No   Vaginal dryness No   Sexually active  No   Dyspareunia (painful intercourse) No   Estrogen use  No          Changes from last visit:  1. Urge incontinence, rare  --took vesicare 5 mg intermittently  --voiding every hour-2 hours  --nocturia 1/night  --Denies UI unless she ignores urger     2. Prolapse: Stage 2 apical prolapse, Stage 2 anterior and posterior vaginal wall prolapse   --does not feel like prolapse has worsened     3. Constipation:  --denies constipation  --doing intermittent fasting  --eating fiber one     4. Vaginal atrophy (dryness):    --not using vaginal estrogen cream    Past Medical History:   Diagnosis Date    ALLERGIC RHINITIS     Anemia     Anxiety     Cancer     right neck    Hypertension     Transient elevated blood pressure        Past Surgical History:   Procedure Laterality Date    COLONOSCOPY N/A 12/12/2020    Procedure: COLONOSCOPY;  Surgeon: GHADA Gamez MD;  Location: [address]);  Service: Endoscopy;  Laterality: N/A;  Pt reports family Hx colon cancer (Pt's father )  prep ins. emailed - COVID screening on 12/9/20 [facility]    Surgical removal neck cancer Right     TUBAL LIGATION         Family History   Problem Relation Age of Onset    Hypertension Mother     Breast cancer Mother     Colon cancer Neg Hx     Ovarian cancer Neg Hx        Social History     Socioeconomic History    Marital status: Single    Number of children: 2    Years of education: 14 years   Occupational History    Occupation: [employer] manager     Employer: [id number]   Tobacco Use    Smoking status: Never    Smokeless tobacco: Never   Substance and Sexual Activity    Alcohol use: Yes     Comment: Social    Drug use: No    Sexual activity: Yes     Partners: Male     Birth control/protection: Surgical   Social History Narrative    Works at [employer] and does not exercise regularly       Current Outpatient Medications   Medication Sig Dispense Refill    conjugated estrogens  "(PREMARIN) vaginal cream 1 g with applicator or dime-sized amt with finger in vagina nightly x 2 weeks, then twice a week thereafter 45 g 12    fluticasone propionate (FLONASE) 50 mcg/actuation nasal spray Use 1 spray(s) in each nostril once daily 16 g 0    ibuprofen (ADVIL,MOTRIN) 800 MG tablet Take 1 tablet (800 mg total) by mouth 3 (three) times daily as needed for Pain. 90 tablet 0    MULTIVITAMIN ORAL Take by mouth.      solifenacin (VESICARE) 5 MG tablet Take 1 tablet (5 mg total) by mouth once daily. 30 tablet 11     No current facility-administered medications for this visit.       Review of patient's allergies indicates:   Allergen Reactions    No known allergies        Well woman:  Pap:06/2021 normal HPV negative  Mammo:10/2021 normal  Colonoscopy:12/2020 polyps  Dexa:none on file    ROS:  As per HPI.      Exam  BP (!) 160/90 (BP Location: Right arm, Patient Position: Sitting, BP Method: Medium (Manual))   Ht 5' 3" (1.6 m)   Wt 86.6 kg (190 lb 14.7 oz)   LMP 06/27/2012   BMI 33.82 kg/m²   General: alert and oriented, no acute distress  Respiratory: normal respiratory effort  Abd: soft, non-tender, non-distended    Pelvic  Ext. Genitalia: normal external genitalia. Normal bartholin's and skeens glands  Vagina: + atrophy. Normal vaginal mucosa without lesions. No discharge noted.   Non-tender bladder base without palpable mass.  Cervix: no lesions  Uterus:  uterus is normal size, shape, consistency and nontender   Urethra: no masses or tenderness  Urethral meatus: no lesions, caruncle or prolapse.    The patient was fit with #5 ring with support pessary.  She was able to tolerate the device comfortabley with bending, squatting, valsalva.  She was able to demonstrate independent removal and placement.  Pessary removed without difficulty. Will order form [employer] to be delivered to your house. She tolerated the procedure well.       Impression  1. Urinary urgency        2. Uterovaginal prolapse, incomplete "        3. Prolapse of anterior vaginal wall        4. Constipation, unspecified constipation type        5. Vaginal atrophy            We reviewed the above issues and discussed options for short-term versus long-term management of her problems.   Plan:   1. Urge incontinence, rare  --Bladder diary for 3-7 days, write the number of times you go to the rest room and associated symptoms of urgency or leakage.   --Empty bladder every 3 hours.  Empty well: wait a minute, lean forward on toilet.    --Avoid dietary irritants (see sheet).  Keep diary x 3-5 days to determine your irritants.  --KEGELS: do 10 in AM and 10 in PM, holding each x 10 seconds.  When you feel urge to go, STOP, KEGEL, and when urge has passed, then go to bathroom.   PT in future.    --URGE: hold  vesicare 5 mg for now     2. Prolapse: Stage 2 apical prolapse, Stage 2 anterior and posterior vaginal wall prolapse   --fit with # 5 ring with support pessary --- [facility] to your house  --PESSARY CARE: Remove pessary as frequently as nightly and as infrequently as every 2-3 weeks.  Remove before intercourse.  May need to remove before bowel movements if straining dislodges.   Wash with soap and water (no ).  Replace using small amount of water-based lubricant (like KY jelly) at end being introduced into vagina.     --PESSARY CARE: Remove pessary as frequently as nightly and as infrequently as every 2-3 weeks.  Remove before intercourse.  May need to remove before bowel movements if straining dislodges.   Wash with soap and water (no ).  Replace using small amount of water-based lubricant (like KY jelly) at end being introduced into vagina.     --Daily pelvic floor exercises as assigned,  Pelvic floor PT   --Non surgical options discussed with patient    --Surgical options discussed such as : Hysterectomy (vaginal or laparoscopic)  with apical suspension: SSF, USLS and SCP with mesh augmentation. We also  discussed the pro's and con's of hysteropexy.  Risks/ benefits/ alternatives/ succuss and failure rates were reviewed. Information packets were given detailing surgical options.  She was also given web site information for: www.augs.org and www.SWYFsforpfd.org and http://www.fda.gov/MedicalDevices/UroGynSurgicalMesh/default.htm to review the details of the surgical options discussed and the use of mesh in surgery. She will review the information given and we will discuss further at the follow up visit. She would like to avoid surgery therefore will continue with pessary fitting and pelvic floor PT.      3. Constipation:  Controlling constipation may help bladder urgency/leakage and fiber may better control cholesterol and blood glucose.  Start daily fiber (fibercon).  Take 1 tsp of fiber powder (psyllium or other sugar-free powder).  Mix in 8 oz of water.  Take x 3-5 days.  Then, increase fiber by 1 tsp every 3-5 days until stool is easy to pass.  Stop and continue at that dose.   Do not exceed 6 tsps/day.  May also use over the counter stool softener 1-2 x/day.  AVOID laxatives.     4. Vaginal atrophy (dryness):  Use 1 gram of estrogen cream in vagina  twice a week     5. RTC for pessary check    I spent a total of 20 minutes on the day of the visit.  This includes face to face time and non-face to face time preparing to see the patient (eg, review of tests), obtaining and/or reviewing separately obtained history, documenting clinical information in the electronic or other health record, independently interpreting results and communicating results to the patient/family/caregiver, or care coordinator.       Anupama Mai, FNP-BC [facility]  Division of Female Pelvic Medicine and Reconstructive Surgery  Department of Obstetrics & Gynecology

## 2022-11-23 NOTE — PATIENT INSTRUCTIONS
1. Urge incontinence, rare  --Bladder diary for 3-7 days, write the number of times you go to the rest room and associated symptoms of urgency or leakage.   --Empty bladder every 3 hours.  Empty well: wait a minute, lean forward on toilet.    --Avoid dietary irritants (see sheet).  Keep diary x 3-5 days to determine your irritants.  --KEGELS: do 10 in AM and 10 in PM, holding each x 10 seconds.  When you feel urge to go, STOP, KEGEL, and when urge has passed, then go to bathroom.   PT in future.    --URGE: hold  vesicare 5 mg for now     2. Prolapse: Stage 2 apical prolapse, Stage 2 anterior and posterior vaginal wall prolapse   --fit with # 5 ring with support pessary --- [facility]  --PESSARY CARE: Remove pessary as frequently as nightly and as infrequently as every 2-3 weeks.  Remove before intercourse.  May need to remove before bowel movements if straining dislodges.   Wash with soap and water (no ).  Replace using small amount of water-based lubricant (like KY jelly) at end being introduced into vagina.     --PESSARY CARE: Remove pessary as frequently as nightly and as infrequently as every 2-3 weeks.  Remove before intercourse.  May need to remove before bowel movements if straining dislodges.   Wash with soap and water (no ).  Replace using small amount of water-based lubricant (like KY jelly) at end being introduced into vagina.     --Daily pelvic floor exercises as assigned,  Pelvic floor PT   --Non surgical options discussed with patient    --Surgical options discussed such as : Hysterectomy (vaginal or laparoscopic)  with apical suspension: SSF, USLS and SCP with mesh augmentation. We also discussed the pro's and con's of hysteropexy.  Risks/ benefits/ alternatives/ succuss and failure rates were reviewed. Information packets were given detailing surgical options.  She was also given web site information for: www.augs.org and  www.voicesforpfd.org and http://www.fda.gov/MedicalDevices/UroGynSurgicalMesh/default.htm to review the details of the surgical options discussed and the use of mesh in surgery. She will review the information given and we will discuss further at the follow up visit. She would like to avoid surgery therefore will continue with pessary fitting and pelvic floor PT.      3. Constipation:  Controlling constipation may help bladder urgency/leakage and fiber may better control cholesterol and blood glucose.  Start daily fiber (fibercon).  Take 1 tsp of fiber powder (psyllium or other sugar-free powder).  Mix in 8 oz of water.  Take x 3-5 days.  Then, increase fiber by 1 tsp every 3-5 days until stool is easy to pass.  Stop and continue at that dose.   Do not exceed 6 tsps/day.  May also use over the counter stool softener 1-2 x/day.  AVOID laxatives.     4. Vaginal atrophy (dryness):  Use 1 gram of estrogen cream in vagina  twice a week     5. RTC for pessary check

## 2023-01-12 ENCOUNTER — OFFICE VISIT (OUTPATIENT)
Dept: UROGYNECOLOGY | Facility: CLINIC | Age: 63
End: 2023-01-12
Payer: COMMERCIAL

## 2023-01-12 VITALS
SYSTOLIC BLOOD PRESSURE: 140 MMHG | BODY MASS INDEX: 33.83 KG/M2 | WEIGHT: 190.94 LBS | DIASTOLIC BLOOD PRESSURE: 80 MMHG | HEIGHT: 63 IN

## 2023-01-12 DIAGNOSIS — N81.10 PROLAPSE OF ANTERIOR VAGINAL WALL: ICD-10-CM

## 2023-01-12 DIAGNOSIS — N95.2 VAGINAL ATROPHY: ICD-10-CM

## 2023-01-12 DIAGNOSIS — N81.2 UTEROVAGINAL PROLAPSE, INCOMPLETE: Primary | ICD-10-CM

## 2023-01-12 DIAGNOSIS — Z46.89 PESSARY MAINTENANCE: ICD-10-CM

## 2023-01-12 DIAGNOSIS — K59.00 CONSTIPATION, UNSPECIFIED CONSTIPATION TYPE: ICD-10-CM

## 2023-01-12 PROCEDURE — 99213 OFFICE O/P EST LOW 20 MIN: CPT | Mod: S$GLB,,, | Performed by: NURSE PRACTITIONER

## 2023-01-12 PROCEDURE — 1160F RVW MEDS BY RX/DR IN RCRD: CPT | Mod: CPTII,S$GLB,, | Performed by: NURSE PRACTITIONER

## 2023-01-12 PROCEDURE — 99213 PR OFFICE/OUTPT VISIT, EST, LEVL III, 20-29 MIN: ICD-10-PCS | Mod: S$GLB,,, | Performed by: NURSE PRACTITIONER

## 2023-01-12 PROCEDURE — 99999 PR PBB SHADOW E&M-EST. PATIENT-LVL III: ICD-10-PCS | Mod: PBBFAC,,, | Performed by: NURSE PRACTITIONER

## 2023-01-12 PROCEDURE — 3079F DIAST BP 80-89 MM HG: CPT | Mod: CPTII,S$GLB,, | Performed by: NURSE PRACTITIONER

## 2023-01-12 PROCEDURE — 1160F PR REVIEW ALL MEDS BY PRESCRIBER/CLIN PHARMACIST DOCUMENTED: ICD-10-PCS | Mod: CPTII,S$GLB,, | Performed by: NURSE PRACTITIONER

## 2023-01-12 PROCEDURE — 3077F PR MOST RECENT SYSTOLIC BLOOD PRESSURE >= 140 MM HG: ICD-10-PCS | Mod: CPTII,S$GLB,, | Performed by: NURSE PRACTITIONER

## 2023-01-12 PROCEDURE — 3008F BODY MASS INDEX DOCD: CPT | Mod: CPTII,S$GLB,, | Performed by: NURSE PRACTITIONER

## 2023-01-12 PROCEDURE — 3077F SYST BP >= 140 MM HG: CPT | Mod: CPTII,S$GLB,, | Performed by: NURSE PRACTITIONER

## 2023-01-12 PROCEDURE — 3008F PR BODY MASS INDEX (BMI) DOCUMENTED: ICD-10-PCS | Mod: CPTII,S$GLB,, | Performed by: NURSE PRACTITIONER

## 2023-01-12 PROCEDURE — 1159F PR MEDICATION LIST DOCUMENTED IN MEDICAL RECORD: ICD-10-PCS | Mod: CPTII,S$GLB,, | Performed by: NURSE PRACTITIONER

## 2023-01-12 PROCEDURE — 3079F PR MOST RECENT DIASTOLIC BLOOD PRESSURE 80-89 MM HG: ICD-10-PCS | Mod: CPTII,S$GLB,, | Performed by: NURSE PRACTITIONER

## 2023-01-12 PROCEDURE — 1159F MED LIST DOCD IN RCRD: CPT | Mod: CPTII,S$GLB,, | Performed by: NURSE PRACTITIONER

## 2023-01-12 PROCEDURE — 99999 PR PBB SHADOW E&M-EST. PATIENT-LVL III: CPT | Mod: PBBFAC,,, | Performed by: NURSE PRACTITIONER

## 2023-01-12 NOTE — PATIENT INSTRUCTIONS
1. Urge incontinence, rare  --Empty bladder every 3 hours.  Empty well: wait a minute, lean forward on toilet.    --Avoid dietary irritants (see sheet).  Keep diary x 3-5 days to determine your irritants.  --KEGELS: do 10 in AM and 10 in PM, holding each x 10 seconds.  When you feel urge to go, STOP, KEGEL, and when urge has passed, then go to bathroom.   PT in future.    --URGE: hold  vesicare 5 mg for now     2. Prolapse: Stage 2 apical prolapse, Stage 2 anterior and posterior vaginal wall prolapse   --continue # 5 ring with support pessary  --PESSARY CARE: Remove pessary as frequently as nightly and as infrequently as every 2-3 weeks.  Remove before intercourse.  May need to remove before bowel movements if straining dislodges.   Wash with soap and water (no ).  Replace using small amount of water-based lubricant (like KY jelly) at end being introduced into vagina.     --PESSARY CARE: Remove pessary as frequently as nightly and as infrequently as every 2-3 weeks.  Remove before intercourse.  May need to remove before bowel movements if straining dislodges.   Wash with soap and water (no ).  Replace using small amount of water-based lubricant (like KY jelly) at end being introduced into vagina.     --Daily pelvic floor exercises as assigned,  Pelvic floor PT   --Non surgical options discussed with patient    --Surgical options discussed such as : Hysterectomy (vaginal or laparoscopic)  with apical suspension: SSF, USLS and SCP with mesh augmentation. We also discussed the pro's and con's of hysteropexy.  Risks/ benefits/ alternatives/ succuss and failure rates were reviewed. Information packets were given detailing surgical options.  She was also given web site information for: www.augs.org and www.Diasporasforpfd.org and http://www.fda.gov/MedicalDevices/UroGynSurgicalMesh/default.htm to review the details of the surgical options discussed and the use of mesh in surgery. She will review the  information given and we will discuss further at the follow up visit. She would like to avoid surgery therefore will continue with pessary fitting and pelvic floor PT.      3. Constipation:  --continue fiber supplement.     4. Vaginal atrophy (dryness):  Use 1 gram of estrogen cream in vagina  twice a week     5. RTC for pessary check in 6 months

## 2023-01-12 NOTE — PROGRESS NOTES
Urogyn follow up  01/12/2023  .  [facility]  [address] [id number]    Irene Lawton  [id number]  1960      Irene Lawton is a 62 y.o. here for a urogyn follow up for urinary frequency for pessary check    Last HPI from 06/23/2022  Chief Complaint:  Urinary Frequency     History of Present Illness  HPI 62 Y O F  has a past medical history of ALLERGIC RHINITIS, Anemia, Anxiety, Cancer, Hypertension, and Transient elevated blood pressure.  Referred by Dr. Laguerre  for evaluation of urinary frequency and urgency.          [facility]     Question 6/22/2022 10:59 AM CDT - Filed by Patient   General Urogynecology: Are you experiencing the following?     Dysuria (painful urination) No   Nocturia:  waking up at night to empty your bladder  Yes   If you answered yes to the previous question, how many times does this happen per night? 1-2   Enuresis (urine loss during sleep) No   Dribbling urine after you urinate No   Hematuria (urine appears red) No   Type of stream Sprays   Urinary frequency: How often a day are you going to the bathroom per day?  Less than 10   Urinary Tract Infections: How many Urinary Tract Infections have you had in the past year? I have not had a UTI in the past year   If you have had a UTI in the past year, what treatments have you had so far?  I have not had a UTI in the past year   Urinary Incontinence (General): Are you experiencing the following?     Past consultation for incontinence: Have you ever seen someone for the evaluation of incontinence? No   If you answered yes to the previous question, please select all the therapies you have tried.  None of the above   Please note the effectiveness of the therapies. Ineffective   Need to wear protection to keep clothes dry  Yes   If you answered yes to the previous question, please shabana the protection you use.  Panty liner   If you wear protection, how much wetness is typically on each  "pad? Light   If you wear protection, how often do you have to change per day, if applicable?  1   Stress Symptoms: Are you experiencing the following?     Leakage of urine with cough, laugh and/or sneeze No   If you answered yes to the previous question, what is the frequency in days, weeks and/or months? Never   Leakage of urine with sex No   Leakage of urine with bending/ lifting No   Leakage of urine with briskly walking or jogging No   If you lose urine for any other reason not previously mentioned, please note it below, if applicable.      Urge Symptoms: Are you experiencing the following?     Urgency ("got to go" feeling) No   Urge: How frequently do you feel an urge to urinate (feeling like you "gotta go" to the bathroom and can't wait) Never   Do you experience a leakage of urine when you have a feeling of urgency?  Yes   Leakage of urine when unaware No   Past use of anticholinergics (medications used to treat overactive bladder) No   If you answered yes to the previous question, please shabana the anticholinergics you have used:      Have you ever used Mirbetriq (aka Mirabegron)?  No   Prolapse Symptoms: Are you experiencing any of the following?      Falling out/ Bulging/ Heaviness in the vagina Yes   Vaginal/ Abdominal Pain/ Pressure No   Need to strain/ Push to void No   Need to wait on the toilet before you void No   Unusual position to urinate (using your hands to push back the vaginal bulge) No   Sensation of incomplete emptying Yes   Past use of pessary device No   If you answered yes to the previous question, please list the devices you have used below.      Bowel Symptoms: Are you experiencing any of the following?     Constipation Yes   Diarrhea  No   Hematochezia (bloody stool) No   Incomplete evacuation of stool Yes   Involuntary loss of formed stool No   Fecal smearing/urgency No   Involuntary loss of gas Yes   Vaginal Symptoms: Are you experiencing any of the following?      Abnormal vaginal " bleeding  No   Vaginal dryness No   Sexually active  No   Dyspareunia (painful intercourse) No   Estrogen use  No        11/23/2022  1. Urge incontinence, rare  --took vesicare 5 mg intermittently  --voiding every hour-2 hours  --nocturia 1/night  --Denies UI unless she ignores urger     2. Prolapse: Stage 2 apical prolapse, Stage 2 anterior and posterior vaginal wall prolapse   --does not feel like prolapse has worsened     3. Constipation:  --denies constipation  --doing intermittent fasting  --eating fiber one     4. Vaginal atrophy (dryness):    --not using vaginal estrogen cream    Changes since last visit:  1. Urge incontinence, rare  --has improved with pessary  --voiding every 3 hours during the day  --only 1/ night     2. Prolapse: Stage 2 apical prolapse, Stage 2 anterior and posterior vaginal wall prolapse   --using #5 ring with support   --denies pain, bleeding, or discharge     3. Constipation:  --denies constipation  --taking fiber and has increased hydration     4. Vaginal atrophy (dryness):    --using vaginal estrogen cream twice weekly          Past Medical History:   Diagnosis Date    ALLERGIC RHINITIS     Anemia     Anxiety     Cancer     right neck    Hypertension     Transient elevated blood pressure        Past Surgical History:   Procedure Laterality Date    COLONOSCOPY N/A 12/12/2020    Procedure: COLONOSCOPY;  Surgeon: GHADA Gamez MD;  Location: [address]);  Service: Endoscopy;  Laterality: N/A;  Pt reports family Hx colon cancer (Pt's father )  prep ins. emailed - COVID screening on 12/9/20 [facility]    Surgical removal neck cancer Right     TUBAL LIGATION         Family History   Problem Relation Age of Onset    Hypertension Mother     Breast cancer Mother     Colon cancer Neg Hx     Ovarian cancer Neg Hx        Social History     Socioeconomic History    Marital status: Single    Number of children: 2    Years of education: 14 years   Occupational History    Occupation:  "[employer] manager     Employer: [id number]   Tobacco Use    Smoking status: Never    Smokeless tobacco: Never   Substance and Sexual Activity    Alcohol use: Yes     Comment: Social    Drug use: No    Sexual activity: Yes     Partners: Male     Birth control/protection: Surgical   Social History Narrative    Works at [employer] and does not exercise regularly       Current Outpatient Medications   Medication Sig Dispense Refill    conjugated estrogens (PREMARIN) vaginal cream 1 g with applicator or dime-sized amt with finger in vagina nightly x 2 weeks, then twice a week thereafter 45 g 12    fluticasone propionate (FLONASE) 50 mcg/actuation nasal spray Use 1 spray(s) in each nostril once daily 16 g 0    ibuprofen (ADVIL,MOTRIN) 800 MG tablet Take 1 tablet (800 mg total) by mouth 3 (three) times daily as needed for Pain. 90 tablet 0    MULTIVITAMIN ORAL Take by mouth.      solifenacin (VESICARE) 5 MG tablet Take 1 tablet (5 mg total) by mouth once daily. 30 tablet 11     No current facility-administered medications for this visit.       Review of patient's allergies indicates:   Allergen Reactions    No known allergies        Well woman:  Pap:06/2021 normal HPV negative  Mammo:11/2022 normal  Colonoscopy:12/2020 polyps  Dexa:none on file    ROS:  As per HPI.      Exam  BP (!) 140/80 (BP Location: Left arm, Patient Position: Sitting, BP Method: Large (Manual))   Ht 5' 3" (1.6 m)   Wt 86.6 kg (190 lb 14.7 oz)   LMP 06/27/2012   BMI 33.82 kg/m²   General: alert and oriented, no acute distress  Respiratory: normal respiratory effort  Abd: soft, non-tender, non-distended    Pelvic  Ext. Genitalia: normal external genitalia. Normal bartholin's and skeens glands  Vagina: + atrophy. Normal vaginal mucosa without lesions. No discharge noted.   Non-tender bladder base without palpable mass.  #5 ring with support pessary in place  Cervix: no lesions  Uterus:  uterus is normal size, shape, consistency and nontender "   Urethra: no masses or tenderness  Urethral meatus: no lesions, caruncle or prolapse.    Pessary removed without difficulty. Washed with soap and  water. Reinserted without difficulty    Impression  1. Uterovaginal prolapse, incomplete        2. Prolapse of anterior vaginal wall        3. Constipation, unspecified constipation type        4. Vaginal atrophy        5. Pessary maintenance              We reviewed the above issues and discussed options for short-term versus long-term management of her problems.   Plan:   1. Urge incontinence, rare  --Empty bladder every 3 hours.  Empty well: wait a minute, lean forward on toilet.    --Avoid dietary irritants (see sheet).  Keep diary x 3-5 days to determine your irritants.  --KEGELS: do 10 in AM and 10 in PM, holding each x 10 seconds.  When you feel urge to go, STOP, KEGEL, and when urge has passed, then go to bathroom.   PT in future.    --URGE: hold  vesicare 5 mg for now     2. Prolapse: Stage 2 apical prolapse, Stage 2 anterior and posterior vaginal wall prolapse   --continue # 5 ring with support pessary  --PESSARY CARE: Remove pessary as frequently as nightly and as infrequently as every 2-3 weeks.  Remove before intercourse.  May need to remove before bowel movements if straining dislodges.   Wash with soap and water (no ).  Replace using small amount of water-based lubricant (like KY jelly) at end being introduced into vagina.     --PESSARY CARE: Remove pessary as frequently as nightly and as infrequently as every 2-3 weeks.  Remove before intercourse.  May need to remove before bowel movements if straining dislodges.   Wash with soap and water (no ).  Replace using small amount of water-based lubricant (like KY jelly) at end being introduced into vagina.     --Daily pelvic floor exercises as assigned,  Pelvic floor PT   --Non surgical options discussed with patient    --Surgical options discussed such as : Hysterectomy (vaginal or  laparoscopic)  with apical suspension: SSF, USLS and SCP with mesh augmentation. We also discussed the pro's and con's of hysteropexy.  Risks/ benefits/ alternatives/ succuss and failure rates were reviewed. Information packets were given detailing surgical options.  She was also given web site information for: www.augs.org and www.[employer].org and http://www.fda.gov/MedicalDevices/UroGynSurgicalMesh/default.htm to review the details of the surgical options discussed and the use of mesh in surgery. She will review the information given and we will discuss further at the follow up visit. She would like to avoid surgery therefore will continue with pessary fitting and pelvic floor PT.      3. Constipation:  --continue fiber supplement.     4. Vaginal atrophy (dryness):  Use 1 gram of estrogen cream in vagina  twice a week     5. RTC for pessary check in 6 months    I spent a total of 20 minutes on the day of the visit.    This includes face to face time and non-face to face time preparing to see the patient (eg, review of tests), obtaining and/or reviewing separately obtained history, documenting clinical information in the electronic or other health record, independently interpreting results and communicating results to the patient/family/caregiver, or care coordinator.       Anupama Mai, [id number]  [facility]  Division of Female Pelvic Medicine and Reconstructive Surgery  Department of Obstetrics & Gynecology

## 2023-01-17 RX ORDER — FLUTICASONE PROPIONATE 50 MCG
SPRAY, SUSPENSION (ML) NASAL
Qty: 16 G | Refills: 0 | Status: SHIPPED | OUTPATIENT
Start: 2023-01-17 | End: 2023-03-14 | Stop reason: SDUPTHER

## 2023-02-10 RX ORDER — IBUPROFEN 800 MG/1
800 TABLET ORAL 3 TIMES DAILY PRN
Qty: 90 TABLET | Refills: 0 | Status: SHIPPED | OUTPATIENT
Start: 2023-02-10 | End: 2023-05-10

## 2023-03-14 ENCOUNTER — OFFICE VISIT (OUTPATIENT)
Dept: INTERNAL MEDICINE | Facility: CLINIC | Age: 63
End: 2023-03-14
Payer: COMMERCIAL

## 2023-03-14 VITALS
HEIGHT: 63 IN | HEART RATE: 59 BPM | RESPIRATION RATE: 19 BRPM | WEIGHT: 189.5 LBS | TEMPERATURE: 97 F | BODY MASS INDEX: 33.58 KG/M2 | DIASTOLIC BLOOD PRESSURE: 88 MMHG | SYSTOLIC BLOOD PRESSURE: 138 MMHG | OXYGEN SATURATION: 96 %

## 2023-03-14 DIAGNOSIS — R43.0 ANOSMIA: ICD-10-CM

## 2023-03-14 DIAGNOSIS — Z00.00 ROUTINE MEDICAL EXAM: Primary | ICD-10-CM

## 2023-03-14 PROCEDURE — 99999 PR PBB SHADOW E&M-EST. PATIENT-LVL V: ICD-10-PCS | Mod: PBBFAC,,, | Performed by: INTERNAL MEDICINE

## 2023-03-14 PROCEDURE — 3075F PR MOST RECENT SYSTOLIC BLOOD PRESS GE 130-139MM HG: ICD-10-PCS | Mod: CPTII,S$GLB,, | Performed by: INTERNAL MEDICINE

## 2023-03-14 PROCEDURE — 3079F DIAST BP 80-89 MM HG: CPT | Mod: CPTII,S$GLB,, | Performed by: INTERNAL MEDICINE

## 2023-03-14 PROCEDURE — 3044F PR MOST RECENT HEMOGLOBIN A1C LEVEL <7.0%: ICD-10-PCS | Mod: CPTII,S$GLB,, | Performed by: INTERNAL MEDICINE

## 2023-03-14 PROCEDURE — 3079F PR MOST RECENT DIASTOLIC BLOOD PRESSURE 80-89 MM HG: ICD-10-PCS | Mod: CPTII,S$GLB,, | Performed by: INTERNAL MEDICINE

## 2023-03-14 PROCEDURE — 3008F BODY MASS INDEX DOCD: CPT | Mod: CPTII,S$GLB,, | Performed by: INTERNAL MEDICINE

## 2023-03-14 PROCEDURE — 3044F HG A1C LEVEL LT 7.0%: CPT | Mod: CPTII,S$GLB,, | Performed by: INTERNAL MEDICINE

## 2023-03-14 PROCEDURE — 99396 PR PREVENTIVE VISIT,EST,40-64: ICD-10-PCS | Mod: S$GLB,,, | Performed by: INTERNAL MEDICINE

## 2023-03-14 PROCEDURE — 99396 PREV VISIT EST AGE 40-64: CPT | Mod: S$GLB,,, | Performed by: INTERNAL MEDICINE

## 2023-03-14 PROCEDURE — 3075F SYST BP GE 130 - 139MM HG: CPT | Mod: CPTII,S$GLB,, | Performed by: INTERNAL MEDICINE

## 2023-03-14 PROCEDURE — 3008F PR BODY MASS INDEX (BMI) DOCUMENTED: ICD-10-PCS | Mod: CPTII,S$GLB,, | Performed by: INTERNAL MEDICINE

## 2023-03-14 PROCEDURE — 99999 PR PBB SHADOW E&M-EST. PATIENT-LVL V: CPT | Mod: PBBFAC,,, | Performed by: INTERNAL MEDICINE

## 2023-03-14 RX ORDER — FLUTICASONE PROPIONATE 50 MCG
1 SPRAY, SUSPENSION (ML) NASAL DAILY
Qty: 16 G | Refills: 11 | Status: SHIPPED | OUTPATIENT
Start: 2023-03-14 | End: 2024-04-01

## 2023-03-14 NOTE — PROGRESS NOTES
The patient is a 62 y.o. old female who presents to the office for a physical.    PAST MEDICAL HISTORY  Past Medical History:   Diagnosis Date    ALLERGIC RHINITIS     Anemia     Anxiety     Cancer     right neck    Hypertension     Transient elevated blood pressure        SURGICAL HISTORY:  Past Surgical History:   Procedure Laterality Date    COLONOSCOPY N/A 12/12/2020    Procedure: COLONOSCOPY;  Surgeon: GHADA Gamez MD;  Location: [address]);  Service: Endoscopy;  Laterality: N/A;  Pt reports family Hx colon cancer (Pt's father )  prep ins. emailed - COVID screening on 12/9/20 [facility]    Surgical removal neck cancer Right     TUBAL LIGATION           MEDS:  Medcard reviewed and updated    ALLERGIES: Allergy Card reviewed and updated    SOCIAL HISTORY:   The patient is a nonsmoker, denies alcohol or illicit drug use.    ROS:  GENERAL: No fever, chills, fatigability or weight loss.  SKIN: No rashes.  HEAD: No headaches or recent head trauma.  EYES: No photophobia, ocular pain or diplopia.  EARS: Denies ear pain, discharge or vertigo.  NOSE: No epistaxis or postnasal drip.  Positive anosmia.  MOUTH & THROAT: No hoarseness or change in voice.   NODES: Denies swollen glands.  CHEST: Denies shortness of breath, wheezing, cough and sputum production.  CARDIOVASCULAR: Denies chest pain or palpitations.  ABDOMEN: Appetite fine. Denies diarrhea, abdominal pain, constipation or blood in stool.  URINARY: No flank pain, dysuria or hematuria.  MUSCULOSKELETAL: No joint stiffness or swelling. Denies back pain.  NEUROLOGIC: No history of seizures.  ENDOCRINE: Denies polyuria or polydipsia.  PSYCHIATRIC: Denies mood swings, depression, anxiety, homicidal or suicidal thoughts.    SCREENINGS:  Last cholesterol:2022 .  Last colonoscopy: 2020  Last mammogram: 2022  Last Pap smear: 2021  Last tetanus: 2016  Last Pneumovax: none  Last eye exam: about 2 years ago  Last bone density: none  Last menstrual period:  postmenopausal    PE:   Vitals:  Vitals:    03/14/23 1544   BP: (!) 140/88   Pulse: (!) 59   Resp: 19   Temp: 97.2 °F (36.2 °C)       APPEARANCE: Well nourished, well developed, in no acute distress.    EYES: Sclerae anicteric. PERRL. EOMI.      EARS: TM's intact. No retraction or perforation.    NOSE: Mucosa pink. Airway clear.  MOUTH & THROAT: No tonsillar enlargement. No pharyngeal erythema or exudate. No stridor.  NECK: Supple, no thyromegaly.  CHEST: Lungs clear to auscultation with unlabored respirations.  CARDIOVASCULAR: Normal S1, S2. No murmurs. No carotid bruits. No pedal edema.  ABDOMEN: Bowel sounds normal. Not distended. Soft. No tenderness or masses.   MUSCULOSKELETAL:  Normal gait, no cyanosis or clubbing.    SKIN: Normal skin turgor, warm and dry.  NEUROLOGIC: Cranial Nerves: Intact.  PSYCHIATRIC: The patient is oriented to person, place, and time and has a pleasant affect.        ASSESSMENT/PLAN:  Irene was seen today for annual exam.    Diagnoses and all orders for this visit:    Routine medical exam  -     CBC Auto Differential; Future  -     Comprehensive Metabolic Panel; Future  -     Lipid Panel; Future  -     TSH; Future  -     Hemoglobin A1C; Future  -     Urinalysis; Future    Anosmia  -     Ambulatory referral/consult to ENT; Future    Other orders  -     fluticasone propionate (FLONASE) 50 mcg/actuation nasal spray; 1 spray (50 mcg total) by Each Nostril route once daily.            Answers submitted by the patient for this visit:  Review of Systems Questionnaire (Submitted on 3/13/2023)  activity change: No  unexpected weight change: No  neck pain: No  hearing loss: No  rhinorrhea: No  trouble swallowing: No  eye discharge: No  visual disturbance: No  chest tightness: No  wheezing: No  chest pain: No  palpitations: No  blood in stool: No  constipation: No  vomiting: No  diarrhea: No  polydipsia: No  polyuria: No  difficulty urinating: No  hematuria: No  menstrual problem:  No  dysuria: No  joint swelling: No  arthralgias: No  headaches: No  weakness: No  confusion: No  dysphoric mood: No

## 2023-03-15 ENCOUNTER — LAB VISIT (OUTPATIENT)
Dept: LAB | Facility: HOSPITAL | Age: 63
End: 2023-03-15
Attending: INTERNAL MEDICINE
Payer: COMMERCIAL

## 2023-03-15 DIAGNOSIS — Z00.00 ROUTINE MEDICAL EXAM: ICD-10-CM

## 2023-03-15 LAB
BILIRUB UR QL STRIP: NEGATIVE
CLARITY UR REFRACT.AUTO: CLEAR
COLOR UR AUTO: COLORLESS
GLUCOSE UR QL STRIP: NEGATIVE
HGB UR QL STRIP: ABNORMAL
KETONES UR QL STRIP: NEGATIVE
LEUKOCYTE ESTERASE UR QL STRIP: NEGATIVE
NITRITE UR QL STRIP: NEGATIVE
PH UR STRIP: 7 [PH] (ref 5–8)
PROT UR QL STRIP: NEGATIVE
SP GR UR STRIP: 1.01 (ref 1–1.03)
URN SPEC COLLECT METH UR: ABNORMAL

## 2023-03-15 PROCEDURE — 81003 URINALYSIS AUTO W/O SCOPE: CPT | Performed by: INTERNAL MEDICINE

## 2023-03-16 ENCOUNTER — OFFICE VISIT (OUTPATIENT)
Dept: OTOLARYNGOLOGY | Facility: CLINIC | Age: 63
End: 2023-03-16
Payer: COMMERCIAL

## 2023-03-16 VITALS — BODY MASS INDEX: 33.39 KG/M2 | WEIGHT: 188.5 LBS

## 2023-03-16 DIAGNOSIS — R43.9 DYSOSMIA: Primary | ICD-10-CM

## 2023-03-16 DIAGNOSIS — J34.2 DEVIATED NASAL SEPTUM: ICD-10-CM

## 2023-03-16 PROCEDURE — 31231 NASAL/SINUS ENDOSCOPY: ICD-10-PCS | Mod: S$GLB,,, | Performed by: PHYSICIAN ASSISTANT

## 2023-03-16 PROCEDURE — 3044F HG A1C LEVEL LT 7.0%: CPT | Mod: CPTII,S$GLB,, | Performed by: PHYSICIAN ASSISTANT

## 2023-03-16 PROCEDURE — 1159F PR MEDICATION LIST DOCUMENTED IN MEDICAL RECORD: ICD-10-PCS | Mod: CPTII,S$GLB,, | Performed by: PHYSICIAN ASSISTANT

## 2023-03-16 PROCEDURE — 99999 PR PBB SHADOW E&M-EST. PATIENT-LVL III: CPT | Mod: PBBFAC,,, | Performed by: PHYSICIAN ASSISTANT

## 2023-03-16 PROCEDURE — 3008F BODY MASS INDEX DOCD: CPT | Mod: CPTII,S$GLB,, | Performed by: PHYSICIAN ASSISTANT

## 2023-03-16 PROCEDURE — 1159F MED LIST DOCD IN RCRD: CPT | Mod: CPTII,S$GLB,, | Performed by: PHYSICIAN ASSISTANT

## 2023-03-16 PROCEDURE — 99203 OFFICE O/P NEW LOW 30 MIN: CPT | Mod: 25,S$GLB,, | Performed by: PHYSICIAN ASSISTANT

## 2023-03-16 PROCEDURE — 3044F PR MOST RECENT HEMOGLOBIN A1C LEVEL <7.0%: ICD-10-PCS | Mod: CPTII,S$GLB,, | Performed by: PHYSICIAN ASSISTANT

## 2023-03-16 PROCEDURE — 3008F PR BODY MASS INDEX (BMI) DOCUMENTED: ICD-10-PCS | Mod: CPTII,S$GLB,, | Performed by: PHYSICIAN ASSISTANT

## 2023-03-16 PROCEDURE — 31231 NASAL ENDOSCOPY DX: CPT | Mod: S$GLB,,, | Performed by: PHYSICIAN ASSISTANT

## 2023-03-16 PROCEDURE — 99999 PR PBB SHADOW E&M-EST. PATIENT-LVL III: ICD-10-PCS | Mod: PBBFAC,,, | Performed by: PHYSICIAN ASSISTANT

## 2023-03-16 PROCEDURE — 99203 PR OFFICE/OUTPT VISIT, NEW, LEVL III, 30-44 MIN: ICD-10-PCS | Mod: 25,S$GLB,, | Performed by: PHYSICIAN ASSISTANT

## 2023-03-16 NOTE — PROCEDURES
"Nasal/sinus endoscopy    Date/Time: 3/16/2023 9:30 AM    Time out: Immediately prior to procedure a "time out" was called to verify the correct patient, procedure, equipment, support staff and site/side marked as required.  Performed by: Abner Cordova PA-C  Authorized by: Abner Cordova PA-C     Consent Done?:  Yes (Verbal)  Anesthesia:     Local anesthetic:  Lidocaine 4% and Bartolome-Synephrine 1/2%    Location: Bilateral.    Endoscope type: Rigid, 30 degree.    Patient tolerance:  Patient tolerated the procedure well with no immediate complications  Nose:     Procedure Performed:  Nasal Endoscopy  External:      No external nasal deformity  Intranasal:      Mucosa no polyps     Mucosa ulcers not present     No mucosa lesions present     Turbinates not enlarged     Septum gross deformity  Nasopharynx:      Posterior choanae not patent     Eustachian tube not patent     Edema of R MT, abutting lateral wall  Erythema to bilateral nasal cavities  No masses or abnormalities of olfactory clefts  "

## 2023-03-16 NOTE — PATIENT INSTRUCTIONS
Use these olfactory exercises for at least the next two weeks.     Nasal Steroid Spray  You have been prescribed or instructed to take a nasal steroid spray (Flonase). Some symptoms will experience relief within a few days; however, it may take 2-3 weeks to begin to see improvement. This medication needs to be taken consistently to see results.    Use as directed and please be aware the Flonase takes 7-10 days of consistent use before becoming effective- so try to be patient :)    Helpful hints for maximizing nasal spray medication into the nose  - Use the opposite hand to spray the nostril (example: right hand for left nostril). This will help avoid spraying the medication onto the septum (the area that divides the left and right nasal cavity.)  - Tilt the bottle so that it is facing at a slight angle up or straight back, but avoid pointing the bottle straight up while spraying.   - Gently sniff (do not snort) a few seconds after spraying.

## 2023-03-16 NOTE — PROGRESS NOTES
Subjective:     HPI: Irene Lawton is a 62 y.o. female who was referred to me by Dr. Aury Sequeira in consultation for  anosmia .    Patient reports intermittent anosmia over the past several years, especially prior to COVID.  Patient states he recently there was a soiled diaper near her and she did not smell it.  Patient states that in the past she has been able to smell this.  Patient reports seasonal rhinitis with postnasal drip and sneezing.  Today patient does report forehead pressure which usually happen several times a month and will last about 2 days.  Patient denies any rhinorrhea and denies any tenderness on palpation of her face or forehead.  Patient reports intact sense of taste.     Current sinonasal medications include flonase as needed.  The last course of antibiotics was a long time ago.    She does not regularly use nasal decongestant sprays (afrin/oxymetazoline/phenylephrine).  She does not recall previously having allergy testing.  She denies a history of asthma.  She relates a history of reflux symptoms which is managed with over-the-counter medication as needed.    She denies a diagnosis of obstructive sleep apnea. She denoes report snoring but denies any daytime fatigue.   She has not had sinonasal surgery.    She does not recall a prior history of nasal trauma.  She has not had a tonsillectomy.  She is not a tobacco smoker.   She has NOT had S. pneumo titers checked.        %       Past Medical/Past Surgical History  Past Medical History:   Diagnosis Date    ALLERGIC RHINITIS     Anemia     Anxiety     Cancer     right neck    Hypertension     Transient elevated blood pressure      She has a past surgical history that includes Tubal ligation; Surgical removal neck cancer (Right); and Colonoscopy (N/A, 12/12/2020).    Family History/Social History  Her family history includes Breast cancer in her mother; Hypertension in her mother.  She reports that she has never smoked. She has never  used smokeless tobacco. She reports current alcohol use. She reports that she does not use drugs.    Allergies/Immunizations  She is allergic to no known allergies.  Immunization History   Administered Date(s) Administered    COVID-19, vector-nr, rS-Ad26, PF (Sqrrl) 07/30/2021    Influenza - Quadrivalent - PF *Preferred* (6 months and older) 02/22/2018    MMR 04/13/2016, 05/12/2016    Tdap 03/22/2016        Medications   fluticasone propionate  ibuprofen  MULTIVITAMIN ORAL  PREMARIN Crea     Review of Systems     Constitutional: Negative for appetite change, chills, fatigue, fever and unexpected weight loss.      HENT: Positive for sinus pressure.      Eyes:  Negative for change in eyesight, eye drainage, eye itching and photophobia.     Respiratory:  Positive for shortness of breath.      Cardiovascular:  Negative for chest pain, foot swelling, irregular heartbeat and swollen veins.     Gastrointestinal:  Positive for acid reflux, constipation and heartburn.     Genitourinary: Positive for frequent urination.     Musc: Positive for back pain.     Skin: Negative for rash.     Allergy: Positive for seasonal allergies.     Endocrine: Negative for cold intolerance and heat intolerance.      Neurological: Negative for dizziness, headaches, light-headedness, seizures and tremors.      Hematologic: Negative for bruises/bleeds easily and swollen glands.      Psychiatric: Negative for decreased concentration, depression, nervous/anxious and sleep disturbance.          Objective:     LMP 06/27/2012        Constitutional:   She appears well-developed and well-nourished. Normal speech.      Head:  Normocephalic and atraumatic. Facial strength is normal.      Ears:    Right Ear: No drainage or swelling. Tympanic membrane is not perforated and not erythematous. No middle ear effusion.   Left Ear: No drainage or swelling. Tympanic membrane is not perforated and not erythematous.  No middle ear effusion.     Nose:  Septal  deviation present. No mucosal edema, rhinorrhea or polyps.  No foreign bodies. Turbinates normal, no turbinate masses and no turbinate hypertrophy (1+).  Right sinus exhibits no maxillary sinus tenderness and no frontal sinus tenderness. Left sinus exhibits no maxillary sinus tenderness and no frontal sinus tenderness.   L anterior septum with dried mucous with blood tinged  yellow/clear crust    Mouth/Throat  Oropharynx clear and moist without lesions or asymmetry and normal uvula midline. She has dentures. No trismus or mucous membrane lesions. Dental caries: upper and lower.No oropharyngeal exudate, posterior oropharyngeal edema or posterior oropharyngeal erythema. Tonsils present, +1.      Neck:  Neck normal without thyromegaly masses, asymmetry, normal tracheal structure, crepitus, and tenderness and phonation normal.     She has no cervical adenopathy.     Pulmonary/Chest:   Effort normal.     Psychiatric:   She has a normal mood and affect. Her speech is normal and behavior is normal.     Procedure    Nasal endoscopy performed.  See procedure note.     L nasal valve with erythema and dried yellow mucous on anterior nasal septum     L MT     R nasal valve with deviated septum     R MT abutting lateral wall of nasal sinus     No evidence of abnormality in olfactory cleft      Data Reviewed  I personally reviewed the chart, including any outside records, and pertinent data below:    WBC (K/uL)   Date Value   03/15/2023 7.72     Eosinophil % (%)   Date Value   03/15/2023 1.0     Eos # (K/uL)   Date Value   03/15/2023 0.1     Platelets (K/uL)   Date Value   03/15/2023 320     Glucose (mg/dL)   Date Value   03/15/2023 91     No results found for: IGE    No sinus imaging available.    Assessment & Plan:     1. Dysosmia  -     No evidence of intranasal abnormality on scope to explain fluctuating smell  - Provided patient with olfactory training education  - Advised patient to start flonase regularly    2. Deviated  nasal septum   - No current/chronic nasal obstructive symptoms reported     She will Follow up in about 2 months (around 5/16/2023) for re-evaluate post treatment.  I had a discussion with the patient regarding her condition and the further workup and management options.    All questions were answered, and the patient is in agreement with the above.     Disclaimer:  This note may have been prepared utilizing voice recognition software which may result in occasional typographical errors in the text such as sound alike words.   If further clarification is needed, please contact the ENT department of [facility].

## 2023-03-17 ENCOUNTER — OFFICE VISIT (OUTPATIENT)
Dept: UROGYNECOLOGY | Facility: CLINIC | Age: 63
End: 2023-03-17
Payer: COMMERCIAL

## 2023-03-17 VITALS
BODY MASS INDEX: 33.6 KG/M2 | HEIGHT: 63 IN | SYSTOLIC BLOOD PRESSURE: 142 MMHG | DIASTOLIC BLOOD PRESSURE: 80 MMHG | WEIGHT: 189.63 LBS

## 2023-03-17 DIAGNOSIS — Z87.19 HX OF CONSTIPATION: ICD-10-CM

## 2023-03-17 DIAGNOSIS — Z46.89 PESSARY MAINTENANCE: ICD-10-CM

## 2023-03-17 DIAGNOSIS — N81.10 PROLAPSE OF ANTERIOR VAGINAL WALL: ICD-10-CM

## 2023-03-17 DIAGNOSIS — N81.2 UTEROVAGINAL PROLAPSE, INCOMPLETE: Primary | ICD-10-CM

## 2023-03-17 DIAGNOSIS — N95.2 VAGINAL ATROPHY: ICD-10-CM

## 2023-03-17 PROCEDURE — 1160F PR REVIEW ALL MEDS BY PRESCRIBER/CLIN PHARMACIST DOCUMENTED: ICD-10-PCS | Mod: CPTII,S$GLB,, | Performed by: NURSE PRACTITIONER

## 2023-03-17 PROCEDURE — 99213 PR OFFICE/OUTPT VISIT, EST, LEVL III, 20-29 MIN: ICD-10-PCS | Mod: S$GLB,,, | Performed by: NURSE PRACTITIONER

## 2023-03-17 PROCEDURE — 99213 OFFICE O/P EST LOW 20 MIN: CPT | Mod: S$GLB,,, | Performed by: NURSE PRACTITIONER

## 2023-03-17 PROCEDURE — 3008F BODY MASS INDEX DOCD: CPT | Mod: CPTII,S$GLB,, | Performed by: NURSE PRACTITIONER

## 2023-03-17 PROCEDURE — 3079F DIAST BP 80-89 MM HG: CPT | Mod: CPTII,S$GLB,, | Performed by: NURSE PRACTITIONER

## 2023-03-17 PROCEDURE — 1159F MED LIST DOCD IN RCRD: CPT | Mod: CPTII,S$GLB,, | Performed by: NURSE PRACTITIONER

## 2023-03-17 PROCEDURE — 99999 PR PBB SHADOW E&M-EST. PATIENT-LVL III: CPT | Mod: PBBFAC,,, | Performed by: NURSE PRACTITIONER

## 2023-03-17 PROCEDURE — 3077F SYST BP >= 140 MM HG: CPT | Mod: CPTII,S$GLB,, | Performed by: NURSE PRACTITIONER

## 2023-03-17 PROCEDURE — 3008F PR BODY MASS INDEX (BMI) DOCUMENTED: ICD-10-PCS | Mod: CPTII,S$GLB,, | Performed by: NURSE PRACTITIONER

## 2023-03-17 PROCEDURE — 3044F HG A1C LEVEL LT 7.0%: CPT | Mod: CPTII,S$GLB,, | Performed by: NURSE PRACTITIONER

## 2023-03-17 PROCEDURE — 3079F PR MOST RECENT DIASTOLIC BLOOD PRESSURE 80-89 MM HG: ICD-10-PCS | Mod: CPTII,S$GLB,, | Performed by: NURSE PRACTITIONER

## 2023-03-17 PROCEDURE — 99999 PR PBB SHADOW E&M-EST. PATIENT-LVL III: ICD-10-PCS | Mod: PBBFAC,,, | Performed by: NURSE PRACTITIONER

## 2023-03-17 PROCEDURE — 1159F PR MEDICATION LIST DOCUMENTED IN MEDICAL RECORD: ICD-10-PCS | Mod: CPTII,S$GLB,, | Performed by: NURSE PRACTITIONER

## 2023-03-17 PROCEDURE — 3077F PR MOST RECENT SYSTOLIC BLOOD PRESSURE >= 140 MM HG: ICD-10-PCS | Mod: CPTII,S$GLB,, | Performed by: NURSE PRACTITIONER

## 2023-03-17 PROCEDURE — 3044F PR MOST RECENT HEMOGLOBIN A1C LEVEL <7.0%: ICD-10-PCS | Mod: CPTII,S$GLB,, | Performed by: NURSE PRACTITIONER

## 2023-03-17 PROCEDURE — 1160F RVW MEDS BY RX/DR IN RCRD: CPT | Mod: CPTII,S$GLB,, | Performed by: NURSE PRACTITIONER

## 2023-03-17 NOTE — PATIENT INSTRUCTIONS
1. Urge incontinence, rare  --Empty bladder every 3 hours.  Empty well: wait a minute, lean forward on toilet.    --Avoid dietary irritants (see sheet).  Keep diary x 3-5 days to determine your irritants.  --KEGELS: do 10 in AM and 10 in PM, holding each x 10 seconds.  When you feel urge to go, STOP, KEGEL, and when urge has passed, then go to bathroom.   PT in future.    --URGE: hold  vesicare 5 mg for now     2. Prolapse: Stage 2 apical prolapse, Stage 2 anterior and posterior vaginal wall prolapse   --continue # 5 ring with support pessary  --PESSARY CARE: Remove pessary as frequently as nightly and as infrequently as every 2-3 weeks.  Remove before intercourse.  May need to remove before bowel movements if straining dislodges.   Wash with soap and water (no ).  Replace using small amount of water-based lubricant (like KY jelly) at end being introduced into vagina.     --PESSARY CARE: Remove pessary as frequently as nightly and as infrequently as every 2-3 weeks.  Remove before intercourse.  May need to remove before bowel movements if straining dislodges.   Wash with soap and water (no ).  Replace using small amount of water-based lubricant (like KY jelly) at end being introduced into vagina.     --Daily pelvic floor exercises as assigned,  Pelvic floor PT   --Non surgical options discussed with patient    --Surgical options discussed such as : Hysterectomy (vaginal or laparoscopic)  with apical suspension: SSF, USLS and SCP with mesh augmentation. We also discussed the pro's and con's of hysteropexy.  Risks/ benefits/ alternatives/ succuss and failure rates were reviewed. Information packets were given detailing surgical options.  She was also given web site information for: www.augs.org and www.Purple Blue Bosforpfd.org and http://www.fda.gov/MedicalDevices/UroGynSurgicalMesh/default.htm to review the details of the surgical options discussed and the use of mesh in surgery. She will review the  information given and we will discuss further at the follow up visit. She would like to avoid surgery therefore will continue with pessary fitting and pelvic floor PT.      3. Constipation:  --continue fiber supplement.     4. Vaginal atrophy (dryness):  Use 1 gram of estrogen cream in vagina  twice a week     5. RTC to discuss surgical options-- leave pessary out 4 days before

## 2023-03-17 NOTE — PROGRESS NOTES
Urogyn follow up  03/17/2023  .  [facility]  [address] [id number]    Irene Lawton  [id number]  1960      Irene Lawton is a 62 y.o. here for a urogyn follow up for urinary frequency for pessary check    Last HPI from 06/23/2022  Chief Complaint:  Urinary Frequency     History of Present Illness  HPI 62 Y O F  has a past medical history of ALLERGIC RHINITIS, Anemia, Anxiety, Cancer, Hypertension, and Transient elevated blood pressure.  Referred by Dr. Laguerre  for evaluation of urinary frequency and urgency.          [facility]     Question 6/22/2022 10:59 AM CDT - Filed by Patient   General Urogynecology: Are you experiencing the following?     Dysuria (painful urination) No   Nocturia:  waking up at night to empty your bladder  Yes   If you answered yes to the previous question, how many times does this happen per night? 1-2   Enuresis (urine loss during sleep) No   Dribbling urine after you urinate No   Hematuria (urine appears red) No   Type of stream Sprays   Urinary frequency: How often a day are you going to the bathroom per day?  Less than 10   Urinary Tract Infections: How many Urinary Tract Infections have you had in the past year? I have not had a UTI in the past year   If you have had a UTI in the past year, what treatments have you had so far?  I have not had a UTI in the past year   Urinary Incontinence (General): Are you experiencing the following?     Past consultation for incontinence: Have you ever seen someone for the evaluation of incontinence? No   If you answered yes to the previous question, please select all the therapies you have tried.  None of the above   Please note the effectiveness of the therapies. Ineffective   Need to wear protection to keep clothes dry  Yes   If you answered yes to the previous question, please shabana the protection you use.  Panty liner   If you wear protection, how much wetness is typically on each  "pad? Light   If you wear protection, how often do you have to change per day, if applicable?  1   Stress Symptoms: Are you experiencing the following?     Leakage of urine with cough, laugh and/or sneeze No   If you answered yes to the previous question, what is the frequency in days, weeks and/or months? Never   Leakage of urine with sex No   Leakage of urine with bending/ lifting No   Leakage of urine with briskly walking or jogging No   If you lose urine for any other reason not previously mentioned, please note it below, if applicable.      Urge Symptoms: Are you experiencing the following?     Urgency ("got to go" feeling) No   Urge: How frequently do you feel an urge to urinate (feeling like you "gotta go" to the bathroom and can't wait) Never   Do you experience a leakage of urine when you have a feeling of urgency?  Yes   Leakage of urine when unaware No   Past use of anticholinergics (medications used to treat overactive bladder) No   If you answered yes to the previous question, please shabana the anticholinergics you have used:      Have you ever used Mirbetriq (aka Mirabegron)?  No   Prolapse Symptoms: Are you experiencing any of the following?      Falling out/ Bulging/ Heaviness in the vagina Yes   Vaginal/ Abdominal Pain/ Pressure No   Need to strain/ Push to void No   Need to wait on the toilet before you void No   Unusual position to urinate (using your hands to push back the vaginal bulge) No   Sensation of incomplete emptying Yes   Past use of pessary device No   If you answered yes to the previous question, please list the devices you have used below.      Bowel Symptoms: Are you experiencing any of the following?     Constipation Yes   Diarrhea  No   Hematochezia (bloody stool) No   Incomplete evacuation of stool Yes   Involuntary loss of formed stool No   Fecal smearing/urgency No   Involuntary loss of gas Yes   Vaginal Symptoms: Are you experiencing any of the following?      Abnormal vaginal " bleeding  No   Vaginal dryness No   Sexually active  No   Dyspareunia (painful intercourse) No   Estrogen use  No        11/23/2022  1. Urge incontinence, rare  --took vesicare 5 mg intermittently  --voiding every hour-2 hours  --nocturia 1/night  --Denies UI unless she ignores urger     2. Prolapse: Stage 2 apical prolapse, Stage 2 anterior and posterior vaginal wall prolapse   --does not feel like prolapse has worsened     3. Constipation:  --denies constipation  --doing intermittent fasting  --eating fiber one     4. Vaginal atrophy (dryness):    --not using vaginal estrogen cream    Changes since last visit:  1. Urge incontinence, rare  --has improved with pessary  --voiding every 3 hours during the day  --only 1/ night     2. Prolapse: Stage 2 apical prolapse, Stage 2 anterior and posterior vaginal wall prolapse   --using #5 ring with support   --denies pain, bleeding, or discharge     3. Constipation:  --denies constipation  --taking fiber and has increased hydration     4. Vaginal atrophy (dryness):    --not using vaginal estrogen cream twice weekly          Past Medical History:   Diagnosis Date    ALLERGIC RHINITIS     Anemia     Anxiety     Cancer     right neck    Hypertension     Transient elevated blood pressure        Past Surgical History:   Procedure Laterality Date    COLONOSCOPY N/A 12/12/2020    Procedure: COLONOSCOPY;  Surgeon: GHADA Gamez MD;  Location: [address]);  Service: Endoscopy;  Laterality: N/A;  Pt reports family Hx colon cancer (Pt's father )  prep ins. emailed - COVID screening on 12/9/20 [facility]- [facility]    Surgical removal neck cancer Right     TUBAL LIGATION         Family History   Problem Relation Age of Onset    Hypertension Mother     Breast cancer Mother     Colon cancer Neg Hx     Ovarian cancer Neg Hx        Social History     Socioeconomic History    Marital status: Single    Number of children: 2    Years of education: 14 years   Occupational History     "Occupation: [employer] manager     Employer: [id number]   Tobacco Use    Smoking status: Never    Smokeless tobacco: Never   Substance and Sexual Activity    Alcohol use: Yes     Comment: Social    Drug use: No    Sexual activity: Yes     Partners: Male     Birth control/protection: Surgical   Social History Narrative    Works at [employer] and does not exercise regularly       Current Outpatient Medications   Medication Sig Dispense Refill    fluticasone propionate (FLONASE) 50 mcg/actuation nasal spray 1 spray (50 mcg total) by Each Nostril route once daily. 16 g 11    ibuprofen (ADVIL,MOTRIN) 800 MG tablet Take 1 tablet (800 mg total) by mouth 3 (three) times daily as needed for Pain. 90 tablet 0    MULTIVITAMIN ORAL Take by mouth.      conjugated estrogens (PREMARIN) vaginal cream 1 g with applicator or dime-sized amt with finger in vagina nightly x 2 weeks, then twice a week thereafter (Patient not taking: Reported on 3/14/2023) 45 g 12     No current facility-administered medications for this visit.       Review of patient's allergies indicates:   Allergen Reactions    No known allergies        Well woman:  Pap:06/2021 normal HPV negative  Mammo:11/2022 normal  Colonoscopy:12/2020 polyps  Dexa:none on file    ROS:  As per HPI.      Exam  BP (!) 142/80 (BP Location: Left arm, Patient Position: Sitting, BP Method: Large (Manual))   Ht 5' 3" (1.6 m)   Wt 86 kg (189 lb 9.5 oz)   LMP 06/27/2012   BMI 33.59 kg/m²   General: alert and oriented, no acute distress  Respiratory: normal respiratory effort  Abd: soft, non-tender, non-distended    PELVIC EXAM:   VULVA: normal appearing vulva with no masses, tenderness or lesions,   VAGINA: normal appearing vagina with normal color and discharge, no lesions, #5 ring with support pessary in place,   CERVIX: normal appearing cervix without discharge or lesions,   UTERUS: uterus is normal size, shape, consistency and nontender,   ADNEXA: no masses,   RECTAL: " deferred      Impression  1. Uterovaginal prolapse, incomplete        2. Prolapse of anterior vaginal wall        3. Vaginal atrophy        4. Pessary maintenance        5. Hx of constipation                We reviewed the above issues and discussed options for short-term versus long-term management of her problems.   Plan:   1. Urge incontinence, rare  --Empty bladder every 3 hours.  Empty well: wait a minute, lean forward on toilet.    --Avoid dietary irritants (see sheet).  Keep diary x 3-5 days to determine your irritants.  --KEGELS: do 10 in AM and 10 in PM, holding each x 10 seconds.  When you feel urge to go, STOP, KEGEL, and when urge has passed, then go to bathroom.   PT in future.    --URGE: hold  vesicare 5 mg for now     2. Prolapse: Stage 2 apical prolapse, Stage 2 anterior and posterior vaginal wall prolapse   --continue # 5 ring with support pessary  --PESSARY CARE: Remove pessary as frequently as nightly and as infrequently as every 2-3 weeks.  Remove before intercourse.  May need to remove before bowel movements if straining dislodges.   Wash with soap and water (no ).  Replace using small amount of water-based lubricant (like KY jelly) at end being introduced into vagina.     --PESSARY CARE: Remove pessary as frequently as nightly and as infrequently as every 2-3 weeks.  Remove before intercourse.  May need to remove before bowel movements if straining dislodges.   Wash with soap and water (no ).  Replace using small amount of water-based lubricant (like KY jelly) at end being introduced into vagina.     --Daily pelvic floor exercises as assigned,  Pelvic floor PT   --Non surgical options discussed with patient    --Surgical options discussed such as : Hysterectomy (vaginal or laparoscopic)  with apical suspension: SSF, USLS and SCP with mesh augmentation. We also discussed the pro's and con's of hysteropexy.  Risks/ benefits/ alternatives/ succuss and failure rates were  reviewed. Information packets were given detailing surgical options.  She was also given web site information for: www.augs.org and www.SingleFeedsforpTippr.org and http://www.fda.gov/MedicalDevices/UroGynSurgicalMesh/default.htm to review the details of the surgical options discussed and the use of mesh in surgery. She will review the information given and we will discuss further at the follow up visit. She would like to avoid surgery therefore will continue with pessary fitting and pelvic floor PT.      3. Constipation:  --continue fiber supplement.     4. Vaginal atrophy (dryness):  Use 1 gram of estrogen cream in vagina  twice a week     5. RTC to discuss surgical options-- leave pessary out 4 days before    I spent a total of 20 minutes on the day of the visit.    This includes face to face time and non-face to face time preparing to see the patient (eg, review of tests), obtaining and/or reviewing separately obtained history, documenting clinical information in the electronic or other health record, independently interpreting results and communicating results to the patient/family/caregiver, or care coordinator.       Anupama Mai, FNP-BC [facility]  Division of Female Pelvic Medicine and Reconstructive Surgery  Department of Obstetrics & Gynecology

## 2023-03-27 ENCOUNTER — TELEPHONE (OUTPATIENT)
Dept: INTERNAL MEDICINE | Facility: CLINIC | Age: 63
End: 2023-03-27
Payer: COMMERCIAL

## 2023-03-27 DIAGNOSIS — E78.5 HYPERLIPIDEMIA, UNSPECIFIED HYPERLIPIDEMIA TYPE: Primary | ICD-10-CM

## 2023-03-27 NOTE — TELEPHONE ENCOUNTER
The 10-year ASCVD risk score (Alma BROUSSARD, et al., 2019) is: 8.6%    Values used to calculate the score:      Age: 62 years      Sex: Female      Is Non- : Yes      Diabetic: No      Tobacco smoker: No      Systolic Blood Pressure: 142 mmHg      Is BP treated: No      HDL Cholesterol: 72 mg/dL      Total Cholesterol: 244 mg/dL    Please inform patient that labs are significant for elevated cholesterol.  Her 10 year risk of vascular disease, including heart attack and stroke, is increased at 8.6%.  Normal is less than 5%.  Recommend starting Lipitor.  Please advise if patient is amenable.  Also, schedule follow-up appointment in 6 months with repeat labs.  Encouraged healthy diet and regular exercise.

## 2023-04-11 ENCOUNTER — OFFICE VISIT (OUTPATIENT)
Dept: UROGYNECOLOGY | Facility: CLINIC | Age: 63
End: 2023-04-11
Payer: COMMERCIAL

## 2023-04-11 VITALS — DIASTOLIC BLOOD PRESSURE: 80 MMHG | SYSTOLIC BLOOD PRESSURE: 140 MMHG | HEIGHT: 63 IN | BODY MASS INDEX: 33.59 KG/M2

## 2023-04-11 DIAGNOSIS — N81.2 UTEROVAGINAL PROLAPSE, INCOMPLETE: Primary | ICD-10-CM

## 2023-04-11 DIAGNOSIS — Z87.19 HX OF CONSTIPATION: ICD-10-CM

## 2023-04-11 DIAGNOSIS — N95.2 VAGINAL ATROPHY: ICD-10-CM

## 2023-04-11 DIAGNOSIS — N81.10 PROLAPSE OF ANTERIOR VAGINAL WALL: ICD-10-CM

## 2023-04-11 PROCEDURE — 99213 OFFICE O/P EST LOW 20 MIN: CPT | Mod: S$GLB,,, | Performed by: NURSE PRACTITIONER

## 2023-04-11 PROCEDURE — 1159F MED LIST DOCD IN RCRD: CPT | Mod: CPTII,S$GLB,, | Performed by: NURSE PRACTITIONER

## 2023-04-11 PROCEDURE — 1159F PR MEDICATION LIST DOCUMENTED IN MEDICAL RECORD: ICD-10-PCS | Mod: CPTII,S$GLB,, | Performed by: NURSE PRACTITIONER

## 2023-04-11 PROCEDURE — 3008F PR BODY MASS INDEX (BMI) DOCUMENTED: ICD-10-PCS | Mod: CPTII,S$GLB,, | Performed by: NURSE PRACTITIONER

## 2023-04-11 PROCEDURE — 3008F BODY MASS INDEX DOCD: CPT | Mod: CPTII,S$GLB,, | Performed by: NURSE PRACTITIONER

## 2023-04-11 PROCEDURE — 1160F RVW MEDS BY RX/DR IN RCRD: CPT | Mod: CPTII,S$GLB,, | Performed by: NURSE PRACTITIONER

## 2023-04-11 PROCEDURE — 3044F HG A1C LEVEL LT 7.0%: CPT | Mod: CPTII,S$GLB,, | Performed by: NURSE PRACTITIONER

## 2023-04-11 PROCEDURE — 3044F PR MOST RECENT HEMOGLOBIN A1C LEVEL <7.0%: ICD-10-PCS | Mod: CPTII,S$GLB,, | Performed by: NURSE PRACTITIONER

## 2023-04-11 PROCEDURE — 99213 PR OFFICE/OUTPT VISIT, EST, LEVL III, 20-29 MIN: ICD-10-PCS | Mod: S$GLB,,, | Performed by: NURSE PRACTITIONER

## 2023-04-11 PROCEDURE — 3077F SYST BP >= 140 MM HG: CPT | Mod: CPTII,S$GLB,, | Performed by: NURSE PRACTITIONER

## 2023-04-11 PROCEDURE — 3077F PR MOST RECENT SYSTOLIC BLOOD PRESSURE >= 140 MM HG: ICD-10-PCS | Mod: CPTII,S$GLB,, | Performed by: NURSE PRACTITIONER

## 2023-04-11 PROCEDURE — 3079F DIAST BP 80-89 MM HG: CPT | Mod: CPTII,S$GLB,, | Performed by: NURSE PRACTITIONER

## 2023-04-11 PROCEDURE — 1160F PR REVIEW ALL MEDS BY PRESCRIBER/CLIN PHARMACIST DOCUMENTED: ICD-10-PCS | Mod: CPTII,S$GLB,, | Performed by: NURSE PRACTITIONER

## 2023-04-11 PROCEDURE — 3079F PR MOST RECENT DIASTOLIC BLOOD PRESSURE 80-89 MM HG: ICD-10-PCS | Mod: CPTII,S$GLB,, | Performed by: NURSE PRACTITIONER

## 2023-04-11 PROCEDURE — 99999 PR PBB SHADOW E&M-EST. PATIENT-LVL III: ICD-10-PCS | Mod: PBBFAC,,, | Performed by: NURSE PRACTITIONER

## 2023-04-11 PROCEDURE — 99999 PR PBB SHADOW E&M-EST. PATIENT-LVL III: CPT | Mod: PBBFAC,,, | Performed by: NURSE PRACTITIONER

## 2023-04-11 NOTE — PROGRESS NOTES
Urogyn follow up  04/11/2023  .  [facility]  [address] [address]    Irene Lawton  [id number]  1960      Irene Lawton is a 62 y.o. here for a urogyn follow up for urinary frequency for pessary check    Last HPI from 06/23/2022  Chief Complaint:  Urinary Frequency     History of Present Illness  HPI 62 Y O F  has a past medical history of ALLERGIC RHINITIS, Anemia, Anxiety, Cancer, Hypertension, and Transient elevated blood pressure.  Referred by Dr. Laguerre  for evaluation of urinary frequency and urgency.          [facility] Urogyn Hpi     Question 6/22/2022 10:59 AM CDT - Filed by Patient   General Urogynecology: Are you experiencing the following?     Dysuria (painful urination) No   Nocturia:  waking up at night to empty your bladder  Yes   If you answered yes to the previous question, how many times does this happen per night? 1-2   Enuresis (urine loss during sleep) No   Dribbling urine after you urinate No   Hematuria (urine appears red) No   Type of stream Sprays   Urinary frequency: How often a day are you going to the bathroom per day?  Less than 10   Urinary Tract Infections: How many Urinary Tract Infections have you had in the past year? I have not had a UTI in the past year   If you have had a UTI in the past year, what treatments have you had so far?  I have not had a UTI in the past year   Urinary Incontinence (General): Are you experiencing the following?     Past consultation for incontinence: Have you ever seen someone for the evaluation of incontinence? No   If you answered yes to the previous question, please select all the therapies you have tried.  None of the above   Please note the effectiveness of the therapies. Ineffective   Need to wear protection to keep clothes dry  Yes   If you answered yes to the previous question, please shabana the protection you use.  Panty liner   If you wear protection, how much wetness is typically on each  "pad? Light   If you wear protection, how often do you have to change per day, if applicable?  1   Stress Symptoms: Are you experiencing the following?     Leakage of urine with cough, laugh and/or sneeze No   If you answered yes to the previous question, what is the frequency in days, weeks and/or months? Never   Leakage of urine with sex No   Leakage of urine with bending/ lifting No   Leakage of urine with briskly walking or jogging No   If you lose urine for any other reason not previously mentioned, please note it below, if applicable.      Urge Symptoms: Are you experiencing the following?     Urgency ("got to go" feeling) No   Urge: How frequently do you feel an urge to urinate (feeling like you "gotta go" to the bathroom and can't wait) Never   Do you experience a leakage of urine when you have a feeling of urgency?  Yes   Leakage of urine when unaware No   Past use of anticholinergics (medications used to treat overactive bladder) No   If you answered yes to the previous question, please shabana the anticholinergics you have used:      Have you ever used Mirbetriq (aka Mirabegron)?  No   Prolapse Symptoms: Are you experiencing any of the following?      Falling out/ Bulging/ Heaviness in the vagina Yes   Vaginal/ Abdominal Pain/ Pressure No   Need to strain/ Push to void No   Need to wait on the toilet before you void No   Unusual position to urinate (using your hands to push back the vaginal bulge) No   Sensation of incomplete emptying Yes   Past use of pessary device No   If you answered yes to the previous question, please list the devices you have used below.      Bowel Symptoms: Are you experiencing any of the following?     Constipation Yes   Diarrhea  No   Hematochezia (bloody stool) No   Incomplete evacuation of stool Yes   Involuntary loss of formed stool No   Fecal smearing/urgency No   Involuntary loss of gas Yes   Vaginal Symptoms: Are you experiencing any of the following?      Abnormal vaginal " bleeding  No   Vaginal dryness No   Sexually active  No   Dyspareunia (painful intercourse) No   Estrogen use  No        11/23/2022  1. Urge incontinence, rare  --took vesicare 5 mg intermittently  --voiding every hour-2 hours  --nocturia 1/night  --Denies UI unless she ignores urger     2. Prolapse: Stage 2 apical prolapse, Stage 2 anterior and posterior vaginal wall prolapse   --does not feel like prolapse has worsened     3. Constipation:  --denies constipation  --doing intermittent fasting  --eating fiber one     4. Vaginal atrophy (dryness):    --not using vaginal estrogen cream    Changes since last visit:  1. Urge incontinence, rare  --has improved with pessary  --voiding every 3 hours during the day  --only 1/ night     2. Prolapse: Stage 2 apical prolapse, Stage 2 anterior and posterior vaginal wall prolapse   --using #5 ring with support --has been out x 4 days  --denies pain, bleeding, or discharge  --here to discuss surgical options     3. Constipation:  --denies constipation  --taking fiber and has increased hydration     4. Vaginal atrophy (dryness):    --not using vaginal estrogen cream twice weekly          Past Medical History:   Diagnosis Date    ALLERGIC RHINITIS     Anemia     Anxiety     Cancer     right neck    Hypertension     Transient elevated blood pressure        Past Surgical History:   Procedure Laterality Date    COLONOSCOPY N/A 12/12/2020    Procedure: COLONOSCOPY;  Surgeon: GHADA Gamez MD;  Location: [address];  Service: Endoscopy;  Laterality: N/A;  Pt reports family Hx colon cancer (Pt's father )  prep ins. emailed - COVID screening on 12/9/20 [facility]    Surgical removal neck cancer Right     TUBAL LIGATION         Family History   Problem Relation Age of Onset    Hypertension Mother     Breast cancer Mother     Colon cancer Neg Hx     Ovarian cancer Neg Hx        Social History     Socioeconomic History    Marital status: Single    Number of children: 2    Years  "of education: 14 years   Occupational History    Occupation: [employer] manager     Employer: [id number]   Tobacco Use    Smoking status: Never    Smokeless tobacco: Never   Substance and Sexual Activity    Alcohol use: Yes     Comment: Social    Drug use: No    Sexual activity: Yes     Partners: Male     Birth control/protection: Surgical   Social History Narrative    Works at [employer] and does not exercise regularly       Current Outpatient Medications   Medication Sig Dispense Refill    fluticasone propionate (FLONASE) 50 mcg/actuation nasal spray 1 spray (50 mcg total) by Each Nostril route once daily. 16 g 11    ibuprofen (ADVIL,MOTRIN) 800 MG tablet Take 1 tablet (800 mg total) by mouth 3 (three) times daily as needed for Pain. 90 tablet 0    MULTIVITAMIN ORAL Take by mouth.      conjugated estrogens (PREMARIN) vaginal cream 1 g with applicator or dime-sized amt with finger in vagina nightly x 2 weeks, then twice a week thereafter (Patient not taking: Reported on 3/14/2023) 45 g 12     No current facility-administered medications for this visit.       Review of patient's allergies indicates:   Allergen Reactions    No known allergies        Well woman:  Pap:06/2021 normal HPV negative  Mammo:11/2022 normal  Colonoscopy:12/2020 polyps  Dexa:none on file    ROS:  As per HPI.      Exam  BP (!) 140/80 (BP Location: Right arm, Patient Position: Sitting, BP Method: Medium (Manual))   Ht 5' 3" (1.6 m)   LMP 06/27/2012   BMI 33.59 kg/m²   General: alert and oriented, no acute distress  Respiratory: normal respiratory effort  Abd: soft, non-tender, non-distended    PELVIC EXAM:   VULVA: normal appearing vulva with no masses, tenderness or lesions,   VAGINA: normal appearing vagina with normal color and discharge, no lesions, ,   CERVIX: normal appearing cervix without discharge or lesions,   UTERUS: uterus is normal size, shape, consistency and nontender,   ADNEXA: no masses,   RECTAL: deferred    Minimal " prolapse noted -- mild prolapse when standing      Patient has had pessary out for a few days    Dr. Parham present during exam      Impression  1. Uterovaginal prolapse, incomplete        2. Prolapse of anterior vaginal wall        3. Vaginal atrophy        4. Hx of constipation                  We reviewed the above issues and discussed options for short-term versus long-term management of her problems.   Plan:   1. Urge incontinence, rare  --Empty bladder every 3 hours.  Empty well: wait a minute, lean forward on toilet.    --Avoid dietary irritants (see sheet).  Keep diary x 3-5 days to determine your irritants.  --KEGELS: do 10 in AM and 10 in PM, holding each x 10 seconds.  When you feel urge to go, STOP, KEGEL, and when urge has passed, then go to bathroom.   PT in future.    --URGE: hold  vesicare 5 mg for now     2. Prolapse: Stage 2 apical prolapse, Stage 2 anterior and posterior vaginal wall prolapse   --do not use pessary until we see you again    --Daily pelvic floor exercises as assigned,  Pelvic floor PT   --Non surgical options discussed with patient    --Surgical options discussed such as : Hysterectomy (vaginal or laparoscopic)  with apical suspension: SSF, USLS and SCP with mesh augmentation. We also discussed the pro's and con's of hysteropexy.  Risks/ benefits/ alternatives/ succuss and failure rates were reviewed. Information packets were given detailing surgical options.  She was also given web site information for: www.augs.org and www.[employer]forpfd.org and http://www.fda.gov/MedicalDevices/UroGynSurgicalMesh/default.htm to review the details of the surgical options discussed and the use of mesh in surgery. She will review the information given and we will discuss further at the follow up visit. She would like to avoid surgery therefore will continue with pessary fitting and pelvic floor PT.      3. Constipation:  --continue fiber supplement.     4. Vaginal atrophy (dryness):  Use 1 gram  of estrogen cream in vagina  twice a week     5. RTC to discuss surgical options-- leave pessary out    I spent a total of 20 minutes on the day of the visit.    This includes face to face time and non-face to face time preparing to see the patient (eg, review of tests), obtaining and/or reviewing separately obtained history, documenting clinical information in the electronic or other health record, independently interpreting results and communicating results to the patient/family/caregiver, or care coordinator.       Anupama Mai, YAAKOV-BC  [facility]  Division of Female Pelvic Medicine and Reconstructive Surgery  Department of Obstetrics & Gynecology

## 2023-04-11 NOTE — PATIENT INSTRUCTIONS
1. Urge incontinence, rare  --Empty bladder every 3 hours.  Empty well: wait a minute, lean forward on toilet.    --Avoid dietary irritants (see sheet).  Keep diary x 3-5 days to determine your irritants.  --KEGELS: do 10 in AM and 10 in PM, holding each x 10 seconds.  When you feel urge to go, STOP, KEGEL, and when urge has passed, then go to bathroom.   PT in future.    --URGE: hold  vesicare 5 mg for now     2. Prolapse: Stage 2 apical prolapse, Stage 2 anterior and posterior vaginal wall prolapse   --do not use pessary until we see you again    --Daily pelvic floor exercises as assigned,  Pelvic floor PT   --Non surgical options discussed with patient    --Surgical options discussed such as : Hysterectomy (vaginal or laparoscopic)  with apical suspension: SSF, USLS and SCP with mesh augmentation. We also discussed the pro's and con's of hysteropexy.  Risks/ benefits/ alternatives/ succuss and failure rates were reviewed. Information packets were given detailing surgical options.  She was also given web site information for: www.augs.org and www.[employer]sforpfd.org and http://www.fda.gov/MedicalDevices/UroGynSurgicalMesh/default.htm to review the details of the surgical options discussed and the use of mesh in surgery. She will review the information given and we will discuss further at the follow up visit. She would like to avoid surgery therefore will continue with pessary fitting and pelvic floor PT.      3. Constipation:  --continue fiber supplement.     4. Vaginal atrophy (dryness):  Use 1 gram of estrogen cream in vagina  twice a week     5. RTC to discuss surgical options-- leave pessary out

## 2023-04-13 ENCOUNTER — TELEPHONE (OUTPATIENT)
Dept: UROGYNECOLOGY | Facility: CLINIC | Age: 63
End: 2023-04-13
Payer: COMMERCIAL

## 2023-04-13 NOTE — TELEPHONE ENCOUNTER
----- Message from Feroz Goldberg sent at 4/13/2023 11:42 AM CDT -----  Regarding: Appt  Contact: MIN SANFORD [[id number]]  ===View-only below this line===      ----- Message -----       From:Min Sanford       Sent:4/12/2023  3:29 PM CDT         To:Anupama Mai NP    Subject:Appointment Request    Appointment Request From: Min Sanford    With Provider: Anupama Mai NP [[facility]]    Preferred Date Range: Any date 4/18/2023 or later    Preferred Times: Tuesday Morning    Reason for visit: Surgery, We discussed it on Tuesday , I was told that she would double book me for April 18 at 8:30 AM    Comments:  Surgery

## 2023-04-18 ENCOUNTER — TELEPHONE (OUTPATIENT)
Dept: UROGYNECOLOGY | Facility: CLINIC | Age: 63
End: 2023-04-18
Payer: COMMERCIAL

## 2023-04-18 ENCOUNTER — OFFICE VISIT (OUTPATIENT)
Dept: UROGYNECOLOGY | Facility: CLINIC | Age: 63
End: 2023-04-18
Payer: COMMERCIAL

## 2023-04-18 VITALS
HEIGHT: 63 IN | SYSTOLIC BLOOD PRESSURE: 162 MMHG | DIASTOLIC BLOOD PRESSURE: 80 MMHG | WEIGHT: 188.5 LBS | BODY MASS INDEX: 33.4 KG/M2

## 2023-04-18 DIAGNOSIS — K59.00 CONSTIPATION, UNSPECIFIED CONSTIPATION TYPE: ICD-10-CM

## 2023-04-18 DIAGNOSIS — N81.2 UTEROVAGINAL PROLAPSE, INCOMPLETE: Primary | ICD-10-CM

## 2023-04-18 DIAGNOSIS — N95.2 VAGINAL ATROPHY: ICD-10-CM

## 2023-04-18 DIAGNOSIS — R39.15 URINARY URGENCY: ICD-10-CM

## 2023-04-18 DIAGNOSIS — N81.10 PROLAPSE OF ANTERIOR VAGINAL WALL: ICD-10-CM

## 2023-04-18 PROCEDURE — 3077F SYST BP >= 140 MM HG: CPT | Mod: CPTII,S$GLB,, | Performed by: NURSE PRACTITIONER

## 2023-04-18 PROCEDURE — 1159F PR MEDICATION LIST DOCUMENTED IN MEDICAL RECORD: ICD-10-PCS | Mod: CPTII,S$GLB,, | Performed by: NURSE PRACTITIONER

## 2023-04-18 PROCEDURE — 1159F MED LIST DOCD IN RCRD: CPT | Mod: CPTII,S$GLB,, | Performed by: NURSE PRACTITIONER

## 2023-04-18 PROCEDURE — 1160F PR REVIEW ALL MEDS BY PRESCRIBER/CLIN PHARMACIST DOCUMENTED: ICD-10-PCS | Mod: CPTII,S$GLB,, | Performed by: NURSE PRACTITIONER

## 2023-04-18 PROCEDURE — 99213 PR OFFICE/OUTPT VISIT, EST, LEVL III, 20-29 MIN: ICD-10-PCS | Mod: S$GLB,,, | Performed by: NURSE PRACTITIONER

## 2023-04-18 PROCEDURE — 99999 PR PBB SHADOW E&M-EST. PATIENT-LVL III: CPT | Mod: PBBFAC,,, | Performed by: NURSE PRACTITIONER

## 2023-04-18 PROCEDURE — 1160F RVW MEDS BY RX/DR IN RCRD: CPT | Mod: CPTII,S$GLB,, | Performed by: NURSE PRACTITIONER

## 2023-04-18 PROCEDURE — 3079F PR MOST RECENT DIASTOLIC BLOOD PRESSURE 80-89 MM HG: ICD-10-PCS | Mod: CPTII,S$GLB,, | Performed by: NURSE PRACTITIONER

## 2023-04-18 PROCEDURE — 3079F DIAST BP 80-89 MM HG: CPT | Mod: CPTII,S$GLB,, | Performed by: NURSE PRACTITIONER

## 2023-04-18 PROCEDURE — 99999 PR PBB SHADOW E&M-EST. PATIENT-LVL III: ICD-10-PCS | Mod: PBBFAC,,, | Performed by: NURSE PRACTITIONER

## 2023-04-18 PROCEDURE — 3008F PR BODY MASS INDEX (BMI) DOCUMENTED: ICD-10-PCS | Mod: CPTII,S$GLB,, | Performed by: NURSE PRACTITIONER

## 2023-04-18 PROCEDURE — 3044F HG A1C LEVEL LT 7.0%: CPT | Mod: CPTII,S$GLB,, | Performed by: NURSE PRACTITIONER

## 2023-04-18 PROCEDURE — 3077F PR MOST RECENT SYSTOLIC BLOOD PRESSURE >= 140 MM HG: ICD-10-PCS | Mod: CPTII,S$GLB,, | Performed by: NURSE PRACTITIONER

## 2023-04-18 PROCEDURE — 3008F BODY MASS INDEX DOCD: CPT | Mod: CPTII,S$GLB,, | Performed by: NURSE PRACTITIONER

## 2023-04-18 PROCEDURE — 99213 OFFICE O/P EST LOW 20 MIN: CPT | Mod: S$GLB,,, | Performed by: NURSE PRACTITIONER

## 2023-04-18 PROCEDURE — 3044F PR MOST RECENT HEMOGLOBIN A1C LEVEL <7.0%: ICD-10-PCS | Mod: CPTII,S$GLB,, | Performed by: NURSE PRACTITIONER

## 2023-04-18 NOTE — PROGRESS NOTES
Urogyn follow up  04/18/2023  .  [facility]  [address] [address]    Irene Lawton  [id number]  1960      Irene Lawton is a 62 y.o. here for a urogyn follow up for urinary frequency and to discuss surgical options    Last HPI from 06/23/2022  Chief Complaint:  Urinary Frequency     History of Present Illness  HPI 62 Y O F  has a past medical history of ALLERGIC RHINITIS, Anemia, Anxiety, Cancer, Hypertension, and Transient elevated blood pressure.  Referred by Dr. Laguerre  for evaluation of urinary frequency and urgency.          [facility] Peq Urogyn Hpi     Question 6/22/2022 10:59 AM CDT - Filed by Patient   General Urogynecology: Are you experiencing the following?     Dysuria (painful urination) No   Nocturia:  waking up at night to empty your bladder  Yes   If you answered yes to the previous question, how many times does this happen per night? 1-2   Enuresis (urine loss during sleep) No   Dribbling urine after you urinate No   Hematuria (urine appears red) No   Type of stream Sprays   Urinary frequency: How often a day are you going to the bathroom per day?  Less than 10   Urinary Tract Infections: How many Urinary Tract Infections have you had in the past year? I have not had a UTI in the past year   If you have had a UTI in the past year, what treatments have you had so far?  I have not had a UTI in the past year   Urinary Incontinence (General): Are you experiencing the following?     Past consultation for incontinence: Have you ever seen someone for the evaluation of incontinence? No   If you answered yes to the previous question, please select all the therapies you have tried.  None of the above   Please note the effectiveness of the therapies. Ineffective   Need to wear protection to keep clothes dry  Yes   If you answered yes to the previous question, please shabana the protection you use.  Panty liner   If you wear protection, how much wetness is  "typically on each pad? Light   If you wear protection, how often do you have to change per day, if applicable?  1   Stress Symptoms: Are you experiencing the following?     Leakage of urine with cough, laugh and/or sneeze No   If you answered yes to the previous question, what is the frequency in days, weeks and/or months? Never   Leakage of urine with sex No   Leakage of urine with bending/ lifting No   Leakage of urine with briskly walking or jogging No   If you lose urine for any other reason not previously mentioned, please note it below, if applicable.      Urge Symptoms: Are you experiencing the following?     Urgency ("got to go" feeling) No   Urge: How frequently do you feel an urge to urinate (feeling like you "gotta go" to the bathroom and can't wait) Never   Do you experience a leakage of urine when you have a feeling of urgency?  Yes   Leakage of urine when unaware No   Past use of anticholinergics (medications used to treat overactive bladder) No   If you answered yes to the previous question, please shabana the anticholinergics you have used:      Have you ever used Mirbetriq (aka Mirabegron)?  No   Prolapse Symptoms: Are you experiencing any of the following?      Falling out/ Bulging/ Heaviness in the vagina Yes   Vaginal/ Abdominal Pain/ Pressure No   Need to strain/ Push to void No   Need to wait on the toilet before you void No   Unusual position to urinate (using your hands to push back the vaginal bulge) No   Sensation of incomplete emptying Yes   Past use of pessary device No   If you answered yes to the previous question, please list the devices you have used below.      Bowel Symptoms: Are you experiencing any of the following?     Constipation Yes   Diarrhea  No   Hematochezia (bloody stool) No   Incomplete evacuation of stool Yes   Involuntary loss of formed stool No   Fecal smearing/urgency No   Involuntary loss of gas Yes   Vaginal Symptoms: Are you experiencing any of the following?    "   Abnormal vaginal bleeding  No   Vaginal dryness No   Sexually active  No   Dyspareunia (painful intercourse) No   Estrogen use  No        11/23/2022  1. Urge incontinence, rare  --took vesicare 5 mg intermittently  --voiding every hour-2 hours  --nocturia 1/night  --Denies UI unless she ignores urger     2. Prolapse: Stage 2 apical prolapse, Stage 2 anterior and posterior vaginal wall prolapse   --does not feel like prolapse has worsened     3. Constipation:  --denies constipation  --doing intermittent fasting  --eating fiber one     4. Vaginal atrophy (dryness):    --not using vaginal estrogen cream    04/11/2023  1. Urge incontinence, rare  --has improved with pessary  --voiding every 3 hours during the day  --only 1/ night     2. Prolapse: Stage 2 apical prolapse, Stage 2 anterior and posterior vaginal wall prolapse   --using #5 ring with support --has been out x 4 days  --denies pain, bleeding, or discharge  --here to discuss surgical options     3. Constipation:  --denies constipation  --taking fiber and has increased hydration     4. Vaginal atrophy (dryness):    --not using vaginal estrogen cream twice weekly          Past Medical History:   Diagnosis Date    ALLERGIC RHINITIS     Anemia     Anxiety     Cancer     right neck    Hypertension     Transient elevated blood pressure        Past Surgical History:   Procedure Laterality Date    COLONOSCOPY N/A 12/12/2020    Procedure: COLONOSCOPY;  Surgeon: GHADA Gamez MD;  Location: [address];  Service: Endoscopy;  Laterality: N/A;  Pt reports family Hx colon cancer (Pt's father )  prep ins. emailed - COVID screening on 12/9/20 [facility]    Surgical removal neck cancer Right     TUBAL LIGATION         Family History   Problem Relation Age of Onset    Hypertension Mother     Breast cancer Mother     Colon cancer Neg Hx     Ovarian cancer Neg Hx        Social History     Socioeconomic History    Marital status: Single    Number of children: 2     "Years of education: 14 years   Occupational History    Occupation: [employer] manager     Employer: [id number]   Tobacco Use    Smoking status: Never    Smokeless tobacco: Never   Substance and Sexual Activity    Alcohol use: Yes     Comment: Social    Drug use: No    Sexual activity: Yes     Partners: Male     Birth control/protection: Surgical   Social History Narrative    Works at [employer] and does not exercise regularly       Current Outpatient Medications   Medication Sig Dispense Refill    fluticasone propionate (FLONASE) 50 mcg/actuation nasal spray 1 spray (50 mcg total) by Each Nostril route once daily. 16 g 11    ibuprofen (ADVIL,MOTRIN) 800 MG tablet Take 1 tablet (800 mg total) by mouth 3 (three) times daily as needed for Pain. 90 tablet 0    MULTIVITAMIN ORAL Take by mouth.      conjugated estrogens (PREMARIN) vaginal cream 1 g with applicator or dime-sized amt with finger in vagina nightly x 2 weeks, then twice a week thereafter (Patient not taking: Reported on 3/14/2023) 45 g 12     No current facility-administered medications for this visit.       Review of patient's allergies indicates:   Allergen Reactions    No known allergies        Well woman:  Pap:06/2021 normal HPV negative  Mammo:11/2022 normal  Colonoscopy:12/2020 polyps  Dexa:none on file    ROS:  As per HPI.      Exam  BP (!) 162/80 (BP Location: Right arm, Patient Position: Sitting, BP Method: Medium (Manual))   Ht 5' 3" (1.6 m)   Wt 85.5 kg (188 lb 7.9 oz)   LMP 06/27/2012   BMI 33.39 kg/m²   General: alert and oriented, no acute distress  Respiratory: normal respiratory effort  Abd: soft, non-tender, non-distended    PELVIC EXAM:   VULVA: normal appearing vulva with no masses, tenderness or lesions,   VAGINA: normal appearing vagina with normal color and discharge, no lesions, ,   CERVIX: normal appearing cervix without discharge or lesions,   UTERUS: 12 week size, bulky, tender,   ADNEXA: no masses,   RECTAL: " deferred    POP-Q:  Aa +1; Ba +1; C -3; Ap -1; Bp -1; D -6.  Genital hiatus 4, perineal body 2 total vaginal length 10.        Patient has had pessary out for a few days    Dr. Parham present during exam      Impression  1. Uterovaginal prolapse, incomplete  US Pelvis Comp with Transvag NON-OB (xpd      2. Prolapse of anterior vaginal wall  US Pelvis Comp with Transvag NON-OB (xpd      3. Urinary urgency  US Pelvis Comp with Transvag NON-OB (xpd      4. Constipation, unspecified constipation type        5. Vaginal atrophy                    We reviewed the above issues and discussed options for short-term versus long-term management of her problems.   Plan:   1. Urge incontinence, rare  --Empty bladder every 3 hours.  Empty well: wait a minute, lean forward on toilet.    --Avoid dietary irritants (see sheet).  Keep diary x 3-5 days to determine your irritants.  --KEGELS: do 10 in AM and 10 in PM, holding each x 10 seconds.  When you feel urge to go, STOP, KEGEL, and when urge has passed, then go to bathroom.   PT in future.    --URGE: hold  vesicare 5 mg for now     2. Prolapse: Stage 2 apical prolapse, Stage 2 anterior and posterior vaginal wall prolapse   --do not use pessary until we see you again    --Daily pelvic floor exercises as assigned,  Pelvic floor PT   --Non surgical options discussed with patient    --Surgical options discussed such as :vaginal hysterectomy, removal of tubes, uterosacral ligament suspension, possible midurethral sling, and cystoscopy  --pelvic ultrasound ordered  --surgical day 5/8 or 5/12--or July.  Let me know in the next week     3. Constipation:  --continue fiber supplement.     4. Vaginal atrophy (dryness):  Use 1 gram of estrogen cream in vagina  twice a week     5. RTC for office cystometry and preop    I spent a total of 20 minutes on the day of the visit.    This includes face to face time and non-face to face time preparing to see the patient (eg, review of tests),  obtaining and/or reviewing separately obtained history, documenting clinical information in the electronic or other health record, independently interpreting results and communicating results to the patient/family/caregiver, or care coordinator.       Anupama Mai, YAAKOV-BC  [facility]  Division of Female Pelvic Medicine and Reconstructive Surgery  Department of Obstetrics & Gynecology

## 2023-04-18 NOTE — TELEPHONE ENCOUNTER
----- Message from Debbie Smith sent at 4/18/2023 12:42 PM CDT -----  Regarding: call back  Name of Who is Calling:MIN SANFORD [[id number]]          What is the request in detail: Pt has questions regarding her after visit summary and her pessary instructions. Please c/b to advise           Can the clinic reply by [facility]:          What Number to Call Back if not [facility]:[phone]

## 2023-04-18 NOTE — PATIENT INSTRUCTIONS
1. Urge incontinence, rare  --Empty bladder every 3 hours.  Empty well: wait a minute, lean forward on toilet.    --Avoid dietary irritants (see sheet).  Keep diary x 3-5 days to determine your irritants.  --KEGELS: do 10 in AM and 10 in PM, holding each x 10 seconds.  When you feel urge to go, STOP, KEGEL, and when urge has passed, then go to bathroom.   PT in future.    --URGE: hold  vesicare 5 mg for now     2. Prolapse: Stage 2 apical prolapse, Stage 2 anterior and posterior vaginal wall prolapse   --do not use pessary until we see you again    --Daily pelvic floor exercises as assigned,  Pelvic floor PT   --Non surgical options discussed with patient    --Surgical options discussed such as :vaginal hysterectomy, removal of tubes, uterosacral ligament suspension, possible midurethral sling, and cystoscopy  --pelvic ultrasound ordered  --surgical day 5/8 or 5/12--or July.  Let me know in the next week     3. Constipation:  --continue fiber supplement.     4. Vaginal atrophy (dryness):  Use 1 gram of estrogen cream in vagina  twice a week     5. RTC for office cystometry and preop

## 2023-04-18 NOTE — TELEPHONE ENCOUNTER
Informed pt per NP RADHA HERNANDEZ she is to put the pessary in now. Pt voiced understanding and call ended.

## 2023-04-27 ENCOUNTER — TELEPHONE (OUTPATIENT)
Dept: INTERNAL MEDICINE | Facility: CLINIC | Age: 63
End: 2023-04-27
Payer: COMMERCIAL

## 2023-04-27 ENCOUNTER — HOSPITAL ENCOUNTER (OUTPATIENT)
Dept: RADIOLOGY | Facility: OTHER | Age: 63
Discharge: HOME | End: 2023-04-27
Attending: NURSE PRACTITIONER
Payer: COMMERCIAL

## 2023-04-27 ENCOUNTER — TELEPHONE (OUTPATIENT)
Dept: UROGYNECOLOGY | Facility: CLINIC | Age: 63
End: 2023-04-27
Payer: COMMERCIAL

## 2023-04-27 DIAGNOSIS — Z01.818 PREOP EXAM FOR INTERNAL MEDICINE: Primary | ICD-10-CM

## 2023-04-27 DIAGNOSIS — N81.2 UTEROVAGINAL PROLAPSE, INCOMPLETE: Primary | ICD-10-CM

## 2023-04-27 DIAGNOSIS — N81.10 PROLAPSE OF ANTERIOR VAGINAL WALL: ICD-10-CM

## 2023-04-27 DIAGNOSIS — R39.15 URINARY URGENCY: ICD-10-CM

## 2023-04-27 DIAGNOSIS — N81.2 UTEROVAGINAL PROLAPSE, INCOMPLETE: ICD-10-CM

## 2023-04-27 PROCEDURE — 76856 US EXAM PELVIC COMPLETE: CPT | Mod: TC

## 2023-04-27 PROCEDURE — 76830 TRANSVAGINAL US NON-OB: CPT | Mod: 26,,, | Performed by: RADIOLOGY

## 2023-04-27 PROCEDURE — 76856 US PELVIS COMP WITH TRANSVAG NON-OB (XPD): ICD-10-PCS | Mod: 26,,, | Performed by: RADIOLOGY

## 2023-04-27 PROCEDURE — 76856 US EXAM PELVIC COMPLETE: CPT | Mod: 26,,, | Performed by: RADIOLOGY

## 2023-04-27 PROCEDURE — 76830 US PELVIS COMP WITH TRANSVAG NON-OB (XPD): ICD-10-PCS | Mod: 26,,, | Performed by: RADIOLOGY

## 2023-04-27 NOTE — TELEPHONE ENCOUNTER
----- Message from Nany Nguyen MA sent at 4/27/2023 11:25 AM CDT -----  Regarding: Surgery Clearance  Good morning, Irene Lawton is scheduled to have a  total vaginal hysterectomy, bilateral salpingectomy (VNOTES), uterosacral ligament suspension with Dr. Parham on 05/12/2023.  Please assist with surgical clearance.  Patient will need: EKG, BMP ,A1C, TSH for clearance.       When completed,  please place a note in Epic stating whether the patient is cleared for surgery. If you have any questions our office number is [phone], feel free to contact us.     Sincerely,     Flor Nguyen MA  Female Pelvic Medicine& Reconstructive surgery  [facility], Department of Urogynecology

## 2023-04-27 NOTE — TELEPHONE ENCOUNTER
Total vaginal hysterectomy, bilateral salpingectomy (VNOTES), uterosacral ligament suspension      PCP Dr. Sequeira, will send message    Pre op labs: EKG, BMP,A1C, TSH    TVS scheduled for 04/27

## 2023-04-27 NOTE — TELEPHONE ENCOUNTER
Patient will need a preop visit scheduled.  She will also need EKG, BMP, hemoglobin A1c and TSH performed for preoperative evaluation.

## 2023-05-03 ENCOUNTER — PATIENT MESSAGE (OUTPATIENT)
Dept: UROGYNECOLOGY | Facility: CLINIC | Age: 63
End: 2023-05-03
Payer: COMMERCIAL

## 2023-05-04 ENCOUNTER — OFFICE VISIT (OUTPATIENT)
Dept: INTERNAL MEDICINE | Facility: CLINIC | Age: 63
End: 2023-05-04
Payer: COMMERCIAL

## 2023-05-04 VITALS
HEIGHT: 63 IN | HEART RATE: 62 BPM | BODY MASS INDEX: 32.93 KG/M2 | OXYGEN SATURATION: 96 % | SYSTOLIC BLOOD PRESSURE: 164 MMHG | DIASTOLIC BLOOD PRESSURE: 88 MMHG | RESPIRATION RATE: 18 BRPM | WEIGHT: 185.88 LBS | TEMPERATURE: 99 F

## 2023-05-04 DIAGNOSIS — Z01.818 ENCOUNTER FOR PRE-OPERATIVE EXAMINATION: Primary | ICD-10-CM

## 2023-05-04 DIAGNOSIS — R03.0 ELEVATED BLOOD PRESSURE READING IN OFFICE WITHOUT DIAGNOSIS OF HYPERTENSION: ICD-10-CM

## 2023-05-04 PROCEDURE — 3008F PR BODY MASS INDEX (BMI) DOCUMENTED: ICD-10-PCS | Mod: CPTII,S$GLB,, | Performed by: NURSE PRACTITIONER

## 2023-05-04 PROCEDURE — 99214 OFFICE O/P EST MOD 30 MIN: CPT | Mod: S$GLB,,, | Performed by: NURSE PRACTITIONER

## 2023-05-04 PROCEDURE — 1160F PR REVIEW ALL MEDS BY PRESCRIBER/CLIN PHARMACIST DOCUMENTED: ICD-10-PCS | Mod: CPTII,S$GLB,, | Performed by: NURSE PRACTITIONER

## 2023-05-04 PROCEDURE — 3008F BODY MASS INDEX DOCD: CPT | Mod: CPTII,S$GLB,, | Performed by: NURSE PRACTITIONER

## 2023-05-04 PROCEDURE — 93010 EKG 12-LEAD: ICD-10-PCS | Mod: S$GLB,,, | Performed by: INTERNAL MEDICINE

## 2023-05-04 PROCEDURE — 93005 EKG 12-LEAD: ICD-10-PCS | Mod: S$GLB,,, | Performed by: NURSE PRACTITIONER

## 2023-05-04 PROCEDURE — 1160F RVW MEDS BY RX/DR IN RCRD: CPT | Mod: CPTII,S$GLB,, | Performed by: NURSE PRACTITIONER

## 2023-05-04 PROCEDURE — 3079F DIAST BP 80-89 MM HG: CPT | Mod: CPTII,S$GLB,, | Performed by: NURSE PRACTITIONER

## 2023-05-04 PROCEDURE — 1159F MED LIST DOCD IN RCRD: CPT | Mod: CPTII,S$GLB,, | Performed by: NURSE PRACTITIONER

## 2023-05-04 PROCEDURE — 93005 ELECTROCARDIOGRAM TRACING: CPT | Mod: S$GLB,,, | Performed by: NURSE PRACTITIONER

## 2023-05-04 PROCEDURE — 99214 PR OFFICE/OUTPT VISIT, EST, LEVL IV, 30-39 MIN: ICD-10-PCS | Mod: S$GLB,,, | Performed by: NURSE PRACTITIONER

## 2023-05-04 PROCEDURE — 3044F PR MOST RECENT HEMOGLOBIN A1C LEVEL <7.0%: ICD-10-PCS | Mod: CPTII,S$GLB,, | Performed by: NURSE PRACTITIONER

## 2023-05-04 PROCEDURE — 1159F PR MEDICATION LIST DOCUMENTED IN MEDICAL RECORD: ICD-10-PCS | Mod: CPTII,S$GLB,, | Performed by: NURSE PRACTITIONER

## 2023-05-04 PROCEDURE — 3044F HG A1C LEVEL LT 7.0%: CPT | Mod: CPTII,S$GLB,, | Performed by: NURSE PRACTITIONER

## 2023-05-04 PROCEDURE — 93010 ELECTROCARDIOGRAM REPORT: CPT | Mod: S$GLB,,, | Performed by: INTERNAL MEDICINE

## 2023-05-04 PROCEDURE — 3077F PR MOST RECENT SYSTOLIC BLOOD PRESSURE >= 140 MM HG: ICD-10-PCS | Mod: CPTII,S$GLB,, | Performed by: NURSE PRACTITIONER

## 2023-05-04 PROCEDURE — 99999 PR PBB SHADOW E&M-EST. PATIENT-LVL IV: ICD-10-PCS | Mod: PBBFAC,,, | Performed by: NURSE PRACTITIONER

## 2023-05-04 PROCEDURE — 3079F PR MOST RECENT DIASTOLIC BLOOD PRESSURE 80-89 MM HG: ICD-10-PCS | Mod: CPTII,S$GLB,, | Performed by: NURSE PRACTITIONER

## 2023-05-04 PROCEDURE — 99999 PR PBB SHADOW E&M-EST. PATIENT-LVL IV: CPT | Mod: PBBFAC,,, | Performed by: NURSE PRACTITIONER

## 2023-05-04 PROCEDURE — 3077F SYST BP >= 140 MM HG: CPT | Mod: CPTII,S$GLB,, | Performed by: NURSE PRACTITIONER

## 2023-05-04 RX ORDER — HYDROCHLOROTHIAZIDE 12.5 MG/1
12.5 TABLET ORAL DAILY
Qty: 30 TABLET | Refills: 11 | Status: SHIPPED | OUTPATIENT
Start: 2023-05-04 | End: 2024-02-07

## 2023-05-04 NOTE — PROGRESS NOTES
Subjective:       Patient ID: Irene Lawton is a 62 y.o. female.    Chief Complaint: Pre-op Exam (Prolapse uterine removal ) and Hypertension    Irene Lawton is a 62 y.o. female who presents today for pre-op evaluation.     Planned to undergo total laparoscopic Hysterectomy on 05/12/2023 by Dr. Parham.    History of prior anesthetic complications personally or in your family: no  Chronic Steroid usage: no  Is patient a current or former smoker: non-smoker  Do you have a personal of familial history of bleeding disorders or blood clotting: no  Does patient misuse alcohol or illicit/prescription drugs:  no    Past Medical History:   Diagnosis Date    ALLERGIC RHINITIS     Anemia     Anxiety     Cancer     right neck    Hypertension     Transient elevated blood pressure      Past Surgical History:   Procedure Laterality Date    COLONOSCOPY N/A 12/12/2020    Procedure: COLONOSCOPY;  Surgeon: GHADA Gamez MD;  Location: [address];  Service: Endoscopy;  Laterality: N/A;  Pt reports family Hx colon cancer (Pt's father )  prep ins. emailed - COVID screening on 12/9/20 [facility]    Surgical removal neck cancer Right     TUBAL LIGATION       Social History     Socioeconomic History    Marital status: Single    Number of children: 2    Years of education: 14 years   Occupational History    Occupation: [employer] manager     Employer: [id number]   Tobacco Use    Smoking status: Never    Smokeless tobacco: Never   Substance and Sexual Activity    Alcohol use: Yes     Comment: Social    Drug use: No    Sexual activity: Yes     Partners: Male     Birth control/protection: Surgical   Social History Narrative    Works at [employer] and does not exercise regularly     Social Determinants of Health     Financial Resource Strain: High Risk    Difficulty of Paying Living Expenses: Hard   Food Insecurity: Food Insecurity Present    Worried About Running Out of Food in the Last Year: Often true    Ran  "Out of Food in the Last Year: Sometimes true   Transportation Needs: No Transportation Needs    Lack of Transportation (Medical): No    Lack of Transportation (Non-Medical): No   Physical Activity: Unknown    Days of Exercise per Week: Patient refused    Minutes of Exercise per Session: 20 min   Stress: No Stress Concern Present    Feeling of Stress : Only a little   Social Connections: Unknown    Frequency of Communication with Friends and Family: Twice a week    Frequency of Social Gatherings with Friends and Family: Patient refused    Active Member of Clubs or Organizations: No    Attends Club or Organization Meetings: Patient refused    Marital Status: Never    Housing Stability: Unknown    Unable to Pay for Housing in the Last Year: Patient refused    Unstable Housing in the Last Year: No     Family History   Problem Relation Age of Onset    Hypertension Mother     Breast cancer Mother     Colon cancer Neg Hx     Ovarian cancer Neg Hx        Review of patient's allergies indicates:   Allergen Reactions    No known allergies        Medication List with Changes/Refills   Current Medications    CONJUGATED ESTROGENS (PREMARIN) VAGINAL CREAM    1 g with applicator or dime-sized amt with finger in vagina nightly x 2 weeks, then twice a week thereafter    FLUTICASONE PROPIONATE (FLONASE) 50 MCG/ACTUATION NASAL SPRAY    1 spray (50 mcg total) by Each Nostril route once daily.    IBUPROFEN (ADVIL,MOTRIN) 800 MG TABLET    Take 1 tablet (800 mg total) by mouth 3 (three) times daily as needed for Pain.    MULTIVITAMIN ORAL    Take by mouth.     ROS    Objective:   BP (!) 172/86 (BP Location: Right arm, Patient Position: Sitting, BP Method: Medium (Manual))   Pulse 62   Temp 98.8 °F (37.1 °C) (Temporal)   Resp 18   Ht 5' 2.5" (1.588 m)   Wt 84.3 kg (185 lb 13.6 oz)   LMP 06/27/2012   SpO2 96%   BMI 33.45 kg/m²     Physical Exam    BP Readings from Last 3 Encounters:   05/04/23 (!) 172/86   04/18/23 (!) 162/80 "   04/11/23 (!) 140/80       Last Labs:  Glucose   Date Value Ref Range Status   03/15/2023 91 70 - 110 mg/dL Final   01/21/2022 100 70 - 110 mg/dL Final     BUN   Date Value Ref Range Status   03/15/2023 18 8 - 23 mg/dL Final   01/21/2022 10 8 - 23 mg/dL Final     Creatinine   Date Value Ref Range Status   03/15/2023 0.7 0.5 - 1.4 mg/dL Final   01/21/2022 0.7 0.5 - 1.4 mg/dL Final     Hemoglobin   Date Value Ref Range Status   03/15/2023 12.5 12.0 - 16.0 g/dL Final   01/21/2022 12.3 12.0 - 16.0 g/dL Final     Hematocrit   Date Value Ref Range Status   03/15/2023 40.0 37.0 - 48.5 % Final   01/21/2022 39.9 37.0 - 48.5 % Final       I have reviewed the following:     Details / Date    [x]   Labs     []   Micro     []   Pathology     [x]   Imaging     [x]   Cardiology Procedures     []   Other       In Office EKG  Sinus Vladimir, 57 bpm   Minimal voltage criteria for LVH    Surgical Risk Assessment     Active cardiac issues:  Active decompensated heart failure? No   Unstable angina?  No   Significant uncontrolled arrhythmias? No   Severe valvular heart disease-Aortic or Mitral Stenosis? No   Recent MI or coronary revascularization   < 30 days? No       Clinical risk factors predicting perioperative major adverse cardiac events per RCRI  High risk surgery (suprainguinal vascular, intraperitoneal, or intrathoracic surgery)? No   History of CAD/ischemic heart disease? No   History of cerebrovascular disease (CVA or TIA)? No   History of compensated heart failure? No   Type 2 diabetes requiring insulin? No   Serum Creatinine > 2? No   Total cardiac risk factors 0     0 predictors = 0.4%, 1 predictor = 0.9%, 2 predictors = 6.6%, ?3 predictors = >11%    According to the revised cardiac risk index, the risk of chidi-procedural major cardiac complications (cardiac death, nonfatal MI, nonfatal cardiac arrest, postoperative cardiogenic pulmonary edema, complete heart block) is: 0.4%     If patient has a low risk of MACE (<1%),  proceed to surgery. If the patient is at elevated risk of MACE, then determine functional capacity (pt reported activity or DASI model).     If the patient has moderate, good, or excellent functional capacity (?4 METs), then proceed to surgery without further evaluation. If patient has poor or unknown functional capacity, will further testing impact decision making or perioperative care? If yes, then pharmacological stress testing is appropriate. In those patients with unknown functional capacity, exercise stress testing may be reasonable to perform.     Patient's functional mets: >=4    < 4 METs -unable to walk > 2 blocks on level ground without stopping due to symptoms  - eating, dressing, toileting, walking indoors, light housework. POOR   > 4 METs -climbing > 1 flight of stairs without stopping  -walking up hill > 1-2 blocks  -scrubbing floors  -moving furniture  - golf, bowling, dancing or tennis  -running short distance MODERATE to EXCELLENT     Assessment and Plan:         Patient Active Problem List   Diagnosis    Encounter for screening colonoscopy    Screen for colon cancer    Coordination impairment    Pelvic floor dysfunction     1. Encounter for pre-operative examination    Patient is medically optimized for low risk procedure with low cardiac risk; that said there is always risks associated with any operation and patient is aware. Patient may proceed to surgery.      - IN OFFICE EKG 12-LEAD (to Muse)    2. Elevated blood pressure reading in office without diagnosis of hypertension    Patient with history of HTN was on HCTZ in the past but pressure had been well controlled to medication was stopped. Will resume today and follow BP tomorrow and next week prior to surgery. Goal pressure < 140/90.     - hydroCHLOROthiazide (HYDRODIURIL) 12.5 MG Tab; Take 1 tablet (12.5 mg total) by mouth once daily.  Dispense: 30 tablet; Refill: 11

## 2023-05-08 ENCOUNTER — ANESTHESIA EVENT (OUTPATIENT)
Dept: SURGERY | Facility: OTHER | Age: 63
End: 2023-05-08
Payer: COMMERCIAL

## 2023-05-08 ENCOUNTER — PATIENT MESSAGE (OUTPATIENT)
Dept: INTERNAL MEDICINE | Facility: CLINIC | Age: 63
End: 2023-05-08
Payer: COMMERCIAL

## 2023-05-08 ENCOUNTER — TELEPHONE (OUTPATIENT)
Dept: UROGYNECOLOGY | Facility: CLINIC | Age: 63
End: 2023-05-08
Payer: COMMERCIAL

## 2023-05-08 ENCOUNTER — HOSPITAL ENCOUNTER (OUTPATIENT)
Dept: PREADMISSION TESTING | Facility: OTHER | Age: 63
Discharge: HOME | End: 2023-05-08
Attending: OBSTETRICS & GYNECOLOGY
Payer: COMMERCIAL

## 2023-05-08 VITALS
SYSTOLIC BLOOD PRESSURE: 180 MMHG | RESPIRATION RATE: 16 BRPM | TEMPERATURE: 98 F | DIASTOLIC BLOOD PRESSURE: 78 MMHG | WEIGHT: 185 LBS | HEIGHT: 62 IN | HEART RATE: 58 BPM | OXYGEN SATURATION: 97 % | BODY MASS INDEX: 34.04 KG/M2

## 2023-05-08 DIAGNOSIS — Z01.818 PREOP TESTING: Primary | ICD-10-CM

## 2023-05-08 LAB
ABO + RH BLD: NORMAL
BLD GP AB SCN CELLS X3 SERPL QL: NORMAL
SPECIMEN OUTDATE: NORMAL

## 2023-05-08 PROCEDURE — 36415 COLL VENOUS BLD VENIPUNCTURE: CPT | Performed by: ANESTHESIOLOGY

## 2023-05-08 PROCEDURE — 86900 BLOOD TYPING SEROLOGIC ABO: CPT | Performed by: ANESTHESIOLOGY

## 2023-05-08 RX ORDER — PROCHLORPERAZINE EDISYLATE 5 MG/ML
5 INJECTION INTRAMUSCULAR; INTRAVENOUS EVERY 30 MIN PRN
Status: CANCELLED | OUTPATIENT
Start: 2023-05-08

## 2023-05-08 RX ORDER — HYDROMORPHONE HYDROCHLORIDE 2 MG/ML
0.4 INJECTION, SOLUTION INTRAMUSCULAR; INTRAVENOUS; SUBCUTANEOUS EVERY 5 MIN PRN
Status: CANCELLED | OUTPATIENT
Start: 2023-05-08

## 2023-05-08 RX ORDER — LIDOCAINE HYDROCHLORIDE 10 MG/ML
0.5 INJECTION, SOLUTION EPIDURAL; INFILTRATION; INTRACAUDAL; PERINEURAL ONCE
Status: CANCELLED | OUTPATIENT
Start: 2023-05-08 | End: 2023-05-08

## 2023-05-08 RX ORDER — OXYCODONE HYDROCHLORIDE 5 MG/1
5 TABLET ORAL
Status: CANCELLED | OUTPATIENT
Start: 2023-05-08

## 2023-05-08 RX ORDER — DIPHENHYDRAMINE HYDROCHLORIDE 50 MG/ML
25 INJECTION INTRAMUSCULAR; INTRAVENOUS EVERY 6 HOURS PRN
Status: CANCELLED | OUTPATIENT
Start: 2023-05-08

## 2023-05-08 RX ORDER — MEPERIDINE HYDROCHLORIDE 25 MG/ML
12.5 INJECTION INTRAMUSCULAR; INTRAVENOUS; SUBCUTANEOUS ONCE AS NEEDED
Status: CANCELLED | OUTPATIENT
Start: 2023-05-08 | End: 2023-05-09

## 2023-05-08 RX ORDER — SODIUM CHLORIDE 0.9 % (FLUSH) 0.9 %
3 SYRINGE (ML) INJECTION
Status: DISCONTINUED | OUTPATIENT
Start: 2023-05-08 | End: 2023-05-09 | Stop reason: HOSPADM

## 2023-05-08 RX ORDER — SODIUM CHLORIDE, SODIUM LACTATE, POTASSIUM CHLORIDE, CALCIUM CHLORIDE 600; 310; 30; 20 MG/100ML; MG/100ML; MG/100ML; MG/100ML
INJECTION, SOLUTION INTRAVENOUS CONTINUOUS
Status: CANCELLED | OUTPATIENT
Start: 2023-05-08

## 2023-05-08 NOTE — DISCHARGE INSTRUCTIONS
Information to Prepare you for your Surgery    PRE-ADMIT TESTING -  [phone]    [address]          Your surgery has been scheduled at [facility]. We are pleased to have the opportunity to serve you. For Further Information please call [phone].    On the day of surgery please report to the Information Desk on the 1st floor.    CONTACT YOUR PHYSICIAN'S OFFICE THE DAY PRIOR TO YOUR SURGERY TO OBTAIN YOUR ARRIVAL TIME.     The evening before surgery do not eat anything after 9 p.m. ( this includes hard candy, chewing gum and mints).  You may only have GATORADE, POWERADE AND WATER  from 9 p.m. until you leave your home.   DO NOT DRINK ANY LIQUIDS ON THE WAY TO THE HOSPITAL.      Why does your anesthesiologist allow you to drink Gatorade/Powerade before surgery?  Gatorade/Powerade helps to increase your comfort before surgery and to decrease your nausea after surgery. The carbohydrates in Gatorade/Powerade help reduce your body's stress response to surgery.  If you are a diabetic-drink only water prior to surgery.       Patients may have 2 visitors pre and post procedure. Only 2 visitors will be allowed in the Surgical building with the patient. No one under the age of 12 will be allowed into the facility.    SPECIAL MEDICATION INSTRUCTIONS: TAKE medications checked off by the Anesthesiologist on your Medication List.    Angiogram Patients: Take medications as instructed by your physician, including aspirin.     Surgery Patients:    If you take ASPIRIN - Your PHYSICIAN/SURGEON will need to inform you IF/OR when you need to stop taking aspirin prior to your surgery.     The week prior to surgery do not ot take any medications containing IBUPROFEN or NSAIDS ( Advil, Motrin, Goodys, BC, Aleve, Naproxen etc) If you are not sure if you should take a medicine please call your surgeon's office.  Ok to take Tylenol    Do Not Wear any make-up  (especially eye make-up) to surgery. Please remove any false eyelashes or eyelash extensions. If you arrive the day of surgery with makeup/eyelashes on you will be required to remove prior to surgery. (There is a risk of corneal abrasions if eye makeup/eyelash extensions are not removed)      Leave all valuables at home.   Do Not wear any jewelry or watches, including any metal in body piercings. Jewelry must be removed prior to coming to the hospital.  There is a possibility that rings that are unable to be removed may be cut off if they are on the surgical extremity.    Please remove all hair extensions, wigs, clips and any other metal accessories/ ornaments from your hair.  These items may pose a flammable/fire risk in Surgery and must be removed.    Do not shave your surgical area at least 5 days prior to your surgery. The surgical prep will be performed at the hospital according to Infection Control regulations.    Contact Lens must be removed before surgery. Either do not wear the contact lens or bring a case and solution for storage.  Please bring a container for eyeglasses or dentures as required.  Bring any paperwork your physician has provided, such as consent forms,  history and physicals, doctor's orders, etc.   Bring comfortable clothes that are loose fitting to wear upon discharge. Take into consideration the type of surgery being performed.  Maintain your diet as advised per your physician the day prior to surgery.      Adequate rest the night before surgery is advised.   Park in the Parking lot behind the hospital or in the [facility] across the street from the parking lot. Parking is complimentary.  If you will be discharged the same day as your procedure, please arrange for a responsible adult to drive you home or to accompany you if traveling by taxi.   YOU WILL NOT BE PERMITTED TO DRIVE OR TO LEAVE THE HOSPITAL ALONE AFTER SURGERY.   If you are being discharged the same day, it is  strongly recommended that you arrange for someone to remain with you for the first 24 hrs following your surgery.    The Surgeon will speak to your family/visitor after your surgery regarding the outcome of your surgery and post op care.  The Surgeon may speak to you after your surgery, but there is a possibility you may not remember the details.  Please check with your family members regarding the conversation with the Surgeon.    We strongly recommend whoever is bringing you home be present for discharge instructions.  This will ensure a thorough understanding for your post op home care.    ALL CHILDREN MUST ALWAYS BE ACCOMPANIED BY AN ADULT.    Visitors-Refer to current Visitor policy handouts.    Thank you for your cooperation.  The Staff of [facility].            Bathing Instructions with Hibiclens    Shower the evening before and morning of your procedure with Chlorhexidine (Hibiclens)  do not use Chlorhexidine on your face or genitals. Do not get in your eyes.  Wash your face with water and your regular face wash/soap  Use your regular shampoo  Apply Chlorhexidine (Hibiclens) directly on your skin or on a wet washcloth and wash gently. When showering: Move away from the shower stream when applying Chlorhexidine (Hibiclens) to avoid rinsing off too soon.  Rinse thoroughly with warm water  Do not dilute Chlorhexidine (Hibiclens)   Dry off as usual, do not use any deodorant, powder, body lotions, perfume, after shave or cologne.

## 2023-05-08 NOTE — ANESTHESIA PREPROCEDURE EVALUATION
05/08/2023  Irene Lawton is a 62 y.o., female.      Pre-op Assessment    I have reviewed the Patient Summary Reports.     I have reviewed the Nursing Notes. I have reviewed the NPO Status.   I have reviewed the Medications.     Review of Systems  Anesthesia Hx:  Denies Family Hx of Anesthesia complications.   Denies Personal Hx of Anesthesia complications.   Cardiovascular:   Hypertension    Neurological:   Neuromuscular Disease,        Physical Exam  General: Well nourished, Cooperative, Alert and Oriented    Airway:  Mallampati: II   Mouth Opening: Normal  TM Distance: Normal  Tongue: Normal  Neck ROM: Normal ROM    Dental:  Intact        Anesthesia Plan  Type of Anesthesia, risks & benefits discussed:    Anesthesia Type: Gen ETT  Intra-op Monitoring Plan: Standard ASA Monitors  Post Op Pain Control Plan: multimodal analgesia  Induction:  IV  Airway Plan: Video  Informed Consent: Informed consent signed with the Patient and all parties understand the risks and agree with anesthesia plan.  All questions answered.   ASA Score: 2  Anesthesia Plan Notes: Recent labs ok, type and screen today    Ready For Surgery From Anesthesia Perspective.     .

## 2023-05-09 NOTE — PROGRESS NOTES
Urogyn PRE-OP  2023    [facility] UROGYNECOLOG[facility]  [address] [id number]    Irene Lawton  [id number]  1960    Irene Lawton is a 62 y.o.  here for a urogyn pre-op appointment and cystometry.  Issues include:  Patient Active Problem List   Diagnosis    Encounter for screening colonoscopy    Screen for colon cancer    Coordination impairment    Pelvic floor dysfunction       HPI:  1. Urge incontinence, rare  --has improved with pessary  --voiding every 3 hours during the day  --only 1/ night     2. Prolapse: Stage 2 apical prolapse, Stage 2 anterior and posterior vaginal wall prolapse   --using #5 ring with support --has been out x 4 days  --denies pain, bleeding, or discharge  --here to discuss surgical options     3. Constipation:  --denies constipation  --taking fiber and has increased hydration     4. Vaginal atrophy (dryness):    --not using vaginal estrogen cream twice weekly    Office Cystometry:   PVR: 20 cc    1st sensation: 50  2nd sensation: 150  3rd sensation: 220  4th sensation: 300    KIM: (--) with strong cough x 2 and valsalva    Pelvic US: 23  Uterus:     Size: 7.1 x 2.9 x 5.3 cm     Masses: None     Endometrium: Normal in this post menopausal patient, measuring 2 mm.  There is trace fluid within the endometrial canal.     Right ovary:     Size: 3.3 x 2.0 x 3.6 cm     Appearance: Benign-appearing cyst 2.9 cm.     Vascular flow: Normal.     Left ovary:     Size: 1.9 x 1.7 x 1.1 cm     Appearance: Normal     Vascular Flow: Normal.     Free Fluid:     None.     Impression:     Endometrial stripe is within normal limits in this postmenopausal patient measuring 2 mm thickness.  There is trace fluid within the endometrial canal which would not necessarily be expected in a postmenopausal patient.  Further evaluation as clinically indicated.     Benign-appearing right ovarian cyst measures 2.9 cm.    POP-Q: Aa +1; Ba +1; C -3; Ap -1; Bp -1; D -6.   Genital hiatus 4, perineal body 2 total vaginal length 10.    Past Medical History:   Diagnosis Date    ALLERGIC RHINITIS     Anemia     Anxiety     Cancer     right neck    Hypertension     Transient elevated blood pressure         Past Surgical History:   Procedure Laterality Date    COLONOSCOPY N/A 12/12/2020    Procedure: COLONOSCOPY;  Surgeon: GHADA Gamez MD;  Location: [address]);  Service: Endoscopy;  Laterality: N/A;  Pt reports family Hx colon cancer (Pt's father )  prep ins. emailed - COVID screening on 12/9/20 [facility]    SKIN BIOPSY      Surgical removal neck cancer Right     TUBAL LIGATION          Review of patient's allergies indicates:   Allergen Reactions    No known allergies           Current Outpatient Medications:     fluticasone propionate (FLONASE) 50 mcg/actuation nasal spray, 1 spray (50 mcg total) by Each Nostril route once daily., Disp: 16 g, Rfl: 11    hydroCHLOROthiazide (HYDRODIURIL) 12.5 MG Tab, Take 1 tablet (12.5 mg total) by mouth once daily., Disp: 30 tablet, Rfl: 11    ibuprofen (ADVIL,MOTRIN) 800 MG tablet, Take 1 tablet (800 mg total) by mouth 3 (three) times daily as needed for Pain., Disp: 90 tablet, Rfl: 0    MULTIVITAMIN ORAL, Take by mouth., Disp: , Rfl:   No current facility-administered medications for this visit.    Social History     Socioeconomic History    Marital status: Single    Number of children: 2    Years of education: 14 years   Occupational History    Occupation: [employer] manager     Employer: [id number]   Tobacco Use    Smoking status: Never    Smokeless tobacco: Never   Substance and Sexual Activity    Alcohol use: Not Currently     Comment: Social    Drug use: No    Sexual activity: Not Currently     Partners: Male     Birth control/protection: Surgical   Social History Narrative    Works at [employer] and does not exercise regularly     Social Determinants of Health     Financial Resource Strain: High Risk    Difficulty of Paying  Living Expenses: Hard   Food Insecurity: Food Insecurity Present    Worried About Running Out of Food in the Last Year: Often true    Ran Out of Food in the Last Year: Sometimes true   Transportation Needs: No Transportation Needs    Lack of Transportation (Medical): No    Lack of Transportation (Non-Medical): No   Physical Activity: Unknown    Days of Exercise per Week: Patient refused    Minutes of Exercise per Session: 20 min   Stress: No Stress Concern Present    Feeling of Stress : Only a little   Social Connections: Unknown    Frequency of Communication with Friends and Family: Twice a week    Frequency of Social Gatherings with Friends and Family: Patient refused    Active Member of Clubs or Organizations: No    Attends Club or Organization Meetings: Patient refused    Marital Status: Never    Housing Stability: Unknown    Unable to Pay for Housing in the Last Year: Patient refused    Unstable Housing in the Last Year: No        ROS:  As per HPI.      Exam  LMP 06/27/2012   General: alert and oriented, no acute distress  Respiratory: normal respiratory effort  Abd: soft, non-tender, non-distended    Pelvic  DEFERRED    Impression  1. Uterovaginal prolapse, incomplete          We reviewed the above issues and discussed options for short-term versus long-term management of her problems.   Plan:   Patient consented for vaginal hysterectomy + possible BSO with VNOTES, uterosacral ligament suspension, possible anterior/posterior repair with perineorrhaphy, possible laparotomy, and cystourethroscopy.   R/B/A reviewed. Specific risks reviewed include:  infection, bleeding, need for blood transfusion, damage to surrounding structures, anesthesia risks, death, heart attack, stroke, mesh erosion/extrusion, pain, dyspareunia, urinary retention, voiding dysfunction, urinary incontinence, exacerbation of urinary urge incontinence, and need for further surgeries.  We reviewed potential for failure of POP defect  repair and need for future surgery, with no way of predicting risk. Alternatives reviewed include: pessary/PT for POP and pessary/periurethral injections/PT/medication for KIM.    T&S, urine HCG on DOS  Preoperative appointment with PCP or cardiology: Yes - Date: 5/4/23  VTE Prophylaxis:  Heparin 5000 u SQ TID (1st dose 2hrs preop) + SCDs  Patient instructed on Magnesium citrate and Chlorahexadine/Dial soap prep to perform day before & AM of surgery.   Proceed to OR for above-mentioned procedure.     Neymar Koehler PA-C  [facility]  Division of Female Pelvic Medicine and Reconstructive Surgery  Department of Obstetrics & Gynecology

## 2023-05-10 ENCOUNTER — OFFICE VISIT (OUTPATIENT)
Dept: UROGYNECOLOGY | Facility: CLINIC | Age: 63
End: 2023-05-10
Payer: COMMERCIAL

## 2023-05-10 VITALS
BODY MASS INDEX: 34.19 KG/M2 | HEART RATE: 67 BPM | SYSTOLIC BLOOD PRESSURE: 173 MMHG | WEIGHT: 186.94 LBS | DIASTOLIC BLOOD PRESSURE: 74 MMHG | OXYGEN SATURATION: 100 %

## 2023-05-10 DIAGNOSIS — N81.2 UTEROVAGINAL PROLAPSE, INCOMPLETE: Primary | ICD-10-CM

## 2023-05-10 DIAGNOSIS — Z98.890 POST-OPERATIVE STATE: ICD-10-CM

## 2023-05-10 PROCEDURE — 99999 PR PBB SHADOW E&M-EST. PATIENT-LVL III: CPT | Mod: PBBFAC,,, | Performed by: PHYSICIAN ASSISTANT

## 2023-05-10 PROCEDURE — 99499 UNLISTED E&M SERVICE: CPT | Mod: S$GLB,,, | Performed by: PHYSICIAN ASSISTANT

## 2023-05-10 PROCEDURE — 99499 NO LOS: ICD-10-PCS | Mod: S$GLB,,, | Performed by: PHYSICIAN ASSISTANT

## 2023-05-10 PROCEDURE — 99999 PR PBB SHADOW E&M-EST. PATIENT-LVL III: ICD-10-PCS | Mod: PBBFAC,,, | Performed by: PHYSICIAN ASSISTANT

## 2023-05-10 RX ORDER — HYDROCODONE BITARTRATE AND ACETAMINOPHEN 10; 325 MG/1; MG/1
1 TABLET ORAL EVERY 6 HOURS PRN
Qty: 30 TABLET | Refills: 0 | Status: SHIPPED | OUTPATIENT
Start: 2023-05-10 | End: 2023-11-02

## 2023-05-10 RX ORDER — DOCUSATE SODIUM 100 MG/1
100 CAPSULE, LIQUID FILLED ORAL 2 TIMES DAILY
Qty: 60 CAPSULE | Refills: 0 | Status: SHIPPED | OUTPATIENT
Start: 2023-05-10 | End: 2024-02-09

## 2023-05-10 RX ORDER — IBUPROFEN 600 MG/1
600 TABLET ORAL EVERY 6 HOURS PRN
Qty: 30 TABLET | Refills: 1 | Status: SHIPPED | OUTPATIENT
Start: 2023-05-10 | End: 2024-02-07 | Stop reason: SDUPTHER

## 2023-05-10 NOTE — PATIENT INSTRUCTIONS
Patient consented for vaginal hysterectomy + possible BSO with VNOTES, uterosacral ligament suspension, possible anterior/posterior repair with perineorrhaphy, possible laparotomy, and cystourethroscopy.   R/B/A reviewed. Specific risks reviewed include:  infection, bleeding, need for blood transfusion, damage to surrounding structures, anesthesia risks, death, heart attack, stroke, mesh erosion/extrusion, pain, dyspareunia, urinary retention, voiding dysfunction, urinary incontinence, exacerbation of urinary urge incontinence, and need for further surgeries.  We reviewed potential for failure of POP defect repair and need for future surgery, with no way of predicting risk. Alternatives reviewed include: pessary/PT for POP and pessary/periurethral injections/PT/medication for KIM.    T&S, urine HCG on DOS  Preoperative appointment with PCP or cardiology: Yes - Date: 5/4/23  VTE Prophylaxis:  Heparin 5000 u SQ TID (1st dose 2hrs preop) + SCDs  Patient instructed on Magnesium citrate and Chlorahexadine/Dial soap prep to perform day before & AM of surgery.   Proceed to OR for above-mentioned procedure.  Remove pessary before surgery!

## 2023-05-12 ENCOUNTER — ANESTHESIA (OUTPATIENT)
Dept: SURGERY | Facility: OTHER | Age: 63
End: 2023-05-12
Payer: COMMERCIAL

## 2023-05-12 ENCOUNTER — HOSPITAL ENCOUNTER (OUTPATIENT)
Facility: OTHER | Age: 63
Discharge: HOME | End: 2023-05-12
Attending: OBSTETRICS & GYNECOLOGY | Admitting: OBSTETRICS & GYNECOLOGY
Payer: COMMERCIAL

## 2023-05-12 VITALS — DIASTOLIC BLOOD PRESSURE: 76 MMHG | SYSTOLIC BLOOD PRESSURE: 127 MMHG

## 2023-05-12 VITALS
DIASTOLIC BLOOD PRESSURE: 57 MMHG | HEART RATE: 75 BPM | RESPIRATION RATE: 16 BRPM | TEMPERATURE: 98 F | SYSTOLIC BLOOD PRESSURE: 126 MMHG | OXYGEN SATURATION: 100 %

## 2023-05-12 DIAGNOSIS — N81.2 UTEROVAGINAL PROLAPSE, INCOMPLETE: ICD-10-CM

## 2023-05-12 DIAGNOSIS — M62.89 PELVIC FLOOR DYSFUNCTION: Primary | ICD-10-CM

## 2023-05-12 DIAGNOSIS — Z98.890 POST-OPERATIVE STATE: ICD-10-CM

## 2023-05-12 PROBLEM — Z90.710 S/P VAGINAL HYSTERECTOMY: Status: ACTIVE | Noted: 2023-05-12

## 2023-05-12 LAB
ANION GAP SERPL CALC-SCNC: 12 MMOL/L (ref 8–16)
BASOPHILS # BLD AUTO: 0.02 K/UL (ref 0–0.2)
BASOPHILS NFR BLD: 0.1 % (ref 0–1.9)
BUN SERPL-MCNC: 13 MG/DL (ref 8–23)
CALCIUM SERPL-MCNC: 8.9 MG/DL (ref 8.7–10.5)
CHLORIDE SERPL-SCNC: 103 MMOL/L (ref 95–110)
CO2 SERPL-SCNC: 23 MMOL/L (ref 23–29)
CREAT SERPL-MCNC: 0.9 MG/DL (ref 0.5–1.4)
DIFFERENTIAL METHOD: ABNORMAL
EOSINOPHIL # BLD AUTO: 0 K/UL (ref 0–0.5)
EOSINOPHIL NFR BLD: 0 % (ref 0–8)
ERYTHROCYTE [DISTWIDTH] IN BLOOD BY AUTOMATED COUNT: 13.5 % (ref 11.5–14.5)
EST. GFR  (NO RACE VARIABLE): >60 ML/MIN/1.73 M^2
GLUCOSE SERPL-MCNC: 179 MG/DL (ref 70–110)
HCT VFR BLD AUTO: 35.3 % (ref 37–48.5)
HGB BLD-MCNC: 11.2 G/DL (ref 12–16)
IMM GRANULOCYTES # BLD AUTO: 0.04 K/UL (ref 0–0.04)
IMM GRANULOCYTES NFR BLD AUTO: 0.3 % (ref 0–0.5)
LYMPHOCYTES # BLD AUTO: 1.2 K/UL (ref 1–4.8)
LYMPHOCYTES NFR BLD: 8.5 % (ref 18–48)
MCH RBC QN AUTO: 28.2 PG (ref 27–31)
MCHC RBC AUTO-ENTMCNC: 31.7 G/DL (ref 32–36)
MCV RBC AUTO: 89 FL (ref 82–98)
MONOCYTES # BLD AUTO: 0.4 K/UL (ref 0.3–1)
MONOCYTES NFR BLD: 3.1 % (ref 4–15)
NEUTROPHILS # BLD AUTO: 12.6 K/UL (ref 1.8–7.7)
NEUTROPHILS NFR BLD: 88 % (ref 38–73)
NRBC BLD-RTO: 0 /100 WBC
PLATELET # BLD AUTO: 248 K/UL (ref 150–450)
PMV BLD AUTO: 9.4 FL (ref 9.2–12.9)
POCT GLUCOSE: 92 MG/DL (ref 70–110)
POTASSIUM SERPL-SCNC: 3.5 MMOL/L (ref 3.5–5.1)
RBC # BLD AUTO: 3.97 M/UL (ref 4–5.4)
SODIUM SERPL-SCNC: 138 MMOL/L (ref 136–145)
WBC # BLD AUTO: 14.3 K/UL (ref 3.9–12.7)

## 2023-05-12 PROCEDURE — 58552 LAPARO-VAG HYST INCL T/O: CPT | Mod: AS,22,, | Performed by: PHYSICIAN ASSISTANT

## 2023-05-12 PROCEDURE — 37000008 HC ANESTHESIA 1ST 15 MINUTES: Performed by: OBSTETRICS & GYNECOLOGY

## 2023-05-12 PROCEDURE — 57260 PR COMBINED ANT/POST COLPORRHAPHY: ICD-10-PCS | Mod: AS,51,, | Performed by: PHYSICIAN ASSISTANT

## 2023-05-12 PROCEDURE — 25000003 PHARM REV CODE 250: Performed by: OBSTETRICS & GYNECOLOGY

## 2023-05-12 PROCEDURE — 88305 TISSUE EXAM BY PATHOLOGIST: ICD-10-PCS | Mod: 26,,, | Performed by: PATHOLOGY

## 2023-05-12 PROCEDURE — 25000003 PHARM REV CODE 250: Performed by: STUDENT IN AN ORGANIZED HEALTH CARE EDUCATION/TRAINING PROGRAM

## 2023-05-12 PROCEDURE — 63600175 PHARM REV CODE 636 W HCPCS: Performed by: ANESTHESIOLOGY

## 2023-05-12 PROCEDURE — 57425 LAPAROSCOPY SURG COLPOPEXY: CPT | Mod: 51,,, | Performed by: OBSTETRICS & GYNECOLOGY

## 2023-05-12 PROCEDURE — D9220A PRA ANESTHESIA: Mod: CRNA,,, | Performed by: STUDENT IN AN ORGANIZED HEALTH CARE EDUCATION/TRAINING PROGRAM

## 2023-05-12 PROCEDURE — D9220A PRA ANESTHESIA: ICD-10-PCS | Mod: CRNA,,, | Performed by: STUDENT IN AN ORGANIZED HEALTH CARE EDUCATION/TRAINING PROGRAM

## 2023-05-12 PROCEDURE — 27201423 OPTIME MED/SURG SUP & DEVICES STERILE SUPPLY: Performed by: OBSTETRICS & GYNECOLOGY

## 2023-05-12 PROCEDURE — C1765 ADHESION BARRIER: HCPCS | Performed by: OBSTETRICS & GYNECOLOGY

## 2023-05-12 PROCEDURE — 71000016 HC POSTOP RECOV ADDL HR: Performed by: OBSTETRICS & GYNECOLOGY

## 2023-05-12 PROCEDURE — 85025 COMPLETE CBC W/AUTO DIFF WBC: CPT | Performed by: PHYSICIAN ASSISTANT

## 2023-05-12 PROCEDURE — 25000003 PHARM REV CODE 250: Performed by: ANESTHESIOLOGY

## 2023-05-12 PROCEDURE — 57260 CMBN ANT PST COLPRHY: CPT | Mod: AS,51,, | Performed by: PHYSICIAN ASSISTANT

## 2023-05-12 PROCEDURE — 88305 TISSUE EXAM BY PATHOLOGIST: CPT | Mod: 26,,, | Performed by: PATHOLOGY

## 2023-05-12 PROCEDURE — 57260 PR COMBINED ANT/POST COLPORRHAPHY: ICD-10-PCS | Mod: 51,,, | Performed by: OBSTETRICS & GYNECOLOGY

## 2023-05-12 PROCEDURE — 63600175 PHARM REV CODE 636 W HCPCS: Performed by: STUDENT IN AN ORGANIZED HEALTH CARE EDUCATION/TRAINING PROGRAM

## 2023-05-12 PROCEDURE — 25000003 PHARM REV CODE 250: Performed by: NURSE ANESTHETIST, CERTIFIED REGISTERED

## 2023-05-12 PROCEDURE — 88302 TISSUE EXAM BY PATHOLOGIST: CPT | Performed by: PATHOLOGY

## 2023-05-12 PROCEDURE — 58552 PR LAP,VAG HYST,UTERUS 250GMS/<,SALP-OOPH: ICD-10-PCS | Mod: 22,,, | Performed by: OBSTETRICS & GYNECOLOGY

## 2023-05-12 PROCEDURE — 57425 PR LAPAROSCOPY, SURG, COLPOPEXY: ICD-10-PCS | Mod: 51,,, | Performed by: OBSTETRICS & GYNECOLOGY

## 2023-05-12 PROCEDURE — 57260 CMBN ANT PST COLPRHY: CPT | Mod: 51,,, | Performed by: OBSTETRICS & GYNECOLOGY

## 2023-05-12 PROCEDURE — D9220A PRA ANESTHESIA: Mod: ANES,,, | Performed by: ANESTHESIOLOGY

## 2023-05-12 PROCEDURE — 63600175 PHARM REV CODE 636 W HCPCS: Performed by: NURSE ANESTHETIST, CERTIFIED REGISTERED

## 2023-05-12 PROCEDURE — D9220A PRA ANESTHESIA: ICD-10-PCS | Mod: ANES,,, | Performed by: ANESTHESIOLOGY

## 2023-05-12 PROCEDURE — 36000710: Performed by: OBSTETRICS & GYNECOLOGY

## 2023-05-12 PROCEDURE — 71000039 HC RECOVERY, EACH ADD'L HOUR: Performed by: OBSTETRICS & GYNECOLOGY

## 2023-05-12 PROCEDURE — 36000711: Performed by: OBSTETRICS & GYNECOLOGY

## 2023-05-12 PROCEDURE — 57425 PR LAPAROSCOPY, SURG, COLPOPEXY: ICD-10-PCS | Mod: AS,51,, | Performed by: PHYSICIAN ASSISTANT

## 2023-05-12 PROCEDURE — 88302 PR  SURG PATH,LEVEL II: ICD-10-PCS | Mod: 26,,, | Performed by: PATHOLOGY

## 2023-05-12 PROCEDURE — 71000015 HC POSTOP RECOV 1ST HR: Performed by: OBSTETRICS & GYNECOLOGY

## 2023-05-12 PROCEDURE — 58552 PR LAP,VAG HYST,UTERUS 250GMS/<,SALP-OOPH: ICD-10-PCS | Mod: AS,22,, | Performed by: PHYSICIAN ASSISTANT

## 2023-05-12 PROCEDURE — 37000009 HC ANESTHESIA EA ADD 15 MINS: Performed by: OBSTETRICS & GYNECOLOGY

## 2023-05-12 PROCEDURE — 57425 LAPAROSCOPY SURG COLPOPEXY: CPT | Mod: AS,51,, | Performed by: PHYSICIAN ASSISTANT

## 2023-05-12 PROCEDURE — 88302 TISSUE EXAM BY PATHOLOGIST: CPT | Mod: 26,,, | Performed by: PATHOLOGY

## 2023-05-12 PROCEDURE — 80048 BASIC METABOLIC PNL TOTAL CA: CPT | Performed by: PHYSICIAN ASSISTANT

## 2023-05-12 PROCEDURE — 36415 COLL VENOUS BLD VENIPUNCTURE: CPT | Performed by: PHYSICIAN ASSISTANT

## 2023-05-12 PROCEDURE — 88305 TISSUE EXAM BY PATHOLOGIST: CPT | Mod: 59 | Performed by: PATHOLOGY

## 2023-05-12 PROCEDURE — 71000033 HC RECOVERY, INTIAL HOUR: Performed by: OBSTETRICS & GYNECOLOGY

## 2023-05-12 PROCEDURE — 58552 LAPARO-VAG HYST INCL T/O: CPT | Mod: 22,,, | Performed by: OBSTETRICS & GYNECOLOGY

## 2023-05-12 DEVICE — BARRIER INTERCEED 3X4 ST DIS: Type: IMPLANTABLE DEVICE | Site: VAGINA | Status: FUNCTIONAL

## 2023-05-12 RX ORDER — ONDANSETRON 8 MG/1
8 TABLET, ORALLY DISINTEGRATING ORAL EVERY 8 HOURS PRN
Status: DISCONTINUED | OUTPATIENT
Start: 2023-05-12 | End: 2023-05-12 | Stop reason: HOSPADM

## 2023-05-12 RX ORDER — IBUPROFEN 600 MG/1
600 TABLET ORAL EVERY 6 HOURS PRN
Status: CANCELLED | OUTPATIENT
Start: 2023-05-12

## 2023-05-12 RX ORDER — MEPERIDINE HYDROCHLORIDE 25 MG/ML
12.5 INJECTION INTRAMUSCULAR; INTRAVENOUS; SUBCUTANEOUS ONCE AS NEEDED
Status: DISCONTINUED | OUTPATIENT
Start: 2023-05-12 | End: 2023-05-12 | Stop reason: HOSPADM

## 2023-05-12 RX ORDER — HYDROMORPHONE HYDROCHLORIDE 2 MG/ML
0.4 INJECTION, SOLUTION INTRAMUSCULAR; INTRAVENOUS; SUBCUTANEOUS EVERY 5 MIN PRN
Status: DISCONTINUED | OUTPATIENT
Start: 2023-05-12 | End: 2023-05-12 | Stop reason: HOSPADM

## 2023-05-12 RX ORDER — HYDROMORPHONE HYDROCHLORIDE 2 MG/ML
1 INJECTION, SOLUTION INTRAMUSCULAR; INTRAVENOUS; SUBCUTANEOUS EVERY 6 HOURS PRN
Status: CANCELLED | OUTPATIENT
Start: 2023-05-12

## 2023-05-12 RX ORDER — ROCURONIUM BROMIDE 10 MG/ML
INJECTION, SOLUTION INTRAVENOUS
Status: DISCONTINUED | OUTPATIENT
Start: 2023-05-12 | End: 2023-05-12

## 2023-05-12 RX ORDER — PROCHLORPERAZINE EDISYLATE 5 MG/ML
5 INJECTION INTRAMUSCULAR; INTRAVENOUS EVERY 30 MIN PRN
Status: DISCONTINUED | OUTPATIENT
Start: 2023-05-12 | End: 2023-05-12 | Stop reason: HOSPADM

## 2023-05-12 RX ORDER — LIDOCAINE HYDROCHLORIDE 10 MG/ML
0.5 INJECTION, SOLUTION EPIDURAL; INFILTRATION; INTRACAUDAL; PERINEURAL ONCE
Status: DISCONTINUED | OUTPATIENT
Start: 2023-05-12 | End: 2023-05-12 | Stop reason: HOSPADM

## 2023-05-12 RX ORDER — SODIUM CHLORIDE, SODIUM LACTATE, POTASSIUM CHLORIDE, CALCIUM CHLORIDE 600; 310; 30; 20 MG/100ML; MG/100ML; MG/100ML; MG/100ML
INJECTION, SOLUTION INTRAVENOUS CONTINUOUS
Status: DISCONTINUED | OUTPATIENT
Start: 2023-05-12 | End: 2023-05-12 | Stop reason: HOSPADM

## 2023-05-12 RX ORDER — CYCLOBENZAPRINE HCL 5 MG
10 TABLET ORAL ONCE
Status: COMPLETED | OUTPATIENT
Start: 2023-05-12 | End: 2023-05-12

## 2023-05-12 RX ORDER — DIPHENHYDRAMINE HCL 25 MG
25 CAPSULE ORAL EVERY 4 HOURS PRN
Status: CANCELLED | OUTPATIENT
Start: 2023-05-12

## 2023-05-12 RX ORDER — SODIUM CHLORIDE 9 MG/ML
INJECTION, SOLUTION INTRAVENOUS CONTINUOUS
Status: DISCONTINUED | OUTPATIENT
Start: 2023-05-12 | End: 2023-05-12 | Stop reason: HOSPADM

## 2023-05-12 RX ORDER — PHENYLEPHRINE HYDROCHLORIDE 10 MG/ML
INJECTION INTRAVENOUS
Status: DISCONTINUED | OUTPATIENT
Start: 2023-05-12 | End: 2023-05-12

## 2023-05-12 RX ORDER — BUPIVACAINE HYDROCHLORIDE 5 MG/ML
INJECTION, SOLUTION EPIDURAL; INTRACAUDAL
Status: DISCONTINUED | OUTPATIENT
Start: 2023-05-12 | End: 2023-05-12 | Stop reason: HOSPADM

## 2023-05-12 RX ORDER — HYDROCODONE BITARTRATE AND ACETAMINOPHEN 5; 325 MG/1; MG/1
1 TABLET ORAL EVERY 4 HOURS PRN
Status: CANCELLED | OUTPATIENT
Start: 2023-05-12

## 2023-05-12 RX ORDER — KETOROLAC TROMETHAMINE 30 MG/ML
INJECTION, SOLUTION INTRAMUSCULAR; INTRAVENOUS
Status: DISCONTINUED | OUTPATIENT
Start: 2023-05-12 | End: 2023-05-12

## 2023-05-12 RX ORDER — DOCUSATE SODIUM 100 MG/1
100 CAPSULE, LIQUID FILLED ORAL 2 TIMES DAILY
Status: CANCELLED | OUTPATIENT
Start: 2023-05-12

## 2023-05-12 RX ORDER — PROPOFOL 10 MG/ML
VIAL (ML) INTRAVENOUS
Status: DISCONTINUED | OUTPATIENT
Start: 2023-05-12 | End: 2023-05-12

## 2023-05-12 RX ORDER — OXYCODONE AND ACETAMINOPHEN 10; 325 MG/1; MG/1
1 TABLET ORAL EVERY 4 HOURS PRN
Status: CANCELLED | OUTPATIENT
Start: 2023-05-12

## 2023-05-12 RX ORDER — PROCHLORPERAZINE EDISYLATE 5 MG/ML
5 INJECTION INTRAMUSCULAR; INTRAVENOUS EVERY 6 HOURS PRN
Status: DISCONTINUED | OUTPATIENT
Start: 2023-05-12 | End: 2023-05-12 | Stop reason: HOSPADM

## 2023-05-12 RX ORDER — DIPHENHYDRAMINE HYDROCHLORIDE 50 MG/ML
25 INJECTION INTRAMUSCULAR; INTRAVENOUS EVERY 6 HOURS PRN
Status: DISCONTINUED | OUTPATIENT
Start: 2023-05-12 | End: 2023-05-12 | Stop reason: HOSPADM

## 2023-05-12 RX ORDER — LIDOCAINE HYDROCHLORIDE 20 MG/ML
INJECTION INTRAVENOUS
Status: DISCONTINUED | OUTPATIENT
Start: 2023-05-12 | End: 2023-05-12

## 2023-05-12 RX ORDER — CEFAZOLIN SODIUM 1 G/3ML
INJECTION, POWDER, FOR SOLUTION INTRAMUSCULAR; INTRAVENOUS
Status: DISCONTINUED | OUTPATIENT
Start: 2023-05-12 | End: 2023-05-12

## 2023-05-12 RX ORDER — OXYCODONE HYDROCHLORIDE 5 MG/1
5 TABLET ORAL
Status: DISCONTINUED | OUTPATIENT
Start: 2023-05-12 | End: 2023-05-12 | Stop reason: HOSPADM

## 2023-05-12 RX ORDER — FENTANYL CITRATE 50 UG/ML
INJECTION, SOLUTION INTRAMUSCULAR; INTRAVENOUS
Status: DISCONTINUED | OUTPATIENT
Start: 2023-05-12 | End: 2023-05-12

## 2023-05-12 RX ORDER — DEXAMETHASONE SODIUM PHOSPHATE 4 MG/ML
INJECTION, SOLUTION INTRA-ARTICULAR; INTRALESIONAL; INTRAMUSCULAR; INTRAVENOUS; SOFT TISSUE
Status: DISCONTINUED | OUTPATIENT
Start: 2023-05-12 | End: 2023-05-12

## 2023-05-12 RX ORDER — VASOPRESSIN 20 [USP'U]/ML
INJECTION, SOLUTION INTRAMUSCULAR; SUBCUTANEOUS
Status: DISCONTINUED | OUTPATIENT
Start: 2023-05-12 | End: 2023-05-12 | Stop reason: HOSPADM

## 2023-05-12 RX ORDER — SODIUM CHLORIDE 0.9 % (FLUSH) 0.9 %
3 SYRINGE (ML) INJECTION
Status: DISCONTINUED | OUTPATIENT
Start: 2023-05-12 | End: 2023-05-12 | Stop reason: HOSPADM

## 2023-05-12 RX ORDER — KETAMINE HCL IN 0.9 % NACL 50 MG/5 ML
SYRINGE (ML) INTRAVENOUS
Status: DISCONTINUED | OUTPATIENT
Start: 2023-05-12 | End: 2023-05-12

## 2023-05-12 RX ORDER — ONDANSETRON 2 MG/ML
INJECTION INTRAMUSCULAR; INTRAVENOUS
Status: DISCONTINUED | OUTPATIENT
Start: 2023-05-12 | End: 2023-05-12

## 2023-05-12 RX ORDER — EPHEDRINE SULFATE 50 MG/ML
INJECTION, SOLUTION INTRAVENOUS
Status: DISCONTINUED | OUTPATIENT
Start: 2023-05-12 | End: 2023-05-12

## 2023-05-12 RX ORDER — DIPHENHYDRAMINE HYDROCHLORIDE 50 MG/ML
25 INJECTION INTRAMUSCULAR; INTRAVENOUS EVERY 4 HOURS PRN
Status: CANCELLED | OUTPATIENT
Start: 2023-05-12

## 2023-05-12 RX ORDER — OXYBUTYNIN CHLORIDE 5 MG/1
5 TABLET ORAL 3 TIMES DAILY PRN
Qty: 30 TABLET | Refills: 0 | Status: SHIPPED | OUTPATIENT
Start: 2023-05-12 | End: 2023-11-02

## 2023-05-12 RX ORDER — FAMOTIDINE 20 MG/1
20 TABLET, FILM COATED ORAL
Status: COMPLETED | OUTPATIENT
Start: 2023-05-12 | End: 2023-05-12

## 2023-05-12 RX ORDER — MUPIROCIN 20 MG/G
OINTMENT TOPICAL
Status: DISCONTINUED | OUTPATIENT
Start: 2023-05-12 | End: 2023-05-12 | Stop reason: HOSPADM

## 2023-05-12 RX ORDER — ACETAMINOPHEN 10 MG/ML
1000 INJECTION, SOLUTION INTRAVENOUS ONCE
Status: COMPLETED | OUTPATIENT
Start: 2023-05-12 | End: 2023-05-12

## 2023-05-12 RX ORDER — SULFAMETHOXAZOLE AND TRIMETHOPRIM 800; 160 MG/1; MG/1
1 TABLET ORAL DAILY
Qty: 10 TABLET | Refills: 0 | Status: SHIPPED | OUTPATIENT
Start: 2023-05-12 | End: 2024-02-09

## 2023-05-12 RX ORDER — MIDAZOLAM HYDROCHLORIDE 1 MG/ML
INJECTION INTRAMUSCULAR; INTRAVENOUS
Status: DISCONTINUED | OUTPATIENT
Start: 2023-05-12 | End: 2023-05-12

## 2023-05-12 RX ADMIN — SODIUM CHLORIDE, SODIUM LACTATE, POTASSIUM CHLORIDE, AND CALCIUM CHLORIDE: 600; 310; 30; 20 INJECTION, SOLUTION INTRAVENOUS at 07:05

## 2023-05-12 RX ADMIN — CYCLOBENZAPRINE HYDROCHLORIDE 10 MG: 5 TABLET, FILM COATED ORAL at 03:05

## 2023-05-12 RX ADMIN — PROPOFOL 200 MG: 10 INJECTION, EMULSION INTRAVENOUS at 07:05

## 2023-05-12 RX ADMIN — EPHEDRINE SULFATE 5 MG: 50 INJECTION INTRAVENOUS at 09:05

## 2023-05-12 RX ADMIN — Medication 30 MG: at 07:05

## 2023-05-12 RX ADMIN — OXYCODONE HYDROCHLORIDE 5 MG: 5 TABLET ORAL at 07:05

## 2023-05-12 RX ADMIN — SODIUM CHLORIDE, SODIUM LACTATE, POTASSIUM CHLORIDE, AND CALCIUM CHLORIDE: 600; 310; 30; 20 INJECTION, SOLUTION INTRAVENOUS at 08:05

## 2023-05-12 RX ADMIN — MIDAZOLAM HYDROCHLORIDE 2 MG: 1 INJECTION, SOLUTION INTRAMUSCULAR; INTRAVENOUS at 07:05

## 2023-05-12 RX ADMIN — DEXAMETHASONE SODIUM PHOSPHATE 8 MG: 4 INJECTION, SOLUTION INTRAMUSCULAR; INTRAVENOUS at 07:05

## 2023-05-12 RX ADMIN — KETOROLAC TROMETHAMINE 30 MG: 30 INJECTION, SOLUTION INTRAMUSCULAR; INTRAVENOUS at 12:05

## 2023-05-12 RX ADMIN — HYDROMORPHONE HYDROCHLORIDE 0.4 MG: 2 INJECTION INTRAMUSCULAR; INTRAVENOUS; SUBCUTANEOUS at 02:05

## 2023-05-12 RX ADMIN — EPHEDRINE SULFATE 10 MG: 50 INJECTION INTRAVENOUS at 07:05

## 2023-05-12 RX ADMIN — FENTANYL CITRATE 100 MCG: 50 INJECTION, SOLUTION INTRAMUSCULAR; INTRAVENOUS at 07:05

## 2023-05-12 RX ADMIN — FAMOTIDINE 20 MG: 20 TABLET ORAL at 06:05

## 2023-05-12 RX ADMIN — CEFAZOLIN 2 G: 330 INJECTION, POWDER, FOR SOLUTION INTRAMUSCULAR; INTRAVENOUS at 07:05

## 2023-05-12 RX ADMIN — GLYCOPYRROLATE 0.2 MG: 0.2 INJECTION, SOLUTION INTRAMUSCULAR; INTRAVITREAL at 07:05

## 2023-05-12 RX ADMIN — FENTANYL CITRATE 25 MCG: 50 INJECTION, SOLUTION INTRAMUSCULAR; INTRAVENOUS at 12:05

## 2023-05-12 RX ADMIN — Medication 10 MG: at 08:05

## 2023-05-12 RX ADMIN — ROCURONIUM BROMIDE 50 MG: 10 INJECTION INTRAVENOUS at 07:05

## 2023-05-12 RX ADMIN — MUPIROCIN: 20 OINTMENT TOPICAL at 06:05

## 2023-05-12 RX ADMIN — SUGAMMADEX 200 MG: 100 INJECTION, SOLUTION INTRAVENOUS at 12:05

## 2023-05-12 RX ADMIN — OXYCODONE HYDROCHLORIDE 5 MG: 5 TABLET ORAL at 02:05

## 2023-05-12 RX ADMIN — ROCURONIUM BROMIDE 10 MG: 10 INJECTION INTRAVENOUS at 11:05

## 2023-05-12 RX ADMIN — ONDANSETRON HYDROCHLORIDE 4 MG: 2 INJECTION INTRAMUSCULAR; INTRAVENOUS at 12:05

## 2023-05-12 RX ADMIN — Medication 10 MG: at 09:05

## 2023-05-12 RX ADMIN — ACETAMINOPHEN 1000 MG: 10 INJECTION INTRAVENOUS at 03:05

## 2023-05-12 RX ADMIN — LIDOCAINE HYDROCHLORIDE 80 MG: 20 INJECTION, SOLUTION INTRAVENOUS at 07:05

## 2023-05-12 RX ADMIN — CEFAZOLIN 2 G: 330 INJECTION, POWDER, FOR SOLUTION INTRAMUSCULAR; INTRAVENOUS at 11:05

## 2023-05-12 RX ADMIN — PHENYLEPHRINE HYDROCHLORIDE 100 MCG: 10 INJECTION INTRAVENOUS at 07:05

## 2023-05-12 RX ADMIN — PHENYLEPHRINE HYDROCHLORIDE 100 MCG: 10 INJECTION INTRAVENOUS at 08:05

## 2023-05-12 RX ADMIN — ROCURONIUM BROMIDE 20 MG: 10 INJECTION INTRAVENOUS at 09:05

## 2023-05-12 NOTE — H&P
I have reviewed the below H&P and confirmed that no changes have occurred since it was written. All questions answered.     Marcie Macias M.D.  OB/GYN PGY-4          H&P:  Urogyn PRE-OP  2023     [facility] - UROGYNECOLOGY  [address] [place] [id number]     Irene Lawton  [id number]  1960     Irene Lawton is a 62 y.o.  here for a urogyn pre-op appointment and cystometry.  Issues include:      Patient Active Problem List   Diagnosis    Encounter for screening colonoscopy    Screen for colon cancer    Coordination impairment    Pelvic floor dysfunction         HPI:  1. Urge incontinence, rare  --has improved with pessary  --voiding every 3 hours during the day  --only 1/ night     2. Prolapse: Stage 2 apical prolapse, Stage 2 anterior and posterior vaginal wall prolapse   --using #5 ring with support --has been out x 4 days  --denies pain, bleeding, or discharge  --here to discuss surgical options     3. Constipation:  --denies constipation  --taking fiber and has increased hydration     4. Vaginal atrophy (dryness):    --not using vaginal estrogen cream twice weekly     Office Cystometry:   PVR: 20 cc     1st sensation: 50  2nd sensation: 150  3rd sensation: 220  4th sensation: 300     KIM: (--) with strong cough x 2 and valsalva     Pelvic US: 23  Uterus:     Size: 7.1 x 2.9 x 5.3 cm     Masses: None     Endometrium: Normal in this post menopausal patient, measuring 2 mm.  There is trace fluid within the endometrial canal.     Right ovary:     Size: 3.3 x 2.0 x 3.6 cm     Appearance: Benign-appearing cyst 2.9 cm.     Vascular flow: Normal.     Left ovary:     Size: 1.9 x 1.7 x 1.1 cm     Appearance: Normal     Vascular Flow: Normal.     Free Fluid:     None.     Impression:     Endometrial stripe is within normal limits in this postmenopausal patient measuring 2 mm thickness.  There is trace fluid within the endometrial canal which would not necessarily be  expected in a postmenopausal patient.  Further evaluation as clinically indicated.     Benign-appearing right ovarian cyst measures 2.9 cm.     POP-Q: Aa +1; Ba +1; C -3; Ap -1; Bp -1; D -6.  Genital hiatus 4, perineal body 2 total vaginal length 10.          Past Medical History:   Diagnosis Date    ALLERGIC RHINITIS      Anemia      Anxiety      Cancer       right neck    Hypertension      Transient elevated blood pressure                 Past Surgical History:   Procedure Laterality Date    COLONOSCOPY N/A 12/12/2020     Procedure: COLONOSCOPY;  Surgeon: GHADA Gamez MD;  Location: [facility] ([address] [place] [address]);  Service: Endoscopy;  Laterality: N/A;  Pt reports family Hx colon cancer (Pt's father )  prep ins. emailed - COVID screening on 12/9/20 [facility]    SKIN BIOPSY        Surgical removal neck cancer Right      TUBAL LIGATION                  Review of patient's allergies indicates:   Allergen Reactions    No known allergies              Current Outpatient Medications:     fluticasone propionate (FLONASE) 50 mcg/actuation nasal spray, 1 spray (50 mcg total) by Each Nostril route once daily., Disp: 16 g, Rfl: 11    hydroCHLOROthiazide (HYDRODIURIL) 12.5 MG Tab, Take 1 tablet (12.5 mg total) by mouth once daily., Disp: 30 tablet, Rfl: 11    ibuprofen (ADVIL,MOTRIN) 800 MG tablet, Take 1 tablet (800 mg total) by mouth 3 (three) times daily as needed for Pain., Disp: 90 tablet, Rfl: 0    MULTIVITAMIN ORAL, Take by mouth., Disp: , Rfl:   No current facility-administered medications for this visit.     Social History               Socioeconomic History    Marital status: Single    Number of children: 2    Years of education: 14 years   Occupational History    Occupation: [employer] manager       Employer: [id number]   Tobacco Use    Smoking status: Never    Smokeless tobacco: Never   Substance and Sexual Activity    Alcohol use: Not Currently       Comment: Social    Drug use: No    Sexual activity: Not  Currently       Partners: Male       Birth control/protection: Surgical   Social History Narrative     Works at [employer] and does not exercise regularly      Social Determinants of Health          Financial Resource Strain: High Risk    Difficulty of Paying Living Expenses: Hard   Food Insecurity: Food Insecurity Present    Worried About Running Out of Food in the Last Year: Often true    Ran Out of Food in the Last Year: Sometimes true   Transportation Needs: No Transportation Needs    Lack of Transportation (Medical): No    Lack of Transportation (Non-Medical): No   Physical Activity: Unknown    Days of Exercise per Week: Patient refused    Minutes of Exercise per Session: 20 min   Stress: No Stress Concern Present    Feeling of Stress : Only a little   Social Connections: Unknown    Frequency of Communication with Friends and Family: Twice a week    Frequency of Social Gatherings with Friends and Family: Patient refused    Active Member of Clubs or Organizations: No    Attends Club or Organization Meetings: Patient refused    Marital Status: Never    Housing Stability: Unknown    Unable to Pay for Housing in the Last Year: Patient refused    Unstable Housing in the Last Year: No            ROS:  As per HPI.       Exam  LMP 06/27/2012   General: alert and oriented, no acute distress  Respiratory: normal respiratory effort  Abd: soft, non-tender, non-distended     Pelvic  DEFERRED     Impression  1. Uterovaginal prolapse, incomplete             We reviewed the above issues and discussed options for short-term versus long-term management of her problems.   Plan:   Patient consented for vaginal hysterectomy + possible BSO with VNOTES, uterosacral ligament suspension, possible anterior/posterior repair with perineorrhaphy, possible laparotomy, and cystourethroscopy.   R/B/A reviewed. Specific risks reviewed include:  infection, bleeding, need for blood transfusion, damage to surrounding structures, anesthesia  risks, death, heart attack, stroke, mesh erosion/extrusion, pain, dyspareunia, urinary retention, voiding dysfunction, urinary incontinence, exacerbation of urinary urge incontinence, and need for further surgeries.  We reviewed potential for failure of POP defect repair and need for future surgery, with no way of predicting risk. Alternatives reviewed include: pessary/PT for POP and pessary/periurethral injections/PT/medication for KIM.    T&S, urine HCG on DOS  Preoperative appointment with PCP or cardiology: Yes - Date: 5/4/23  VTE Prophylaxis:  Heparin 5000 u SQ TID (1st dose 2hrs preop) + SCDs  Patient instructed on Magnesium citrate and Chlorahexadine/Dial soap prep to perform day before & AM of surgery.   Proceed to OR for above-mentioned procedure.     Neymar Koehler PA-C  [facility]  Division of Female Pelvic Medicine and Reconstructive Surgery  Department of Obstetrics & Gynecology

## 2023-05-12 NOTE — OP NOTE
THIS IS A CONFIDENTIAL AND PRIVILEGED COMMUNICATION  PLEASE DISPOSE OF PAPER COPIES APPROPRIATELY  [facility], [place]  OPERATIVE REPORT      PATIENT NAME: Irene Lawton     MEDICAL RECORD NUMBER: [id number]    PROCEDURE DATE: 05/12/2023    PROCEDURE:   1. Vaginal assisted laparoscopic hysterectomy (VNotes) ( Modifier 22)  2 Laparoscopic  Bilateral salpingoophrectomy ooprherctomy   3. Bilateral high uterosacral ligament suspension  4.  Anterior Colporrhapy   5. Lysis of adhesions   6. Posterior Colporrhaphy and Perineorrhaphy       ATTENDING SURGEON: Dr. Parham     ASSISTANT:   Marcie PGY 4  Cee SANTANA no qualified resident available     PREOPERATIVE DIAGNOSIS:  1. Symptomatic 2 stage  anterior and posterior vaginal wall prolapse  2. Symptomatic 2 stage Uterovaginal prolapse  3. Pelvic pressure       POSTOPERATIVE DIAGNOSIS:  1. Symptomatic stage 2 anterior and posterior vaginal wall prolapse  2. Symptomatic stage 3  Uterovaginal prolapse  3. Pelvic pressure   4.  Left and  right ovary adhered to the pelvic side wall.  5. Sigmoid colon adhesions     5. Redundant bowel    ANESTHESIA: GET    WOUND CLASSIFICATION: Clean-Contaminated    ANTIBIOTIC PROPHYLAXIS: Kefzol     FINDINGS:    8 week anteverted uterus,   Left and right adnexa adhered to the side wall noted to be high and densely adhered to the sigmoid colon the left. Right paratubal cyst.     SPECIMENS SENT:    1. Uterus and cervix  2. Right tube and ovary with paratubal cyst  3. Left tube tube and ovary   4. Anterior vaginal epithelium   5. Posterior vaginal epithelium     COMPLICATIONS: None     DRAINS: Woodard  600  mL- blue green urine     ESTIMATED BLOOD LOSS:  150 mL    IV FLUIDS:   1900 mL LR    POSTOP CONDITION: Stable    PROCEDURE:    Specimen (Bacteriological, Pathological or other):  None     Prosthetic Device/Implant:  None     Surgeons Narrative:  Surgeon: Lulu Parham DO     Findings:   1. On  exam under anesthesia, normal external female genitalia. There was prolapse as previously noted in clinic. Adnexa: normal bimanual exam.  Adhesions of bladder both adnexa.     2. On cystoscopy, the bladder mucosa was Normal. The ureteral orifices were visualized bilaterally with (+) noted good efflux x 2.   On a systematic survey of the bladder dome to the base of the urethrovesical junction, there was an small bladder cystic lesion at the urethrovesical junction rupture at attempted retrieval no bleeding. No other abnormalities noted. The urethra was normal on retraction of the scope.     The procedure commenced with the same proceudure as a  vaginal hysterectomy. Both the anterior and posterior lips of the cervix were grasped with single-tooth tenaculums. After circumferential injection with the dilute .5 % Marcaine Vasopressin solution, a scapel was then used to circumscribe the cervix at the cervical vaginal junction. A posterior colpotomy incision was created with Brown scissors. The posterior cuff of the vagina was then stitched with a 0 Vicryl suture in running, locking fashion, incorporating the posterior peritoneal edge, to help better establish hemostasis. The long, weighted speculum was then placed into the posterior cul-de-sac after confirm that we were in the intraperitoneal cavity without injury. The bladder was dissected off the cervix and lower uterine segment. The uterosacral ligaments were clamped with Eliza clamps, incised, and ligated with 0 Vicryl suture. This was repeated bilaterally. The cardinal ligaments were then ligated bilaterally, using the same technique. At this point, using sharp dissection with the Brown scissors, the bladder was further dissected off the lower uterine segment until the vesicouterine peritoneal fold was identified and incised.     This was done with some difficulty due to bladder being densely adhered to the vesicouterine peritoneum. Additionally 45 minutes of  dissection was required.  After entering the peritoneal cavity, we were able to palpate the Woodard bulb through the bladder to confirm a lack of cystotomy. The peritoneal tissue was noted to have adhesion attached both anteriorly and posteriorly.      The vaginal Gelport was placed first posteriorly then anteriorly. The intraabdominal pressure was then positioned  to 9 mm Hg.  Attention was then turned to the uterus, which was elevated.  we noted adhesions along the uterine serosa and the sigmoid colon.  Several epiploica were adhered to the adnexa.     The right uterine vessels were grasped with the LigaSure perpendicular to the cervix and at the level of the internal cervical os, coagulated, and cut in several steps with hemostasis noted. We continued up through the broad ligament until we reached the uteroovarian ligament.  We used the monopolar scissors on cold cut to the release additional filmy tissue  and densely adhered tissue to the uterus. We left a very small portion of the uteroovarian ligament on the right to help hold the uterus in place.  Attention was then turned to the left side of the uterus and the same was done. We continue to serially coagulate and cut the broad ligament until we reached the uteroovarian ligament.  The left utero-ovarian ligament was grasped and elevated and cut serially until completely .       Our attention was then turned to the right side where we again continued to serially coagulate and cut t the right utero-ovarian ligament, this allowed the uterus to be completely freed up. We were able to remove the top of the Gelport and remove the uterus.      We turned our attention to the right adnexa and we were able to dissect the right infundibular pelvic ligament with monopolar scissors on cold cut.  We used several laps tied together to push the bowel back to help visualize the adnexa.     The right tube was  densely adhered  to the side wall.   We also identified the  right ureter which was  close to the surgical site.   Ureterolysis was performed which allowed the right tube and ovary to be tried. A paratubal cyst was noted. We were vasiliy to to serially coagulated and cut the IP ligament with good hemostasis noted.  Approximately 25 minutes of dissection was required to free the right side.     We turned our attention to the left side. The left ovary and fallopian tube were grasped and elevated. The course of the left ureter was identified, which was well away from the surgical site.   Sigmoid colon adhesions were taken down with laparoscopic scissors once freed up ovary and tubes were noted. Using the Ligasure we serially coagulated and cut the IP until the left tube and ovary were freed. They were removed and sent to pathology.     At the completion of this part of the case, all pedicles were examined and we used the ligasure to obtain hemastatsis along the peritoneal edges to ensure adequate hemostasis between the most cephalad pedicle (uterovarian pedicle) to the most caudal pedicle (uterosacral ligament). Additionally several figure of 8 sutures were placed to obtain adequate hemostasis.   At the completion of this, there was excellent hemostasis noted bilaterally.       Cystourethroscopy using 23 F 70 degree scope was then peformed, with efflux of urine from both ureteral orifices. No foreign objects, stones, polyps or  diverticulum were noted in the bladder and a normal urethra.     At this point, we proceeded with uterosacral suspension.  The bowel was packed with a moist Kerlix so that the uterosacral ligaments could be visualized bilaterally with care taken not to dislodge any pedicles.  The ischial spines were palpated bilaterally, and the uterosacral ligaments were identified medial and cephalad to these.  We used a long Allis clamp to grasp the right uterosacral ligament approximately at the level of the ischial spine.  A suture of 0 PDS was placed through the  uterosacral ligament at approimately the level of the ischial spine.  The suture was then held with a hemostat.  A suture of 0 Prolene was placed laparoscopically approximately 1 cm cephalad to the PDS suture through the uterosacral ligament.  Again, this suture was tagged and held to be tied later.  These steps were next repeated along the patient's left uterosacral ligament, placing a Prolene suture more cephalad and medial and a 0 PDS suture more lateral and caudal. We did palpate the suture to ensure that adequate bites of uterosacral ligament were attained. Care was taken to avoid the ureters while placing all the above sutures.  After placement of bilateral uterosacral suspension sutures the gas was turned off and the gelport removed, cystourethrscopy was performed.  Intravenous Metheline blue was injected via IV.   Excellent bilateral efflux was noted from bilateral ureteral orifices with all sutures both off and on tension. Again cystourethroscopy failed to demonstrate foreign objects, stones, polyps or  diverticulum were noted in the bladder and a normal urethra.    We then proceeded with anterior vaginal repair. The anterior vaginal epithelium was injected with dilute .5 percent marcaine and vasopressin.   A vertical incision was made from the cuff to the urethrovesicle junction. The vaginal epithelium was dissected off the underlying vesicovaginal fibromuscular connective tissue. At that point, we plicated the vesicovaginal fibromuscular connective tissue using 3.0 PDS stature in a interrupted fashion.Excess vaginal tissue was cut and the vaginal epithelium was re-approximated with 2.0 vicryl.  This resulted in excellent repair of the cystocele.    At this point, attention was returned to the uterosacral suspension sutures. One end of each Medial Prolene suture was placed through the rectovaginal fibromuscular connective tissue posteriorly at approxinately  08:00, with care taken not to come through the  vaginal epithelium. The other ends of each Prolene suture were placed  through the vesicovaginal fibromuscular connective tissue anteriorally at approximately 10:00, with care taken not to come through the vaginal epithelium. One end of each PDS suture was placed through the vaginal epithelium just anterior to 03:00 and 09:00. The other end of each PDS suture was placed through the vaginal epithelium just posterior to 03:00 and 09:00.  The uterosacral ligament suspension sutures were tied down without any difficulty and trimmed.  0-vicryl was used to close the vaginal cuff in starting at each end and meeting in the midline,  resulting in complete closure of the defect and coverage of all sutures.  Excellent hemostasis was noted, and the vagina was irrigated. Cystourethroscopy was again performed.  There was excellent efflux from bilateral ureteral orifices and no obvious suture or other injury to the bladder mucosa.  A Woodard catheter was placed into the bladder under sterile technique.       Finally, we performed the posterior colporrhaphy and perineorrhaphy.  Allis clamps were placed on the left and right hymenal remnants and at the proximal limit of the rectovaginal septal defect along the vaginal mucosa. The perineal skin to be resected for the perineorrhaphy was grasped with several Allis clamps.  The perineum and vaginal mucosa were injected with .5 % dilute marcaine and vasopressin which was distributed beneath the perineal skin and vaginal mucosa both in the midline and in the direction of the fornices. The perineal skin within the outlined area was dissected off the underlying perineal body with Metzenbaum scissors.  The posterior vaginal mucosa was then incised in the midline with Metzenbaum scissors.  The left and right margins of the vaginal mucosa were grasped with Allis clamps and the vaginal mucosa was dissected sharply off of the underlying rectovaginal fibromuscular connective tissue.  Individual  areas of bleeding were made hemostatic with the electrocautery.  The rectovaginal fibromuscular tissue was exposed bilaterally to the margins of the levator ani muscles and plicated in the midline with a series of vertical mattress stitches of 3.0 PDS and 0 Vicryl, bringing together the perineal body in the midline between the hymenal remnants.  A series of vertical mattress stitches were used to plicate the perineal body beneath the perineal skin thereby plicating the superficial and deep transverse perineal muscles and bulbocavernosus muscles.  Care was taken to make sure that vaginal caliber/ introitus allowed 2 - 3 fingerbreadths to be placed without difficulty. There was no significant posterior vaginal wall contracture/ ridging at the completion of the plication. The bed of the repair was copiously irrigated. The vaginal mucosal margins of the vertical incision were trimmed with Metzenbaum scissors on each side and reapproximated with a running, locking stitch of 0 Vicryl.  Excellent hemostasis was observed along the suture line. Vaginal exam confirmed good reinforcement of the rectocele site without ridging or significant contracture of the vault.  Rectovaginal examination was performed with findings as described above. The vagina was packed with premarin packing.       The patient tolerated the procedure well. Needle, sponge lap, and instrument counts were correct x2. The patient was transferred to recovery in stable condition.    Dr. Lulu Parham  was present and participated in the entire procedure.    DICTATED BY Lulu Parham, DO    _________________________

## 2023-05-12 NOTE — DISCHARGE INSTRUCTIONS
Discharge Instructions for Laparoscopic Hysterectomy  You had a procedure called laparoscopic hysterectomy. A surgeon removed your uterus using instruments inserted through small incisions in your abdomen. These incisions may be tender or sore. You may also have pain in your upper back or shoulders. This is from the gas used to enlarge your abdomen to allow your doctor to see inside your pelvis and perform the procedure. This pain usually goes away in a day or two. It usually takes from 1-4 weeks to recover from laparoscopic hysterectomy. Remember, though, that recovery time varies from woman to woman. Here's what you can do to speed your recovery following surgery.    Home Care   Continue the coughing and deep breathing exercises that you learned in the hospital.  Take your medications exactly as directed by your doctor.  Avoid constipation.  Eat fruits, vegetables, and whole grains.  Drink 6-8 glasses of water a day, unless told to do otherwise.  Use a laxative or a mild stool softener if your doctor says it's okay.      VAGINAL DISCHARGE:   You may develop a vaginal discharge and intermittent vaginal spotting after surgery and up to 6 weeks postoperatively.  The discharge may have an odor and may change in color but it is normal.  This is due to dissolving stiches.  Contact your surgical team if you develop vaginal or vulvar irritation along with a discharge.  Also contact your surgical team if you have vaginal discharge that smells like urine or stool.     You may shower in 24 hours and get your incisions wet. Dab your incisions dry with a clean towel.  It is OK if the pieces of tape come off, (steri strips). If they are still in place 1 week after surgery, you should take them off.  You should call your doctor if the incisions become reddened or have foul smelling or bloody drainage. Avoid baths, swimming pools and hot tubs until seen by your physician for a post-op follow up.    Don't use oils, powders,  or lotions on your incisions.  You should not have any sexual activity for six weeks.  This can cause severe bleeding. You should not insert anything into the vagina for six weeks, no tampons or douching.  If you had both ovaries removed, report hot flashes, mood swings, and irritability to your doctor. There may be medications that can help you.    Activity  Ask your doctor when you can start driving again. It's usually okay to drive as soon as you are free of pain and able to move comfortably from side to side. Don't drive while you are still taking narcotic pain medications.  Ask others to help with chores and errands while you recover.  Dont lift anything heavier than 10 pounds for 4 weeks.  Dont vacuum or do other strenuous activities until the doctor says it's okay.  Walk as often as you feel able.  Climb stairs slowly and pause after every few steps.    Follow-Up  Make a follow-up appointment as directed by our staff.     When to Call Your Doctor  Call your doctor right away if you have any of the following:  Fever above 100.4°F (38°C) or chills  Bright red vaginal bleeding or vaginal bleeding that soaks more than one sanitary pad per hour  A foul smelling discharge from the vagina  Trouble urinating or burning when you urinate  Severe pain or bloating in your abdomen  Redness, swelling, or drainage at your incision sites  Shortness of breath or chest pain     An indwelling urinary catheter is a flexible plastic tube that is inserted through the opening that carries urine from the  bladder to the outside of the body (urethra), to drain urine. The tube is kept in place by a small balloon that is inflated  once the tube is securely in the bladder. Urine drains into a bag that is attached to the thigh through this catheter.      To care for your urinary catheter at home:     Make sure that urine is flowing out of the catheter into the drainage bag.   Check the area around the insertion site for signs of  infections (such as inflammation and irritated/swollen/  red/tender skin), and/or leakage of urine around the catheter   Keep the urinary drainage bag below the level of the bladder (to prevent backflow into the bladder).   Make sure that the urinary drainage bag does not drag and pull on the catheter.    Caring for your catheter:     Clean the area around the drainage tube twice each day.   Use a mild soap and water around the drainage tube.   Rinse well and dry with a clean towel.      Draining the urine collection bag:    The bag that collects urine may be strapped to your thigh. You will need to empty the bag at regular intervals,  typically every 2 to 4 hours, or whenever the catheter bag is half-full, and at bedtime.   Wash your hands with soap and water. If you are emptying another persons catheter bag, you may wish to wear  disposable gloves. Wash your hands before you put the gloves on and after removing them.

## 2023-05-12 NOTE — TRANSFER OF CARE
Anesthesia Transfer of Care Note    Patient: Irene Lawton    Procedure(s) Performed: Procedure(s) (LRB):  HYSTERECTOMY, TOTAL, LAPAROSCOPIC (N/A)  SUSPENSION, LIGAMENT, UTEROSACRAL, LAPAROSCOPIC (N/A)  CYSTOSCOPY    Patient location: PACU    Anesthesia Type: general    Transport from OR: Transported from OR on 2-3 L/min O2 by NC with adequate spontaneous ventilation    Post pain: adequate analgesia    Post assessment: no apparent anesthetic complications    Post vital signs: stable    Level of consciousness: awake    Nausea/Vomiting: no nausea/vomiting    Complications: none    Transfer of care protocol was followed      Last vitals:   Visit Vitals  BP (!) 147/67 (BP Location: Left arm, Patient Position: Lying)   Pulse (!) 59   Temp 36.7 °C (98.1 °F) (Oral)   Resp 20   LMP 06/27/2012   SpO2 97%   Breastfeeding No

## 2023-05-12 NOTE — DISCHARGE SUMMARY
[facility] Surgery ([place])  Brief Operative Note     SUMMARY     Surgery Date: 5/12/2023     Surgeon(s) and Role:     * Lulu Parham, DO - Primary     * Marcie Macias MD - Resident - Assisting     * Cee Osorio MD - Resident - Assisting        Pre-op Diagnosis:  Uterovaginal prolapse, incomplete [N81.2]  Prolapse of anterior vaginal wall [N81.10]    Post-op Diagnosis:  Post-Op Diagnosis Codes:     * Uterovaginal prolapse, incomplete [N81.2]     * Prolapse of anterior vaginal wall [N81.10]    Procedure(s) (LRB):  HYSTERECTOMY, TOTAL, LAPAROSCOPIC (N/A)  SUSPENSION, LIGAMENT, UTEROSACRAL, LAPAROSCOPIC (N/A)  CYSTOSCOPY  LYSIS, ADHESIONS (N/A)  COLPORRHAPHY, COMBINED ANTEROPOSTERIOR (N/A)    Anesthesia: General    Findings/Key Components:     8 week anteverted uterus,   Left and right adnexa adhered to the side wall noted to be high and densely adhered to the sigmoid colon the left. Right paratubal cyst.     Estimated Blood Loss: 150 mL    IVF: see anesthesia report    UOP: see anesthesia report    Complications: none         Specimens:   Specimen (24h ago, onward)      None            Discharge Note    SUMMARY     Admit Date: 5/12/2023    Discharge Date: 5/12/2023  8:12 PM    Hospital Course (synopsis of major diagnoses, care, treatment, and services provided during the course of the hospital stay): Patient was admitted for the above mentioned procedure.  Procedure was performed without difficulty.  Please see operative report for further details.  She was transferred to recovery in stable condition and discharged home the same day.      Final Diagnosis: Post-Op Diagnosis Codes:     * Uterovaginal prolapse, incomplete [N81.2]     * Prolapse of anterior vaginal wall [N81.10]    Disposition: Home or Self Care    Follow Up/Patient Instructions: see below    Medications:  Reconciled Home Medications:      Medication List        START taking these medications      oxybutynin 5 MG Tab  Commonly known as:  DITROPAN  Take 1 tablet (5 mg total) by mouth 3 (three) times daily as needed (bladder spasms).     sulfamethoxazole-trimethoprim 800-160mg 800-160 mg Tab  Commonly known as: BACTRIM DS  Take 1 tablet by mouth once daily.            CONTINUE taking these medications      docusate sodium 100 MG capsule  Commonly known as: COLACE  Take 1 capsule (100 mg total) by mouth 2 (two) times daily.     fluticasone propionate 50 mcg/actuation nasal spray  Commonly known as: FLONASE  1 spray (50 mcg total) by Each Nostril route once daily.     hydroCHLOROthiazide 12.5 MG Tab  Commonly known as: HYDRODIURIL  Take 1 tablet (12.5 mg total) by mouth once daily.     HYDROcodone-acetaminophen  mg per tablet  Commonly known as: NORCO  Take 1 tablet by mouth every 6 (six) hours as needed for Pain.     ibuprofen 600 MG tablet  Commonly known as: ADVIL,MOTRIN  Take 1 tablet (600 mg total) by mouth every 6 (six) hours as needed for Pain.     MULTIVITAMIN ORAL  Take by mouth.            Discharge Procedure Orders   Diet general     Pelvic Rest     Lifting restrictions     Call MD for:  temperature >100.4     Call MD for:  persistent nausea and vomiting     Call MD for:  severe uncontrolled pain     Call MD for:  difficulty breathing, headache or visual disturbances     Call MD for:  redness, tenderness, or signs of infection (pain, swelling, redness, odor or green/yellow discharge around incision site)     Call MD for:  hives      Follow-up Information       Lulu Parham DO. Schedule an appointment as soon as possible for a visit in 6 week(s).    Specialty: UroGynecology   Why: Postoperative visit  Contact information:  [address] [place] [id number] [phone]                           Cee Osorio MD/MPH  OB/GYN PGY3

## 2023-05-12 NOTE — OPERATIVE NOTE ADDENDUM
Certification of Assistant at Surgery       Surgery Date: 5/12/2023     Participating Surgeons:  Surgeon(s) and Role:     * Lulu Parham DO - Primary     * Marcie Macias MD - Resident - Assisting     * Cee Osorio MD - Resident - Assisting    Procedures:  Procedure(s) (LRB):  HYSTERECTOMY, TOTAL, LAPAROSCOPIC (N/A)  SUSPENSION, LIGAMENT, UTEROSACRAL, LAPAROSCOPIC (N/A)  CYSTOSCOPY    Assistant Surgeon's Certification of Necessity:  I understand that section 1842 (b) (6) (d) of the Social Security Act generally prohibits Medicare Part B reasonable charge payment for the services of assistants at surgery in teaching hospitals when qualified residents are available to furnish such services. I certify that the services for which payment is claimed were medically necessary, and that no qualified resident was available to perform the services. I further understand that these services are subject to post-payment review by the Medicare carrier.      Neymar Koehler PA-C    05/12/2023  1:31 PM

## 2023-05-12 NOTE — ANESTHESIA POSTPROCEDURE EVALUATION
Anesthesia Post Evaluation    Patient: Irene Lawton    Procedure(s) Performed: Procedure(s) (LRB):  HYSTERECTOMY, TOTAL, LAPAROSCOPIC (N/A)  SUSPENSION, LIGAMENT, UTEROSACRAL, LAPAROSCOPIC (N/A)  CYSTOSCOPY  LYSIS, ADHESIONS (N/A)  COLPORRHAPHY, COMBINED ANTEROPOSTERIOR (N/A)    Final Anesthesia Type: general      Patient location during evaluation: PACU  Patient participation: Yes- Able to Participate  Level of consciousness: awake and alert  Post-procedure vital signs: reviewed and stable  Pain management: adequate  Airway patency: patent    PONV status at discharge: No PONV  Anesthetic complications: no      Cardiovascular status: blood pressure returned to baseline and stable  Respiratory status: room air  Hydration status: euvolemic  Follow-up not needed.          Vitals Value Taken Time   /57 05/12/23 1445   Temp 36.4 °C (97.5 °F) 05/12/23 1351   Pulse 81 05/12/23 1446   Resp 18 05/12/23 1445   SpO2 97 % 05/12/23 1446   Vitals shown include unvalidated device data.      No case tracking events are documented in the log.      Pain/Nae Score: Pain Rating Prior to Med Admin: 8 (5/12/2023  2:42 PM)  Pain Rating Post Med Admin: 6 (5/12/2023  2:20 PM)  Nae Score: 7 (5/12/2023  2:20 PM)

## 2023-05-12 NOTE — ANESTHESIA PROCEDURE NOTES
Intubation    Date/Time: 5/12/2023 7:25 AM  Performed by: Dominic Mosley CRNA  Authorized by: Suman Rutledge MD     Intubation:     Induction:  Intravenous    Intubated:  Postinduction    Mask Ventilation:  Easy mask    Attempts:  1    Attempted By:  CRNA    Method of Intubation:  Video laryngoscopy    Blade:  Zuñiga 3    Laryngeal View Grade: Grade I - full view of cords      Difficult Airway Encountered?: No      Complications:  None    Airway Device:  Oral endotracheal tube    Airway Device Size:  7.5    Style/Cuff Inflation:  Cuffed (inflated to minimal occlusive pressure)    Tube secured:  21    Secured at:  The lips    Placement Verified By:  Capnometry    Complicating Factors:  None    Findings Post-Intubation:  BS equal bilateral and atraumatic/condition of teeth unchanged

## 2023-05-13 NOTE — PLAN OF CARE
Irene Lawton has met all discharge criteria from Phase II. Vital Signs are stable, ambulating  without difficulty.Pain is now under control and tolerable for the pt. Pain score 3 at this time.  Discharge instructions given, patient verbalized understanding. Discharged from facility via wheelchair in stable condition.

## 2023-05-15 ENCOUNTER — PATIENT MESSAGE (OUTPATIENT)
Dept: UROGYNECOLOGY | Facility: CLINIC | Age: 63
End: 2023-05-15
Payer: COMMERCIAL

## 2023-05-15 ENCOUNTER — TELEPHONE (OUTPATIENT)
Dept: UROGYNECOLOGY | Facility: CLINIC | Age: 63
End: 2023-05-15
Payer: COMMERCIAL

## 2023-05-15 NOTE — TELEPHONE ENCOUNTER
----- Message from Sharon Velasquez sent at 5/15/2023  2:19 PM CDT -----  Regarding: paperwork status  Name of Who is Calling:            What is the request in detail: Patient is requesting a call back to find out the status of her FMLA paperwork.           Can the clinic reply by [facility]: Yes           What Number to Call Back if not in [facility]: [phone]

## 2023-05-15 NOTE — TELEPHONE ENCOUNTER
Informed pt the FMLA paperwork does not be completed in office, all paperwork is done electronically. Gave pt disabilty/FMLA forms processing contact information [email or web address] and phone number: [phone], [phone] and fax [phone]. Pt voiced understanding and call ended.

## 2023-05-16 ENCOUNTER — OFFICE VISIT (OUTPATIENT)
Dept: UROGYNECOLOGY | Facility: CLINIC | Age: 63
End: 2023-05-16
Payer: COMMERCIAL

## 2023-05-16 VITALS
DIASTOLIC BLOOD PRESSURE: 80 MMHG | WEIGHT: 189.81 LBS | BODY MASS INDEX: 34.93 KG/M2 | HEIGHT: 62 IN | SYSTOLIC BLOOD PRESSURE: 142 MMHG

## 2023-05-16 DIAGNOSIS — R33.9 INCOMPLETE BLADDER EMPTYING: ICD-10-CM

## 2023-05-16 DIAGNOSIS — Z98.890 POST-OPERATIVE STATE: Primary | ICD-10-CM

## 2023-05-16 PROCEDURE — 99024 PR POST-OP FOLLOW-UP VISIT: ICD-10-PCS | Mod: S$GLB,,, | Performed by: NURSE PRACTITIONER

## 2023-05-16 PROCEDURE — 3008F PR BODY MASS INDEX (BMI) DOCUMENTED: ICD-10-PCS | Mod: CPTII,S$GLB,, | Performed by: NURSE PRACTITIONER

## 2023-05-16 PROCEDURE — 87086 URINE CULTURE/COLONY COUNT: CPT | Performed by: NURSE PRACTITIONER

## 2023-05-16 PROCEDURE — 99999 PR PBB SHADOW E&M-EST. PATIENT-LVL III: ICD-10-PCS | Mod: PBBFAC,,, | Performed by: NURSE PRACTITIONER

## 2023-05-16 PROCEDURE — 1160F PR REVIEW ALL MEDS BY PRESCRIBER/CLIN PHARMACIST DOCUMENTED: ICD-10-PCS | Mod: CPTII,S$GLB,, | Performed by: NURSE PRACTITIONER

## 2023-05-16 PROCEDURE — 1160F RVW MEDS BY RX/DR IN RCRD: CPT | Mod: CPTII,S$GLB,, | Performed by: NURSE PRACTITIONER

## 2023-05-16 PROCEDURE — 99024 POSTOP FOLLOW-UP VISIT: CPT | Mod: S$GLB,,, | Performed by: NURSE PRACTITIONER

## 2023-05-16 PROCEDURE — 3008F BODY MASS INDEX DOCD: CPT | Mod: CPTII,S$GLB,, | Performed by: NURSE PRACTITIONER

## 2023-05-16 PROCEDURE — 99999 PR PBB SHADOW E&M-EST. PATIENT-LVL III: CPT | Mod: PBBFAC,,, | Performed by: NURSE PRACTITIONER

## 2023-05-16 PROCEDURE — 3044F HG A1C LEVEL LT 7.0%: CPT | Mod: CPTII,S$GLB,, | Performed by: NURSE PRACTITIONER

## 2023-05-16 PROCEDURE — 3079F DIAST BP 80-89 MM HG: CPT | Mod: CPTII,S$GLB,, | Performed by: NURSE PRACTITIONER

## 2023-05-16 PROCEDURE — 3044F PR MOST RECENT HEMOGLOBIN A1C LEVEL <7.0%: ICD-10-PCS | Mod: CPTII,S$GLB,, | Performed by: NURSE PRACTITIONER

## 2023-05-16 PROCEDURE — 1159F MED LIST DOCD IN RCRD: CPT | Mod: CPTII,S$GLB,, | Performed by: NURSE PRACTITIONER

## 2023-05-16 PROCEDURE — 3079F PR MOST RECENT DIASTOLIC BLOOD PRESSURE 80-89 MM HG: ICD-10-PCS | Mod: CPTII,S$GLB,, | Performed by: NURSE PRACTITIONER

## 2023-05-16 PROCEDURE — 3077F PR MOST RECENT SYSTOLIC BLOOD PRESSURE >= 140 MM HG: ICD-10-PCS | Mod: CPTII,S$GLB,, | Performed by: NURSE PRACTITIONER

## 2023-05-16 PROCEDURE — 3077F SYST BP >= 140 MM HG: CPT | Mod: CPTII,S$GLB,, | Performed by: NURSE PRACTITIONER

## 2023-05-16 PROCEDURE — 1159F PR MEDICATION LIST DOCUMENTED IN MEDICAL RECORD: ICD-10-PCS | Mod: CPTII,S$GLB,, | Performed by: NURSE PRACTITIONER

## 2023-05-16 NOTE — PROGRESS NOTES
The patient was placed in dorsal lithotomy position with feet in stirrups.    The bladder was filled with 250 mL sterile water to gravity with a 60 mL carranza tipped syringe, at which point she voiced a strong desire to void.  The catheter was removed. She voided 300 mL.  She tolerated the procedure well.    YAAKOV Wiggins-BC

## 2023-05-18 ENCOUNTER — TELEPHONE (OUTPATIENT)
Dept: UROGYNECOLOGY | Facility: CLINIC | Age: 63
End: 2023-05-18
Payer: COMMERCIAL

## 2023-05-18 ENCOUNTER — PATIENT MESSAGE (OUTPATIENT)
Dept: UROGYNECOLOGY | Facility: CLINIC | Age: 63
End: 2023-05-18
Payer: COMMERCIAL

## 2023-05-18 LAB — BACTERIA UR CULT: NO GROWTH

## 2023-05-18 NOTE — TELEPHONE ENCOUNTER
Verbal per NP Pau have pt. Try:     1) Colace as rx'ed,if no relief,  2) Milk of magnesia (4 doses as directed on bottle)if no relief   3)Miralax    Message relayed to pt, pt. Stated she has taken Colace and Miralax with no relief. Pt. Was advised to try Milk of Magnesia as directed and call on tomorrow if constipation persists. Pt. Verbalized understanding and appreciation

## 2023-05-18 NOTE — TELEPHONE ENCOUNTER
----- Message from Mey Sarmiento sent at 5/18/2023 10:27 AM CDT -----  Pt called in to see if she can get something called in for severe constipation. The pt states she has tried ever otc. Pls call and advise.

## 2023-05-19 LAB
FINAL PATHOLOGIC DIAGNOSIS: NORMAL
Lab: NORMAL

## 2023-05-24 ENCOUNTER — OFFICE VISIT (OUTPATIENT)
Dept: UROGYNECOLOGY | Facility: CLINIC | Age: 63
End: 2023-05-24
Payer: COMMERCIAL

## 2023-05-24 VITALS
HEART RATE: 56 BPM | HEIGHT: 62 IN | SYSTOLIC BLOOD PRESSURE: 152 MMHG | DIASTOLIC BLOOD PRESSURE: 70 MMHG | BODY MASS INDEX: 34.12 KG/M2 | WEIGHT: 185.44 LBS

## 2023-05-24 DIAGNOSIS — Z90.710 S/P VAGINAL HYSTERECTOMY: Primary | ICD-10-CM

## 2023-05-24 PROCEDURE — 3044F HG A1C LEVEL LT 7.0%: CPT | Mod: CPTII,S$GLB,, | Performed by: PHYSICIAN ASSISTANT

## 2023-05-24 PROCEDURE — 99024 POSTOP FOLLOW-UP VISIT: CPT | Mod: S$GLB,,, | Performed by: PHYSICIAN ASSISTANT

## 2023-05-24 PROCEDURE — 99024 PR POST-OP FOLLOW-UP VISIT: ICD-10-PCS | Mod: S$GLB,,, | Performed by: PHYSICIAN ASSISTANT

## 2023-05-24 PROCEDURE — 3078F DIAST BP <80 MM HG: CPT | Mod: CPTII,S$GLB,, | Performed by: PHYSICIAN ASSISTANT

## 2023-05-24 PROCEDURE — 99999 PR PBB SHADOW E&M-EST. PATIENT-LVL III: ICD-10-PCS | Mod: PBBFAC,,, | Performed by: PHYSICIAN ASSISTANT

## 2023-05-24 PROCEDURE — 3077F SYST BP >= 140 MM HG: CPT | Mod: CPTII,S$GLB,, | Performed by: PHYSICIAN ASSISTANT

## 2023-05-24 PROCEDURE — 3008F PR BODY MASS INDEX (BMI) DOCUMENTED: ICD-10-PCS | Mod: CPTII,S$GLB,, | Performed by: PHYSICIAN ASSISTANT

## 2023-05-24 PROCEDURE — 3078F PR MOST RECENT DIASTOLIC BLOOD PRESSURE < 80 MM HG: ICD-10-PCS | Mod: CPTII,S$GLB,, | Performed by: PHYSICIAN ASSISTANT

## 2023-05-24 PROCEDURE — 3044F PR MOST RECENT HEMOGLOBIN A1C LEVEL <7.0%: ICD-10-PCS | Mod: CPTII,S$GLB,, | Performed by: PHYSICIAN ASSISTANT

## 2023-05-24 PROCEDURE — 99999 PR PBB SHADOW E&M-EST. PATIENT-LVL III: CPT | Mod: PBBFAC,,, | Performed by: PHYSICIAN ASSISTANT

## 2023-05-24 PROCEDURE — 3008F BODY MASS INDEX DOCD: CPT | Mod: CPTII,S$GLB,, | Performed by: PHYSICIAN ASSISTANT

## 2023-05-24 PROCEDURE — 1159F PR MEDICATION LIST DOCUMENTED IN MEDICAL RECORD: ICD-10-PCS | Mod: CPTII,S$GLB,, | Performed by: PHYSICIAN ASSISTANT

## 2023-05-24 PROCEDURE — 1159F MED LIST DOCD IN RCRD: CPT | Mod: CPTII,S$GLB,, | Performed by: PHYSICIAN ASSISTANT

## 2023-05-24 PROCEDURE — 3077F PR MOST RECENT SYSTOLIC BLOOD PRESSURE >= 140 MM HG: ICD-10-PCS | Mod: CPTII,S$GLB,, | Performed by: PHYSICIAN ASSISTANT

## 2023-05-24 NOTE — PROGRESS NOTES
[facility] - UROGYNECOLOGY  [address] [address]  May 24, 2023     Irene Lawton  [id number]  1960    UROGYN POST-OP  5/24/2023     Irene Lawton is a 62 y.o. here for a 2 week post operative visit.    OPERATIVE NOTE  PROCEDURE DATE: 05/12/2023     PROCEDURE:   1. Vaginal assisted laparoscopic hysterectomy (VNotes) (Modifier 22)  2. Laparoscopic Bilateral salpingoophrectomy ooprherctomy   3. Bilateral high uterosacral ligament suspension  4. Anterior Colporrhapy   5. Lysis of adhesions   6. Posterior Colporrhaphy and Perineorrhaphy      ATTENDING SURGEON: Dr. Parham     ASSISTANT:   Marcie Macias, PGY 4  Cee Osorio, PGY-3     Neymar Koehler PA-C no qualified resident available      PREOPERATIVE DIAGNOSIS:  1. Symptomatic 2 stage  anterior and posterior vaginal wall prolapse  2. Symptomatic 2 stage Uterovaginal prolapse  3. Pelvic pressure      POSTOPERATIVE DIAGNOSIS:  1. Symptomatic stage 2 anterior and posterior vaginal wall prolapse  2. Symptomatic stage 3  Uterovaginal prolapse  3. Pelvic pressure   4. Left and  right ovary adhered to the pelvic side wall.  5. Sigmoid colon adhesions   6. Redundant bowel      HISTORY SINCE LAST VISIT (2 week PO):  Patient is doing well, she has some slight discomfort in her pelvis. Ibuprofen helps. Was having constipation, but took milk of magnesia and it's better, especially since being off pain medication. Minimal spotting, has seen some sutures coming out in her underwear. Denies discharge. Activity level is light,   Bladder issues: None, denies increased urgency, frequency, denies leakage and dysuria.    Bowel issues: None anymore. Not taking stool softeners anymore and says that stool is soft and she is not straining.     Past Medical History:   Diagnosis Date    ALLERGIC RHINITIS     Anemia     Anxiety     Cancer     right neck    Hypertension     Transient elevated blood pressure        Past Surgical History:   Procedure  Laterality Date    COLONOSCOPY N/A 12/12/2020    Procedure: COLONOSCOPY;  Surgeon: GHADA Gamez MD;  Location: [facility] ENDO (4TH FLR);  Service: Endoscopy;  Laterality: N/A;  Pt reports family Hx colon cancer (Pt's father )  prep ins. emailed - COVID screening on 12/9/20 [employer]    COLPORRHAPHY, COMBINED ANTEROPOSTERIOR N/A 5/12/2023    Procedure: COLPORRHAPHY, COMBINED ANTEROPOSTERIOR;  Surgeon: Lulu Parham DO;  Location: [facility] OR;  Service: OB/GYN;  Laterality: N/A;    CYSTOSCOPY  5/12/2023    Procedure: CYSTOSCOPY;  Surgeon: Lulu Parham DO;  Location: [facility] OR;  Service: OB/GYN;;    LAPAROSCOPIC SUSPENSION OF UTEROSACRAL LIGAMENT N/A 5/12/2023    Procedure: SUSPENSION, LIGAMENT, UTEROSACRAL, LAPAROSCOPIC;  Surgeon: Lulu Parham DO;  Location: [facility] OR;  Service: OB/GYN;  Laterality: N/A;    LAPAROSCOPIC TOTAL HYSTERECTOMY N/A 5/12/2023    Procedure: HYSTERECTOMY, TOTAL, LAPAROSCOPIC;  Surgeon: Lulu Parham DO;  Location: [facility] OR;  Service: OB/GYN;  Laterality: N/A;  VNOTES / 4 HOURS    LYSIS OF ADHESIONS N/A 5/12/2023    Procedure: LYSIS, ADHESIONS;  Surgeon: Lulu Parham DO;  Location: [facility];  Service: OB/GYN;  Laterality: N/A;    SKIN BIOPSY      Surgical removal neck cancer Right     TUBAL LIGATION         Family History   Problem Relation Age of Onset    Hypertension Mother     Breast cancer Mother     Cancer Sister     Colon cancer Neg Hx     Ovarian cancer Neg Hx        Social History     Socioeconomic History    Marital status: Single    Number of children: 2    Years of education: 14 years   Occupational History    Occupation: [employer] manager     Employer: [id number]   Tobacco Use    Smoking status: Never    Smokeless tobacco: Never   Substance and Sexual Activity    Alcohol use: Not Currently     Comment: Social    Drug use: No    Sexual activity: Not Currently     Partners: Male     Birth control/protection: Surgical   Social History  Narrative    Works at [employer] and does not exercise regularly     Social Determinants of Health     Financial Resource Strain: High Risk    Difficulty of Paying Living Expenses: Hard   Food Insecurity: Food Insecurity Present    Worried About Running Out of Food in the Last Year: Often true    Ran Out of Food in the Last Year: Sometimes true   Transportation Needs: No Transportation Needs    Lack of Transportation (Medical): No    Lack of Transportation (Non-Medical): No   Physical Activity: Unknown    Days of Exercise per Week: Patient refused    Minutes of Exercise per Session: 20 min   Stress: No Stress Concern Present    Feeling of Stress : Only a little   Social Connections: Unknown    Frequency of Communication with Friends and Family: Twice a week    Frequency of Social Gatherings with Friends and Family: Patient refused    Active Member of Clubs or Organizations: No    Attends Club or Organization Meetings: Patient refused    Marital Status: Never    Housing Stability: Unknown    Unable to Pay for Housing in the Last Year: Patient refused    Unstable Housing in the Last Year: No       Current Outpatient Medications   Medication Sig Dispense Refill    docusate sodium (COLACE) 100 MG capsule Take 1 capsule (100 mg total) by mouth 2 (two) times daily. 60 capsule 0    fluticasone propionate (FLONASE) 50 mcg/actuation nasal spray 1 spray (50 mcg total) by Each Nostril route once daily. 16 g 11    hydroCHLOROthiazide (HYDRODIURIL) 12.5 MG Tab Take 1 tablet (12.5 mg total) by mouth once daily. 30 tablet 11    HYDROcodone-acetaminophen (NORCO)  mg per tablet Take 1 tablet by mouth every 6 (six) hours as needed for Pain. 30 tablet 0    ibuprofen (ADVIL,MOTRIN) 600 MG tablet Take 1 tablet (600 mg total) by mouth every 6 (six) hours as needed for Pain. 30 tablet 1    MULTIVITAMIN ORAL Take by mouth.      oxybutynin (DITROPAN) 5 MG Tab Take 1 tablet (5 mg total) by mouth 3 (three) times daily as needed  (bladder spasms). 30 tablet 0    sulfamethoxazole-trimethoprim 800-160mg (BACTRIM DS) 800-160 mg Tab Take 1 tablet by mouth once daily. 10 tablet 0     No current facility-administered medications for this visit.       Review of patient's allergies indicates:   Allergen Reactions    No known allergies         ROS  Per HPI    Physical Exam  LMP 06/27/2012   General: alert and oriented, no acute distress  Respiratory: normal respiratory effort  Abd: soft, non-tender, non-distended  Pelvic:  Ext. Genitalia: normal external genitalia. Normal bartholin's and skeens glands  Vagina: -- atrophy. Sutures in tact along posterior vagina and cuff, healing well, slight yellow discharge noted.   Non-tender bladder base without palpable mass.  Cervix: absent  Uterus:  absent   Urethra: no masses or tenderness  Urethral meatus: no lesions, caruncle or prolapse.    POP-Q:  None    Impression  1. s/p Vnote/USLS/A&P repair/cyst          We reviewed the above issues and discussed options for short-term versus long-term management of her problems.     Plan:   Post op healing well. Continue to follow post op instructions.  Patient will follow up with us in 4 weeks as scheduled for 6 week PO      Neymar Koehler PA-C  [facility]  Division of Female Pelvic Medicine and Reconstructive Surgery  Department of Obstetrics & Gynecology

## 2023-06-21 ENCOUNTER — OFFICE VISIT (OUTPATIENT)
Dept: UROGYNECOLOGY | Facility: CLINIC | Age: 63
End: 2023-06-21
Payer: COMMERCIAL

## 2023-06-21 VITALS
HEART RATE: 65 BPM | WEIGHT: 184.31 LBS | BODY MASS INDEX: 33.71 KG/M2 | SYSTOLIC BLOOD PRESSURE: 160 MMHG | DIASTOLIC BLOOD PRESSURE: 77 MMHG

## 2023-06-21 DIAGNOSIS — Z90.710 S/P VAGINAL HYSTERECTOMY: ICD-10-CM

## 2023-06-21 DIAGNOSIS — E66.9 OBESITY (BMI 30.0-34.9): Primary | ICD-10-CM

## 2023-06-21 PROCEDURE — 3078F PR MOST RECENT DIASTOLIC BLOOD PRESSURE < 80 MM HG: ICD-10-PCS | Mod: CPTII,S$GLB,, | Performed by: OBSTETRICS & GYNECOLOGY

## 2023-06-21 PROCEDURE — 3008F PR BODY MASS INDEX (BMI) DOCUMENTED: ICD-10-PCS | Mod: CPTII,S$GLB,, | Performed by: OBSTETRICS & GYNECOLOGY

## 2023-06-21 PROCEDURE — 3044F PR MOST RECENT HEMOGLOBIN A1C LEVEL <7.0%: ICD-10-PCS | Mod: CPTII,S$GLB,, | Performed by: OBSTETRICS & GYNECOLOGY

## 2023-06-21 PROCEDURE — 99999 PR PBB SHADOW E&M-EST. PATIENT-LVL III: CPT | Mod: PBBFAC,,, | Performed by: OBSTETRICS & GYNECOLOGY

## 2023-06-21 PROCEDURE — 3078F DIAST BP <80 MM HG: CPT | Mod: CPTII,S$GLB,, | Performed by: OBSTETRICS & GYNECOLOGY

## 2023-06-21 PROCEDURE — 99024 POSTOP FOLLOW-UP VISIT: CPT | Mod: S$GLB,,, | Performed by: OBSTETRICS & GYNECOLOGY

## 2023-06-21 PROCEDURE — 3008F BODY MASS INDEX DOCD: CPT | Mod: CPTII,S$GLB,, | Performed by: OBSTETRICS & GYNECOLOGY

## 2023-06-21 PROCEDURE — 3077F SYST BP >= 140 MM HG: CPT | Mod: CPTII,S$GLB,, | Performed by: OBSTETRICS & GYNECOLOGY

## 2023-06-21 PROCEDURE — 3044F HG A1C LEVEL LT 7.0%: CPT | Mod: CPTII,S$GLB,, | Performed by: OBSTETRICS & GYNECOLOGY

## 2023-06-21 PROCEDURE — 99999 PR PBB SHADOW E&M-EST. PATIENT-LVL III: ICD-10-PCS | Mod: PBBFAC,,, | Performed by: OBSTETRICS & GYNECOLOGY

## 2023-06-21 PROCEDURE — 3077F PR MOST RECENT SYSTOLIC BLOOD PRESSURE >= 140 MM HG: ICD-10-PCS | Mod: CPTII,S$GLB,, | Performed by: OBSTETRICS & GYNECOLOGY

## 2023-06-21 PROCEDURE — 99024 PR POST-OP FOLLOW-UP VISIT: ICD-10-PCS | Mod: S$GLB,,, | Performed by: OBSTETRICS & GYNECOLOGY

## 2023-06-21 RX ORDER — ESTRADIOL 0.1 MG/G
0.5 CREAM VAGINAL
Qty: 45 G | Refills: 4 | Status: SHIPPED | OUTPATIENT
Start: 2023-06-22 | End: 2024-06-13

## 2023-06-21 NOTE — PATIENT INSTRUCTIONS
Post op healing well. Continue to follow post op instructions.  Patient will follow up with us in 4 weeks   Vaginal estrogen twice a week   May return to work with restrictions.

## 2023-06-21 NOTE — PROGRESS NOTES
[facility] - UROGYNECOLOGY  [address] [address]      Irene Lawton  [id number]  1960    UROGYN POST-OP      Irene Lawton is a 63 y.o. here for a 2 week post operative visit.    OPERATIVE NOTE  PROCEDURE DATE: 05/12/2023     PROCEDURE:   1. Vaginal assisted laparoscopic hysterectomy (VNotes) (Modifier 22)  2. Laparoscopic Bilateral salpingoophrectomy ooprherctomy   3. Bilateral high uterosacral ligament suspension  4. Anterior Colporrhapy   5. Lysis of adhesions   6. Posterior Colporrhaphy and Perineorrhaphy      ATTENDING SURGEON: Dr. Parham     ASSISTANT:   Marcie Macias, PGY 4  Cee Osorio, PGY-3     Neymar Koehler PA-C no qualified resident available      PREOPERATIVE DIAGNOSIS:  1. Symptomatic 2 stage  anterior and posterior vaginal wall prolapse  2. Symptomatic 2 stage Uterovaginal prolapse  3. Pelvic pressure      POSTOPERATIVE DIAGNOSIS:  1. Symptomatic stage 2 anterior and posterior vaginal wall prolapse  2. Symptomatic stage 3  Uterovaginal prolapse  3. Pelvic pressure   4. Left and  right ovary adhered to the pelvic side wall.  5. Sigmoid colon adhesions   6. Redundant bowel      HISTORY SINCE LAST VISIT (2 week PO):  Patient is doing well, she has some slight discomfort in her pelvis. Ibuprofen helps. Was having constipation, but took milk of magnesia and it's better, especially since being off pain medication. Minimal spotting, has seen some sutures coming out in her underwear. Denies discharge. Activity level is light,   Bladder issues: None, denies increased urgency, frequency, denies leakage and dysuria.    Bowel issues: None anymore. Not taking stool softeners anymore and says that stool is soft and she is not straining.     6/22/2023:  Patient here for follow up doing well, pain well controlled.   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 She also has to perform excessive stretching in order to complete her work.    She is gaining weight and having a hard time to keeping it off.     Past Medical History:   Diagnosis Date    ALLERGIC RHINITIS     Anemia     Anxiety     Cancer     right neck    Hypertension     Transient elevated blood pressure        Past Surgical History:   Procedure Laterality Date    COLONOSCOPY N/A 12/12/2020    Procedure: COLONOSCOPY;  Surgeon: GHADA Gamez MD;  Location: [address]);  Service: Endoscopy;  Laterality: N/A;  Pt reports family Hx colon cancer (Pt's father )  prep ins. emailed - COVID screening on 12/9/20 [place]- ERW    COLPORRHAPHY, COMBINED ANTEROPOSTERIOR N/A 5/12/2023    Procedure: COLPORRHAPHY, COMBINED ANTEROPOSTERIOR;  Surgeon: Lulu Parham DO;  Location: [facility] OR;  Service: OB/GYN;  Laterality: N/A;    CYSTOSCOPY  5/12/2023    Procedure: CYSTOSCOPY;  Surgeon: Lulu Parham DO;  [facility];  Service: OB/GYN;;    LAPAROSCOPIC SUSPENSION OF UTEROSACRAL LIGAMENT N/A 5/12/2023    Procedure: SUSPENSION, LIGAMENT, UTEROSACRAL, LAPAROSCOPIC;  Surgeon: Lulu Parham DO;  Location: [facility] OR;  Service: OB/GYN;  Laterality: N/A;    LAPAROSCOPIC TOTAL HYSTERECTOMY N/A 5/12/2023    Procedure: HYSTERECTOMY, TOTAL, LAPAROSCOPIC;  Surgeon: Lulu Parham DO;  [facility]: [facility];  Service: OB/GYN;  Laterality: N/A;  VNOTES / 4 HOURS    LYSIS OF ADHESIONS N/A 5/12/2023    Procedure: LYSIS, ADHESIONS;  Surgeon: Lulu Parham DO;  Location: [facility];  Service: OB/GYN;  Laterality: N/A;    SKIN BIOPSY      Surgical removal neck cancer Right     TUBAL LIGATION         Family History   Problem Relation Age of Onset    Hypertension Mother     Breast cancer Mother     Cancer Sister     Colon cancer Neg Hx     Ovarian cancer Neg Hx        Social History     Socioeconomic History    Marital status: Single    Number of  children: 2    Years of education: 14 years   Occupational History    Occupation: [employer] manager     Employer: [id number]   Tobacco Use    Smoking status: Never    Smokeless tobacco: Never   Substance and Sexual Activity    Alcohol use: Not Currently     Comment: Social    Drug use: No    Sexual activity: Not Currently     Partners: Male     Birth control/protection: Surgical   Social History Narrative    Works at [employer] and does not exercise regularly     Social Determinants of Health     Financial Resource Strain: High Risk    Difficulty of Paying Living Expenses: Hard   Food Insecurity: Food Insecurity Present    Worried About Running Out of Food in the Last Year: Often true    Ran Out of Food in the Last Year: Sometimes true   Transportation Needs: No Transportation Needs    Lack of Transportation (Medical): No    Lack of Transportation (Non-Medical): No   Physical Activity: Unknown    Days of Exercise per Week: Patient refused    Minutes of Exercise per Session: 20 min   Stress: No Stress Concern Present    Feeling of Stress : Only a little   Social Connections: Unknown    Frequency of Communication with Friends and Family: Twice a week    Frequency of Social Gatherings with Friends and Family: Patient refused    Active Member of Clubs or Organizations: No    Attends Club or Organization Meetings: Patient refused    Marital Status: Never    Housing Stability: Unknown    Unable to Pay for Housing in the Last Year: Patient refused    Unstable Housing in the Last Year: No       Current Outpatient Medications   Medication Sig Dispense Refill    fluticasone propionate (FLONASE) 50 mcg/actuation nasal spray 1 spray (50 mcg total) by Each Nostril route once daily. 16 g 11    hydroCHLOROthiazide (HYDRODIURIL) 12.5 MG Tab Take 1 tablet (12.5 mg total) by mouth once daily. 30 tablet 11    ibuprofen (ADVIL,MOTRIN) 600 MG tablet Take 1 tablet (600 mg total) by mouth every 6 (six) hours as needed for  Pain. 30 tablet 1    MULTIVITAMIN ORAL Take by mouth.      oxybutynin (DITROPAN) 5 MG Tab Take 1 tablet (5 mg total) by mouth 3 (three) times daily as needed (bladder spasms). 30 tablet 0    docusate sodium (COLACE) 100 MG capsule Take 1 capsule (100 mg total) by mouth 2 (two) times daily. (Patient not taking: Reported on 6/21/2023) 60 capsule 0    estradioL (ESTRACE) 0.01 % (0.1 mg/gram) vaginal cream Place 0.5 g vaginally twice a week. Apply to the vagina daily for two weeks then twice a week. 45 g 4    HYDROcodone-acetaminophen (NORCO)  mg per tablet Take 1 tablet by mouth every 6 (six) hours as needed for Pain. (Patient not taking: Reported on 6/21/2023) 30 tablet 0    sulfamethoxazole-trimethoprim 800-160mg (BACTRIM DS) 800-160 mg Tab Take 1 tablet by mouth once daily. (Patient not taking: Reported on 6/21/2023) 10 tablet 0     No current facility-administered medications for this visit.       Review of patient's allergies indicates:   Allergen Reactions    No known allergies         ROS  Per HPI    Physical Exam  BP (!) 160/77   Pulse 65   Wt 83.6 kg (184 lb 4.9 oz)   LMP 06/27/2012   BMI 33.71 kg/m²   General: alert and oriented, no acute distress  Respiratory: normal respiratory effort  Abd: soft, non-tender, non-distended  Pelvic:  Ext. Genitalia: normal external genitalia. Normal bartholin's and skeens glands  Vagina: -- atrophy. Sutures still present, at the apex along posterior vagina and cuff, healing well, slight yellow discharge noted.     Non-tender bladder base without palpable mass.  Cervix: absent  Uterus:  absent   Urethra: no masses or tenderness  Urethral meatus: no lesions, caruncle or prolapse.    POP-Q:  None    Impression  1. Obesity (BMI 30.0-34.9)  Ambulatory referral/consult to Weight Management Program          We reviewed the above issues and discussed options for short-term versus long-term management of her problems.     Plan:   Post op healing well. Continue to follow post  op instructions.  Patient will follow up with us in 4 weeks   Vaginal estrogen twice a week   May return to work with restrictions.  Recommend weight management program       Approximately 25 minutes of total time spent in consult, 75 % in discussion. This includes face to face time and non-face to face time preparing to see the patient, obtaining and/or reviewing separately obtained history, documenting clinical information in the electronic or other health record, independently interpreting results (not separately reported) and communicating results to the patient/family/caregiver, or care coordination (not separately reported).      Luul Parham DO  Female Pelvic Medicine and Reconstructive Surgery  [facility] [place], [place]

## 2023-07-19 ENCOUNTER — OFFICE VISIT (OUTPATIENT)
Dept: UROGYNECOLOGY | Facility: CLINIC | Age: 63
End: 2023-07-19
Payer: COMMERCIAL

## 2023-07-19 VITALS
WEIGHT: 190.06 LBS | HEIGHT: 62 IN | BODY MASS INDEX: 34.98 KG/M2 | SYSTOLIC BLOOD PRESSURE: 177 MMHG | DIASTOLIC BLOOD PRESSURE: 74 MMHG | HEART RATE: 59 BPM

## 2023-07-19 DIAGNOSIS — Z90.710 S/P VAGINAL HYSTERECTOMY: Primary | ICD-10-CM

## 2023-07-19 PROCEDURE — 3077F SYST BP >= 140 MM HG: CPT | Mod: CPTII,S$GLB,, | Performed by: OBSTETRICS & GYNECOLOGY

## 2023-07-19 PROCEDURE — 1159F PR MEDICATION LIST DOCUMENTED IN MEDICAL RECORD: ICD-10-PCS | Mod: CPTII,S$GLB,, | Performed by: OBSTETRICS & GYNECOLOGY

## 2023-07-19 PROCEDURE — 1160F PR REVIEW ALL MEDS BY PRESCRIBER/CLIN PHARMACIST DOCUMENTED: ICD-10-PCS | Mod: CPTII,S$GLB,, | Performed by: OBSTETRICS & GYNECOLOGY

## 2023-07-19 PROCEDURE — 3078F DIAST BP <80 MM HG: CPT | Mod: CPTII,S$GLB,, | Performed by: OBSTETRICS & GYNECOLOGY

## 2023-07-19 PROCEDURE — 3044F PR MOST RECENT HEMOGLOBIN A1C LEVEL <7.0%: ICD-10-PCS | Mod: CPTII,S$GLB,, | Performed by: OBSTETRICS & GYNECOLOGY

## 2023-07-19 PROCEDURE — 1160F RVW MEDS BY RX/DR IN RCRD: CPT | Mod: CPTII,S$GLB,, | Performed by: OBSTETRICS & GYNECOLOGY

## 2023-07-19 PROCEDURE — 3008F PR BODY MASS INDEX (BMI) DOCUMENTED: ICD-10-PCS | Mod: CPTII,S$GLB,, | Performed by: OBSTETRICS & GYNECOLOGY

## 2023-07-19 PROCEDURE — 3077F PR MOST RECENT SYSTOLIC BLOOD PRESSURE >= 140 MM HG: ICD-10-PCS | Mod: CPTII,S$GLB,, | Performed by: OBSTETRICS & GYNECOLOGY

## 2023-07-19 PROCEDURE — 3008F BODY MASS INDEX DOCD: CPT | Mod: CPTII,S$GLB,, | Performed by: OBSTETRICS & GYNECOLOGY

## 2023-07-19 PROCEDURE — 99999 PR PBB SHADOW E&M-EST. PATIENT-LVL III: CPT | Mod: PBBFAC,,, | Performed by: OBSTETRICS & GYNECOLOGY

## 2023-07-19 PROCEDURE — 99024 PR POST-OP FOLLOW-UP VISIT: ICD-10-PCS | Mod: S$GLB,,, | Performed by: OBSTETRICS & GYNECOLOGY

## 2023-07-19 PROCEDURE — 99024 POSTOP FOLLOW-UP VISIT: CPT | Mod: S$GLB,,, | Performed by: OBSTETRICS & GYNECOLOGY

## 2023-07-19 PROCEDURE — 3078F PR MOST RECENT DIASTOLIC BLOOD PRESSURE < 80 MM HG: ICD-10-PCS | Mod: CPTII,S$GLB,, | Performed by: OBSTETRICS & GYNECOLOGY

## 2023-07-19 PROCEDURE — 3044F HG A1C LEVEL LT 7.0%: CPT | Mod: CPTII,S$GLB,, | Performed by: OBSTETRICS & GYNECOLOGY

## 2023-07-19 PROCEDURE — 1159F MED LIST DOCD IN RCRD: CPT | Mod: CPTII,S$GLB,, | Performed by: OBSTETRICS & GYNECOLOGY

## 2023-07-19 PROCEDURE — 99999 PR PBB SHADOW E&M-EST. PATIENT-LVL III: ICD-10-PCS | Mod: PBBFAC,,, | Performed by: OBSTETRICS & GYNECOLOGY

## 2023-07-19 NOTE — PROGRESS NOTES
[facility] - UROGYNECOLOGY  [address] [address]      Irene Lawton  [id number]  1960    UROGYN POST-OP      Irene Lawton is a 63 y.o. here for a post operative visit.    OPERATIVE NOTE  PROCEDURE DATE: 05/12/2023     PROCEDURE:   1. Vaginal assisted laparoscopic hysterectomy (VNotes) (Modifier 22)  2. Laparoscopic Bilateral salpingoophrectomy ooprherctomy   3. Bilateral high uterosacral ligament suspension  4. Anterior Colporrhapy   5. Lysis of adhesions   6. Posterior Colporrhaphy and Perineorrhaphy      ATTENDING SURGEON: Dr. Parham     ASSISTANT:   Marcie Macias, PGY 4  Cee Osorio, PGY-3     Neymar Koehler PA-C no qualified resident available      PREOPERATIVE DIAGNOSIS:  1. Symptomatic 2 stage  anterior and posterior vaginal wall prolapse  2. Symptomatic 2 stage Uterovaginal prolapse  3. Pelvic pressure      POSTOPERATIVE DIAGNOSIS:  1. Symptomatic stage 2 anterior and posterior vaginal wall prolapse  2. Symptomatic stage 3  Uterovaginal prolapse  3. Pelvic pressure   4. Left and  right ovary adhered to the pelvic side wall.  5. Sigmoid colon adhesions   6. Redundant bowel      HISTORY SINCE LAST VISIT (2 week PO):  Patient is doing well, she has some slight discomfort in her pelvis. Ibuprofen helps. Was having constipation, but took milk of magnesia and it's better, especially since being off pain medication. Minimal spotting, has seen some sutures coming out in her underwear. Denies discharge. Activity level is light,   Bladder issues: None, denies increased urgency, frequency, denies leakage and dysuria.    Bowel issues: None anymore. Not taking stool softeners anymore and says that stool is soft and she is not straining.     6/22/2023:  Patient here for follow up doing well, pain well controlled.   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 She also has to perform excessive stretching in order to complete her work.    She is gaining weight and having a hard time to keeping it off.     7/19/2023  Doing well no issues       Past Medical History:   Diagnosis Date    ALLERGIC RHINITIS     Anemia     Anxiety     Cancer     right neck    Hypertension     Transient elevated blood pressure        Past Surgical History:   Procedure Laterality Date    COLONOSCOPY N/A 12/12/2020    Procedure: COLONOSCOPY;  Surgeon: GHADA Gamez MD;  Location: [facility] ([address] [place] [address]);  Service: Endoscopy;  Laterality: N/A;  Pt reports family Hx colon cancer (Pt's father )  prep ins. emailed - COVID screening on 12/9/20 [place]- ERW    COLPORRHAPHY, COMBINED ANTEROPOSTERIOR N/A 5/12/2023    Procedure: COLPORRHAPHY, COMBINED ANTEROPOSTERIOR;  Surgeon: Lulu Parham DO;  Location: [facility] OR;  Service: OB/GYN;  Laterality: N/A;    CYSTOSCOPY  5/12/2023    Procedure: CYSTOSCOPY;  Surgeon: Lulu Parham DO;  Location: [facility] OR;  Service: OB/GYN;;    LAPAROSCOPIC SUSPENSION OF UTEROSACRAL LIGAMENT N/A 5/12/2023    Procedure: SUSPENSION, LIGAMENT, UTEROSACRAL, LAPAROSCOPIC;  Surgeon: Lulu Parham DO;  Location: [facility] OR;  Service: OB/GYN;  Laterality: N/A;    LAPAROSCOPIC TOTAL HYSTERECTOMY N/A 5/12/2023    Procedure: HYSTERECTOMY, TOTAL, LAPAROSCOPIC;  Surgeon: Lulu Parham DO;  Location: [facility] OR;  Service: OB/GYN;  Laterality: N/A;  VNOTES / 4 HOURS    LYSIS OF ADHESIONS N/A 5/12/2023    Procedure: LYSIS, ADHESIONS;  Surgeon: Lulu Parham DO;  Location: [facility] OR;  Service: OB/GYN;  Laterality: N/A;    SKIN BIOPSY      Surgical removal neck cancer Right     TUBAL LIGATION         Family History   Problem Relation Age of Onset    Hypertension Mother     Breast cancer Mother     Cancer Sister     Colon cancer Neg Hx     Ovarian cancer Neg Hx        Social History     Socioeconomic History     Marital status: Single    Number of children: 2    Years of education: 14 years   Occupational History    Occupation: [employer] manager     Employer: [id number]   Tobacco Use    Smoking status: Never    Smokeless tobacco: Never   Substance and Sexual Activity    Alcohol use: Not Currently     Comment: Social    Drug use: No    Sexual activity: Not Currently     Partners: Male     Birth control/protection: Surgical   Social History Narrative    Works at [employer] and does not exercise regularly     Social Determinants of Health     Financial Resource Strain: High Risk    Difficulty of Paying Living Expenses: Hard   Food Insecurity: Food Insecurity Present    Worried About Running Out of Food in the Last Year: Often true    Ran Out of Food in the Last Year: Sometimes true   Transportation Needs: No Transportation Needs    Lack of Transportation (Medical): No    Lack of Transportation (Non-Medical): No   Physical Activity: Unknown    Days of Exercise per Week: Patient refused    Minutes of Exercise per Session: 20 min   Stress: No Stress Concern Present    Feeling of Stress : Only a little   Social Connections: Unknown    Frequency of Communication with Friends and Family: Twice a week    Frequency of Social Gatherings with Friends and Family: Patient refused    Active Member of Clubs or Organizations: No    Attends Club or Organization Meetings: Patient refused    Marital Status: Never    Housing Stability: Unknown    Unable to Pay for Housing in the Last Year: Patient refused    Unstable Housing in the Last Year: No       Current Outpatient Medications   Medication Sig Dispense Refill    estradioL (ESTRACE) 0.01 % (0.1 mg/gram) vaginal cream Place 0.5 g vaginally twice a week. Apply to the vagina daily for two weeks then twice a week. 45 g 4    fluticasone propionate (FLONASE) 50 mcg/actuation nasal spray 1 spray (50 mcg total) by Each Nostril route once daily. 16 g 11    hydroCHLOROthiazide  "(HYDRODIURIL) 12.5 MG Tab Take 1 tablet (12.5 mg total) by mouth once daily. 30 tablet 11    MULTIVITAMIN ORAL Take by mouth.      docusate sodium (COLACE) 100 MG capsule Take 1 capsule (100 mg total) by mouth 2 (two) times daily. 60 capsule 0    HYDROcodone-acetaminophen (NORCO)  mg per tablet Take 1 tablet by mouth every 6 (six) hours as needed for Pain. 30 tablet 0    ibuprofen (ADVIL,MOTRIN) 600 MG tablet Take 1 tablet (600 mg total) by mouth every 6 (six) hours as needed for Pain. (Patient not taking: Reported on 7/19/2023) 30 tablet 1    oxybutynin (DITROPAN) 5 MG Tab Take 1 tablet (5 mg total) by mouth 3 (three) times daily as needed (bladder spasms). 30 tablet 0    sulfamethoxazole-trimethoprim 800-160mg (BACTRIM DS) 800-160 mg Tab Take 1 tablet by mouth once daily. 10 tablet 0     No current facility-administered medications for this visit.       Review of patient's allergies indicates:   Allergen Reactions    No known allergies         ROS  Per HPI    Physical Exam  BP (!) 177/74   Pulse (!) 59   Ht 5' 2" (1.575 m)   Wt 86.2 kg (190 lb 0.6 oz)   LMP 06/27/2012   BMI 34.76 kg/m²   General: alert and oriented, no acute distress  Respiratory: normal respiratory effort  Abd: soft, non-tender, non-distended  Pelvic:  No performed       Impression  1. s/p Vnote/USLS/A&P repair/cyst              We reviewed the above issues and discussed options for short-term versus long-term management of her problems.     Plan:   Post op healing well. Continue to follow post op instructions.  Patient will follow up with us in 4 weeks   Vaginal estrogen twice a week   May return to work with restrictions.  Recommend weight management program       Approximately 25 minutes of total time spent in consult, 75 % in discussion. This includes face to face time and non-face to face time preparing to see the patient, obtaining and/or reviewing separately obtained history, documenting clinical information in the electronic or " other health record, independently interpreting results (not separately reported) and communicating results to the patient/family/caregiver, or care coordination (not separately reported).      Lulu Parham DO  Female Pelvic Medicine and Reconstructive Surgery  [facility] [place], [place]

## 2023-08-16 ENCOUNTER — TELEPHONE (OUTPATIENT)
Dept: SURGERY | Facility: CLINIC | Age: 63
End: 2023-08-16
Payer: COMMERCIAL

## 2023-08-18 ENCOUNTER — TELEPHONE (OUTPATIENT)
Dept: BARIATRICS | Facility: CLINIC | Age: 63
End: 2023-08-18
Payer: COMMERCIAL

## 2023-10-01 ENCOUNTER — HOSPITAL ENCOUNTER (EMERGENCY)
Facility: OTHER | Age: 63
Discharge: HOME | End: 2023-10-01
Attending: EMERGENCY MEDICINE
Payer: COMMERCIAL

## 2023-10-01 VITALS
DIASTOLIC BLOOD PRESSURE: 88 MMHG | HEART RATE: 64 BPM | RESPIRATION RATE: 16 BRPM | BODY MASS INDEX: 34.04 KG/M2 | OXYGEN SATURATION: 99 % | HEIGHT: 62 IN | TEMPERATURE: 98 F | WEIGHT: 185 LBS | SYSTOLIC BLOOD PRESSURE: 199 MMHG

## 2023-10-01 DIAGNOSIS — S99.912A LEFT ANKLE INJURY: ICD-10-CM

## 2023-10-01 DIAGNOSIS — S93.402A SPRAIN OF LEFT ANKLE, UNSPECIFIED LIGAMENT, INITIAL ENCOUNTER: Primary | ICD-10-CM

## 2023-10-01 PROCEDURE — 99283 EMERGENCY DEPT VISIT LOW MDM: CPT

## 2023-10-01 PROCEDURE — 25000003 PHARM REV CODE 250

## 2023-10-01 RX ORDER — IBUPROFEN 600 MG/1
600 TABLET ORAL
Status: COMPLETED | OUTPATIENT
Start: 2023-10-01 | End: 2023-10-01

## 2023-10-01 RX ORDER — IBUPROFEN 600 MG/1
600 TABLET ORAL EVERY 6 HOURS PRN
Qty: 20 TABLET | Refills: 0 | Status: SHIPPED | OUTPATIENT
Start: 2023-10-01 | End: 2023-12-04 | Stop reason: SDUPTHER

## 2023-10-01 RX ADMIN — IBUPROFEN 600 MG: 600 TABLET, FILM COATED ORAL at 10:10

## 2023-10-01 NOTE — ED PROVIDER NOTES
Source of History:  Patient    Chief complaint:  Ankle Pain (Pt stats that she had a trip and fall this morning and now has L ankle pain, mild swelling noted in triage + PSM to extremity, pt able to bear weight but extremely painful. )      HPI:  Irene Lawton is a 63 y.o. female presenting with  left ankle pain that began after trip and fall at 2:00 a.m. this morning.  Patient reports that she was walking outside this morning when she slipped on some gravel and twisted her ankle.  States that she fell, did not hit her head, did not lose consciousness.  States that after the fall she had pain to her left ankle but was able to get up and ambulate to bed.  States that upon waking up this morning, she noticed her ankle was swollen and she continued to have pain with ambulation.  She denies any numbness or tingling.  Has not taken anything for pain.  Reports that she has a history of hypertension, did not take blood pressure medicine this morning, believes that her blood pressure is elevated secondary to pain.    This is the extent to the patients complaints today here in the emergency department.    ROS: As per HPI     Review of patient's allergies indicates:   Allergen Reactions    No known allergies        PMH:  As per HPI and below:  Past Medical History:   Diagnosis Date    ALLERGIC RHINITIS     Anemia     Anxiety     Cancer     right neck    Hypertension     Transient elevated blood pressure      Past Surgical History:   Procedure Laterality Date    COLONOSCOPY N/A 12/12/2020    Procedure: COLONOSCOPY;  Surgeon: GHADA Gamez MD;  Location: [address]);  Service: Endoscopy;  Laterality: N/A;  Pt reports family Hx colon cancer (Pt's father )  prep ins. emailed - COVID screening on 12/9/20 [place]- [facility]    COLPORRHAPHY, COMBINED ANTEROPOSTERIOR N/A 5/12/2023    Procedure: COLPORRHAPHY, COMBINED ANTEROPOSTERIOR;  Surgeon: Lulu Parham DO;  Location: [facility] OR;  Service: OB/GYN;  Laterality:  "N/A;    CYSTOSCOPY  5/12/2023    Procedure: CYSTOSCOPY;  Surgeon: Lulu Parham DO;  Location: [facility] OR;  Service: OB/GYN;;    LAPAROSCOPIC SUSPENSION OF UTEROSACRAL LIGAMENT N/A 5/12/2023    Procedure: SUSPENSION, LIGAMENT, UTEROSACRAL, LAPAROSCOPIC;  Surgeon: Lulu Parham DO;  Location: [facility] OR;  Service: OB/GYN;  Laterality: N/A;    LAPAROSCOPIC TOTAL HYSTERECTOMY N/A 5/12/2023    Procedure: HYSTERECTOMY, TOTAL, LAPAROSCOPIC;  Surgeon: Lulu Parham DO;  Location: [facility] OR;  Service: OB/GYN;  Laterality: N/A;  VNOTES / 4 HOURS    LYSIS OF ADHESIONS N/A 5/12/2023    Procedure: LYSIS, ADHESIONS;  Surgeon: Lulu Parham DO;  Location: [facility] OR;  Service: OB/GYN;  Laterality: N/A;    SKIN BIOPSY      Surgical removal neck cancer Right     TUBAL LIGATION         Social History     Tobacco Use    Smoking status: Never    Smokeless tobacco: Never   Substance Use Topics    Alcohol use: Not Currently     Comment: Social    Drug use: No       Physical Exam:    BP (!) 199/88 (BP Location: Right arm, Patient Position: Sitting)   Pulse 64   Temp 98.2 °F (36.8 °C) (Oral)   Resp 16   Ht 5' 2" (1.575 m)   Wt 83.9 kg (185 lb)   LMP 06/27/2012   SpO2 99%   BMI 33.84 kg/m²   Nursing note and vital signs reviewed.  Constitutional: Well appearing, speaking in full sentences. No acute distress.  Eyes: No conjunctival injection.  Extraocular muscles are intact.  ENT: Normal phonation.  Chest: No respiratory distress.  Lungs clear to auscultation bilaterally with no wheezing, rales, rhonchi.  Cardiovascular:  Heart regular rate and rhythm with no murmurs, rubs, gallops.  Musculoskeletal:  Left ankle with tenderness, bruising, and swelling present at anterior lateral ankle.  No tenderness to the medial malleolus, lateral malleolus.  No tenderness at the base of the 5th metatarsal.  No significant laxity on exam.  2+ DP and PT pulse present.  Distal left foot neurovascularly intact.  No tenderness " Achilles tendon, Achilles tendon intact per Del Rio testing.  Skin:  Abrasion present to right knee with isolated tenderness.  No active bleeding.  No rashes seen.  Good turgor.  No ecchymoses.  Psych: Appropriate, conversant.        I decided to obtain the patient's medical records.    Results for orders placed or performed in visit on 05/16/23   Urine culture    Specimen: Urine, Catheterized   Result Value Ref Range    Urine Culture, Routine No growth      Imaging Results              X-Ray Foot Complete Left (Final result)  Result time 10/01/23 10:38:18      Final result by Abner Sanchez MD (10/01/23 10:38:18)                   Impression:      Soft tissue swelling without evidence of displaced fracture.      Electronically signed by: Abner Sanchez MD  Date:    10/01/2023  Time:    10:38               Narrative:    EXAMINATION:  XR ANKLE COMPLETE 3 VIEW LEFT; XR FOOT COMPLETE 3 VIEW LEFT    CLINICAL HISTORY:  Unspecified injury of left ankle, initial encounter    TECHNIQUE:  Three views of the left foot and ankle were performed.    COMPARISON:  None    FINDINGS:  Osseous structures appear intact without evidence of osseous destructive process, fracture or dislocation.    No significant degenerative change.    Soft tissue swelling about the ankle                                       X-Ray Ankle Complete Left (Final result)  Result time 10/01/23 10:38:18      Final result by Abner Sanchez MD (10/01/23 10:38:18)                   Impression:      Soft tissue swelling without evidence of displaced fracture.      Electronically signed by: Abner Sanchez MD  Date:    10/01/2023  Time:    10:38               Narrative:    EXAMINATION:  XR ANKLE COMPLETE 3 VIEW LEFT; XR FOOT COMPLETE 3 VIEW LEFT    CLINICAL HISTORY:  Unspecified injury of left ankle, initial encounter    TECHNIQUE:  Three views of the left foot and ankle were performed.    COMPARISON:  None    FINDINGS:  Osseous structures appear intact  without evidence of osseous destructive process, fracture or dislocation.    No significant degenerative change.    Soft tissue swelling about the ankle                                      MDM:    Emergent evaluation of 63 y.o. female who presents with a complaint of left ankle pain and swelling after trip and fall at 2am. Patient is afebrile, not toxic appearing and hemodynamically stable.  Noted to be hypertensive on arrival, did not take blood pressure medication this morning and states that her pain is severe. Exam notable for swelling and tenderness to the anterolateral ankle of the left foot.  There is an abrasion to the right anterior knee with isolated tenderness, no tenderness to palpation of remainder of knee, patient with full active range of motion of the right knee with no pain and able to ambulate on the knee with no pain.  No indication for imaging of right knee.  Initial differentials include sprain, strain, fracture, dislocation.   Per [place] ankle rules x-ray obtained. No evidence of acute osseous abnormality seen on x-ray. Considered septic joint however presentation not consistent with diagnosis. Low suspicion of tendon or ligament injury as while mildly limited by pain, patient is able to range joint, no joint laxity. This is likely a musculoskeletal sprain or strain. Ice applied and patient treated with NSAIDs in the ED. Counseled patient that if pain persists past a few weeks she may need to follow up with ortho for further outpatient imaging. Left ankle wrapped in Ace bandage, patient counseled on RICE at home and discharged with NSAIDs. Patient given crutches for comfort. No further evaluation indicated in the ED and patient discharged in good condition.  Patient blood pressure improved to 199/88 after ibuprofen.  Had a discussion with patient about elevated blood pressure, patient is having no signs or symptoms of end-organ damage at this time.  Suspect blood pressure elevated secondary to  pain and stress.  Advised patient take her blood pressure medication and check her blood pressure at home, she verbalized understanding.  Advised that her blood pressure continues to remain elevated, she will need to follow up with her primary care provider for medication adjustment.  Patient verbalized understanding.  Patient educated on on signs and symptoms to monitor for and when to return to ED. Patient verbalized understanding agrees with treatment plan. All questions and concerns addressed.  I discussed this case with my attending, Dr. Moss, who was in agreement with plan.                  Diagnostic Impression:    1. Sprain of left ankle, unspecified ligament, initial encounter    2. Left ankle injury         ED Disposition Condition    Discharge Stable            ED Prescriptions       Medication Sig Dispense Start Date End Date Auth. Provider    ibuprofen (ADVIL,MOTRIN) 600 MG tablet Take 1 tablet (600 mg total) by mouth every 6 (six) hours as needed for Pain. 20 tablet 10/1/2023 -- Cee Bee PA-C          Follow-up Information       Follow up With Specialties Details Why Contact Info    Aury Sequeira MD Internal Medicine Schedule an appointment as soon as possible for a visit in 2 days  [address] [facility] [address]  [phone]      Benjamín Quezada MD Orthopedic Surgery Schedule an appointment as soon as possible for a visit in 1 week As needed for ortho follow up [address] [address]  [phone]      [facility] ORTHOPEDICS Orthopedics Schedule an appointment as soon as possible for a visit in 1 week As needed [address] [facility] [id number] [phone]            Please pardon typos or dictation errors, as this note was transcribed using M*Modal fluency direct dictation software.      Cee Bee PA-C  10/01/23 1127

## 2023-10-01 NOTE — ED NOTES
Pt to ED with L ankle pain after mechanical fall this AM. Pain with weight bearing to LLE. NAD noted. Hypertensive, asymptomatic, otherwise VSS. No obvious deformity. Swelling present. 2+ R DP pulse.

## 2023-10-01 NOTE — DISCHARGE INSTRUCTIONS
You were seen in the ER and diagnosed with an ankle sprain.  Recommend treatment with rest, ice, compression, elevation.  You are being prescribed ibuprofen as needed for pain.  If your pain does not improve, please follow up with Orthopedics.  I have placed a referral for you.  Please follow up with your primary care provider for follow up on your blood pressure.  You may return to the ER for any new or worsening symptoms.

## 2023-10-02 ENCOUNTER — OFFICE VISIT (OUTPATIENT)
Dept: SPORTS MEDICINE | Facility: CLINIC | Age: 63
End: 2023-10-02
Payer: COMMERCIAL

## 2023-10-02 VITALS
WEIGHT: 185 LBS | BODY MASS INDEX: 34.04 KG/M2 | HEIGHT: 62 IN | DIASTOLIC BLOOD PRESSURE: 88 MMHG | SYSTOLIC BLOOD PRESSURE: 180 MMHG

## 2023-10-02 DIAGNOSIS — S93.492A SPRAIN OF ANTERIOR TALOFIBULAR LIGAMENT OF LEFT ANKLE, INITIAL ENCOUNTER: Primary | ICD-10-CM

## 2023-10-02 DIAGNOSIS — S93.402A SPRAIN OF LEFT ANKLE, UNSPECIFIED LIGAMENT, INITIAL ENCOUNTER: ICD-10-CM

## 2023-10-02 PROCEDURE — 1159F MED LIST DOCD IN RCRD: CPT | Mod: CPTII,S$GLB,, | Performed by: PHYSICIAN ASSISTANT

## 2023-10-02 PROCEDURE — 3077F SYST BP >= 140 MM HG: CPT | Mod: CPTII,S$GLB,, | Performed by: PHYSICIAN ASSISTANT

## 2023-10-02 PROCEDURE — 3044F PR MOST RECENT HEMOGLOBIN A1C LEVEL <7.0%: ICD-10-PCS | Mod: CPTII,S$GLB,, | Performed by: PHYSICIAN ASSISTANT

## 2023-10-02 PROCEDURE — 99203 OFFICE O/P NEW LOW 30 MIN: CPT | Mod: S$GLB,,, | Performed by: PHYSICIAN ASSISTANT

## 2023-10-02 PROCEDURE — 99999 PR PBB SHADOW E&M-EST. PATIENT-LVL IV: CPT | Mod: PBBFAC,,, | Performed by: PHYSICIAN ASSISTANT

## 2023-10-02 PROCEDURE — 99203 PR OFFICE/OUTPT VISIT, NEW, LEVL III, 30-44 MIN: ICD-10-PCS | Mod: S$GLB,,, | Performed by: PHYSICIAN ASSISTANT

## 2023-10-02 PROCEDURE — 3079F PR MOST RECENT DIASTOLIC BLOOD PRESSURE 80-89 MM HG: ICD-10-PCS | Mod: CPTII,S$GLB,, | Performed by: PHYSICIAN ASSISTANT

## 2023-10-02 PROCEDURE — 3044F HG A1C LEVEL LT 7.0%: CPT | Mod: CPTII,S$GLB,, | Performed by: PHYSICIAN ASSISTANT

## 2023-10-02 PROCEDURE — 1159F PR MEDICATION LIST DOCUMENTED IN MEDICAL RECORD: ICD-10-PCS | Mod: CPTII,S$GLB,, | Performed by: PHYSICIAN ASSISTANT

## 2023-10-02 PROCEDURE — 3079F DIAST BP 80-89 MM HG: CPT | Mod: CPTII,S$GLB,, | Performed by: PHYSICIAN ASSISTANT

## 2023-10-02 PROCEDURE — 3077F PR MOST RECENT SYSTOLIC BLOOD PRESSURE >= 140 MM HG: ICD-10-PCS | Mod: CPTII,S$GLB,, | Performed by: PHYSICIAN ASSISTANT

## 2023-10-02 PROCEDURE — 3008F PR BODY MASS INDEX (BMI) DOCUMENTED: ICD-10-PCS | Mod: CPTII,S$GLB,, | Performed by: PHYSICIAN ASSISTANT

## 2023-10-02 PROCEDURE — 3008F BODY MASS INDEX DOCD: CPT | Mod: CPTII,S$GLB,, | Performed by: PHYSICIAN ASSISTANT

## 2023-10-02 PROCEDURE — 99999 PR PBB SHADOW E&M-EST. PATIENT-LVL IV: ICD-10-PCS | Mod: PBBFAC,,, | Performed by: PHYSICIAN ASSISTANT

## 2023-10-02 NOTE — PROGRESS NOTES
NEW PATIENT    HISTORY OF PRESENT ILLNESS   63 y.o. Female with a history of left ankle pain who works the  at the [employer] [facility].  She injured her ankle yesterday morning when walking on uneven gravel and rolling her ankle.  She had immediate discomfort and difficulty bearing weight so she was seen in the emergency room.  X-rays were negative and she was sent home.  She has noticed some improvement in her pain and ability to bear weight.  She has had increased swelling without ecchymosis.        - mechanical symptoms, + instability    Is affecting ADLs.  Pain is 8/10 at it's worst.        PAST MEDICAL HISTORY    Past Medical History:   Diagnosis Date    ALLERGIC RHINITIS     Anemia     Anxiety     Cancer     right neck    Hypertension     Transient elevated blood pressure        PAST SURGICAL HISTORY     Past Surgical History:   Procedure Laterality Date    COLONOSCOPY N/A 12/12/2020    Procedure: COLONOSCOPY;  Surgeon: GHADA Gamez MD;  Location: [facility] ([address] [place] [address]);  Service: Endoscopy;  Laterality: N/A;  Pt reports family Hx colon cancer (Pt's father )  prep ins. emailed - COVID screening on 12/9/20 [place]- ERW    COLPORRHAPHY, COMBINED ANTEROPOSTERIOR N/A 5/12/2023    Procedure: COLPORRHAPHY, COMBINED ANTEROPOSTERIOR;  Surgeon: Lulu Parham DO;  Location: [facility];  Service: OB/GYN;  Laterality: N/A;    CYSTOSCOPY  5/12/2023    Procedure: CYSTOSCOPY;  Surgeon: Lulu Parham DO;  Location: [facility];  Service: OB/GYN;;    LAPAROSCOPIC SUSPENSION OF UTEROSACRAL LIGAMENT N/A 5/12/2023    Procedure: SUSPENSION, LIGAMENT, UTEROSACRAL, LAPAROSCOPIC;  Surgeon: Lulu Parham DO;  Location: [facility];  Service: OB/GYN;  Laterality: N/A;    LAPAROSCOPIC TOTAL HYSTERECTOMY N/A 5/12/2023    Procedure: HYSTERECTOMY, TOTAL, LAPAROSCOPIC;  Surgeon: Lulu Parham DO;  Location: [facility];  Service: OB/GYN;  Laterality: N/A;  VNOTES / 4 HOURS    LYSIS OF ADHESIONS N/A  5/12/2023    Procedure: LYSIS, ADHESIONS;  Surgeon: DANETTE Dugan  Location: [facility];  Service: OB/GYN;  Laterality: N/A;    SKIN BIOPSY      Surgical removal neck cancer Right     TUBAL LIGATION         FAMILY HISTORY    Family History   Problem Relation Age of Onset    Hypertension Mother     Breast cancer Mother     Cancer Sister     Colon cancer Neg Hx     Ovarian cancer Neg Hx        SOCIAL HISTORY    Social History     Socioeconomic History    Marital status: Single    Number of children: 2    Years of education: 14 years   Occupational History    Occupation: [employer] manager     Employer: [id number]   Tobacco Use    Smoking status: Never    Smokeless tobacco: Never   Substance and Sexual Activity    Alcohol use: Not Currently     Comment: Social    Drug use: No    Sexual activity: Not Currently     Partners: Male     Birth control/protection: Surgical   Social History Narrative    Works at [employer] and does not exercise regularly     Social Determinants of Health     Financial Resource Strain: High Risk (4/27/2023)    Overall Financial Resource Strain (CARDIA)     Difficulty of Paying Living Expenses: Hard   Food Insecurity: Food Insecurity Present (4/27/2023)    Hunger Vital Sign     Worried About Running Out of Food in the Last Year: Often true     Ran Out of Food in the Last Year: Sometimes true   Transportation Needs: No Transportation Needs (4/27/2023)    PRAPARE - Transportation     Lack of Transportation (Medical): No     Lack of Transportation (Non-Medical): No   Physical Activity: Unknown (4/27/2023)    Exercise Vital Sign     Days of Exercise per Week: Patient refused     Minutes of Exercise per Session: 20 min   Stress: No Stress Concern Present (4/27/2023)    [place] [employer] - Occupational Stress Questionnaire     Feeling of Stress : Only a little   Social Connections: Unknown (4/27/2023)    Social Connection and Isolation Panel [NHANES]     Frequency  of Communication with Friends and Family: Twice a week     Frequency of Social Gatherings with Friends and Family: Patient refused     Active Member of Clubs or Organizations: No     Attends Club or Organization Meetings: Patient refused     Marital Status: Never    Housing Stability: Unknown (4/27/2023)    Housing Stability Vital Sign     Unable to Pay for Housing in the Last Year: Patient refused     Unstable Housing in the Last Year: No       MEDICATIONS      Current Outpatient Medications:     docusate sodium (COLACE) 100 MG capsule, Take 1 capsule (100 mg total) by mouth 2 (two) times daily., Disp: 60 capsule, Rfl: 0    estradioL (ESTRACE) 0.01 % (0.1 mg/gram) vaginal cream, Place 0.5 g vaginally twice a week. Apply to the vagina daily for two weeks then twice a week., Disp: 45 g, Rfl: 4    fluticasone propionate (FLONASE) 50 mcg/actuation nasal spray, 1 spray (50 mcg total) by Each Nostril route once daily., Disp: 16 g, Rfl: 11    hydroCHLOROthiazide (HYDRODIURIL) 12.5 MG Tab, Take 1 tablet (12.5 mg total) by mouth once daily., Disp: 30 tablet, Rfl: 11    HYDROcodone-acetaminophen (NORCO)  mg per tablet, Take 1 tablet by mouth every 6 (six) hours as needed for Pain., Disp: 30 tablet, Rfl: 0    ibuprofen (ADVIL,MOTRIN) 600 MG tablet, Take 1 tablet (600 mg total) by mouth every 6 (six) hours as needed for Pain., Disp: 20 tablet, Rfl: 0    MULTIVITAMIN ORAL, Take by mouth., Disp: , Rfl:     oxybutynin (DITROPAN) 5 MG Tab, Take 1 tablet (5 mg total) by mouth 3 (three) times daily as needed (bladder spasms)., Disp: 30 tablet, Rfl: 0    sulfamethoxazole-trimethoprim 800-160mg (BACTRIM DS) 800-160 mg Tab, Take 1 tablet by mouth once daily., Disp: 10 tablet, Rfl: 0    ibuprofen (ADVIL,MOTRIN) 600 MG tablet, Take 1 tablet (600 mg total) by mouth every 6 (six) hours as needed for Pain. (Patient not taking: Reported on 7/19/2023), Disp: 30 tablet, Rfl: 1  No current facility-administered medications for this  "visit.    ALLERGIES     Review of patient's allergies indicates:   Allergen Reactions    No known allergies          REVIEW OF SYSTEMS   Constitution: Negative. Negative for chills, fever and night sweats.   HENT: Negative for congestion and headaches.    Eyes: Negative for blurred vision, left vision loss and right vision loss.   Cardiovascular: Negative for chest pain and syncope.   Respiratory: Negative for cough and shortness of breath.    Endocrine: Negative for polydipsia, polyphagia and polyuria.   Hematologic/Lymphatic: Negative for bleeding problem. Does not bruise/bleed easily.   Skin: Negative for dry skin, itching and rash.   Musculoskeletal: Negative for falls. Positive for left ankle pain and swelling.  Gastrointestinal: Negative for abdominal pain and bowel incontinence.   Genitourinary: Negative for bladder incontinence and nocturia.   Neurological: Negative for disturbances in coordination, loss of balance and seizures.   Psychiatric/Behavioral: Negative for depression. The patient does not have insomnia.    Allergic/Immunologic: Negative for hives and persistent infections.     PHYSICAL EXAMINATION    Vitals: BP (!) 180/88 (BP Location: Right arm, Patient Position: Sitting, BP Method: Medium (Manual))   Ht 5' 2" (1.575 m)   Wt 83.9 kg (185 lb)   LMP 06/27/2012   BMI 33.84 kg/m²     General: The patient appears active and healthy with no apparent physical problems.  No disturbance of mood or affect is demonstrated. Alert and Oriented.    HEENT: Eyes normal, pupils equally round, nose normal.    Resp: Equal and symmetrical chest rises. No wheezing    CV: Regular rate    Neck: Supple; nonpainful range of motion.    Extremities: no cyanosis, clubbing, edema, or diffuse swelling.  Palpable pulses, good capillary refill of the digits.  No coolness, discoloration, edema or obvious varicosities.    Skin: no lesions noted.    Lymphatic: no detected adenopathy in the upper or lower " extremities.    Neurologic: normal mental status, normal reflexes, normal gait and balance.  Patient is alert and oriented to person, place and time.  No flaccidity or spasticity is noted.  No motor or sensory deficits are noted.  Light touch is intact    Orthopaedic: Foot/Ankle Musculoskeletal Exam  Gait    Assistive device: wheelchair    Inspection    Left      Erythema: none        Effusion: none        Edema: moderate        Ecchymosis: none        Deformity: none        Alignment: normal        Previous foot incision: no previous incision      Previous ankle incision: no previous incision    Palpation    Left       Increased warmth: none        Masses: none        Crepitus: none        Tenderness: present          ATFL: severe          CFL: moderate          PTFL: moderate          Distal fibula: mild      Range of Motion    Left      Passive Dorsiflexion: 10      Passive Plantar Flexion: 20      Active Inversion: 15      Active Eversion: 10    Strength    Left      Tibialis anterior: 5/5. Tibialis anterior is not affected by pain.       Gastroc/soleus: 5/5. Gastroc/soleus is not affected by pain.       Tibialis posterior: 5/5. Tibialis posterior is not affected by pain.       Peroneals: 5/5. Peroneals are not affected by pain.     Neurovascular    Left      Capillary refill: brisk and well-perfused    Special Tests    Left      Anterior drawer test: positive      Talar tilt stress test: negative        Del Rio's test: negative          IMAGING    X-Ray Foot Complete Left  Narrative: EXAMINATION:  XR ANKLE COMPLETE 3 VIEW LEFT; XR FOOT COMPLETE 3 VIEW LEFT    CLINICAL HISTORY:  Unspecified injury of left ankle, initial encounter    TECHNIQUE:  Three views of the left foot and ankle were performed.    COMPARISON:  None    FINDINGS:  Osseous structures appear intact without evidence of osseous destructive process, fracture or dislocation.    No significant degenerative change.    Soft tissue swelling about the  ankle  Impression: Soft tissue swelling without evidence of displaced fracture.    Electronically signed by: Abner Sanchez MD  Date:    10/01/2023  Time:    10:38  X-Ray Ankle Complete Left  Narrative: EXAMINATION:  XR ANKLE COMPLETE 3 VIEW LEFT; XR FOOT COMPLETE 3 VIEW LEFT    CLINICAL HISTORY:  Unspecified injury of left ankle, initial encounter    TECHNIQUE:  Three views of the left foot and ankle were performed.    COMPARISON:  None    FINDINGS:  Osseous structures appear intact without evidence of osseous destructive process, fracture or dislocation.    No significant degenerative change.    Soft tissue swelling about the ankle  Impression: Soft tissue swelling without evidence of displaced fracture.    Electronically signed by: Abner Sanchez MD  Date:    10/01/2023  Time:    10:38    I have personally reviewed the x-ray images and report today.  There are no acute findings.  No fracture or dislocation.  Normal alignment.    IMPRESSION       ICD-10-CM ICD-9-CM   1. Sprain of anterior talofibular ligament of left ankle, initial encounter  S93.492A 845.09       MEDICATIONS PRESCRIBED      None    RECOMMENDATIONS     Physical therapy for left ankle strengthening, stabilization, and edema reduction  Lace-up ankle brace given today and should be worn during ambulation  Progress weight-bearing as tolerated  Continue to ice and elevate foot frequently for swelling reduction  Return to clinic in 1 month for repeat evaluation      All questions were answered, pt will contact us for questions or concerns in the interim.

## 2023-10-05 ENCOUNTER — CLINICAL SUPPORT (OUTPATIENT)
Dept: REHABILITATION | Facility: OTHER | Age: 63
End: 2023-10-05
Payer: COMMERCIAL

## 2023-10-05 DIAGNOSIS — S93.492A SPRAIN OF ANTERIOR TALOFIBULAR LIGAMENT OF LEFT ANKLE, INITIAL ENCOUNTER: ICD-10-CM

## 2023-10-05 PROCEDURE — 97161 PT EVAL LOW COMPLEX 20 MIN: CPT | Mod: PN | Performed by: INTERNAL MEDICINE

## 2023-10-05 PROCEDURE — 97112 NEUROMUSCULAR REEDUCATION: CPT | Mod: PN | Performed by: INTERNAL MEDICINE

## 2023-10-05 NOTE — PROGRESS NOTES
[facility]  Physical Therapy Initial Evaluation    Date: 10/5/2023   Name: Irene Lawton  Clinic Number: [id number]    Therapy Diagnosis:   Encounter Diagnosis   Name Primary?    Sprain of anterior talofibular ligament of left ankle, initial encounter      Physician: Adriel March, ELIZABETH    Physician Orders: PT Eval and Treat    Medical Diagnosis from Referral: S93.492A (ICD-10-CM) - Sprain of anterior talofibular ligament of left ankle, initial encounter   Evaluation Date: 10/5/2023  Authorization Period Expiration: 10/2/24  Plan of Care Expiration: 1/1/24  Progress Note Due: 11/4/23  Visit # / Visits authorized: 1/ 20   FOTO: 10/6    Precautions: Standard, HTN    Time In: 11a  Time Out: 1150   Total Appointment Time (timed & untimed codes): 50 minutes      SUBJECTIVE   Date of onset: 5 days ago    History of current condition - Ruby reports: fell Sunday morning in backyard moving a chair across uneven concrete. Turned when family member called her  and slipped on gravel . Ankle was aching a little immediately but then started aching more.  Using ace bandage, 2 different boots. Rec'd fig 8 brace from ortho but can't fit it in regular show. Likes smaller ankle  boot and fig 8 together. Crutches seem to  hurt, and she walks faster without crutches.  Pain is improving.     Falls: 1 on Sun    Imaging,    EXAMINATION:  XR ANKLE COMPLETE 3 VIEW LEFT; XR FOOT COMPLETE 3 VIEW LEFT     CLINICAL HISTORY:  Unspecified injury of left ankle, initial encounter     TECHNIQUE:  Three views of the left foot and ankle were performed.     COMPARISON:  None     FINDINGS:  Osseous structures appear intact without evidence of osseous destructive process, fracture or dislocation.     No significant degenerative change.     Soft tissue swelling about the ankle     Impression:     Soft tissue swelling without evidence of displaced fracture.  Prior Therapy: none  Social History:  lives with mom. No  stairs at home or work  Occupation: [employer]    Prior Level of Function:   walk I , occas for ex but not regularly.   Current Level of Function:   enjoys reading and watching movies. Warm baths    Pain:  Current 5/10, worst 9/10    Location: left lateral ankle    Aggravating Factors:  walking/ shopping with some discomfort.  Easing Factors: rest, ibuprofen occas. Hard to elevate at night. Ice in evening, elevation when she can    Patients goals: decrease pain, r/t walking I      Medical History:   Past Medical History:   Diagnosis Date    ALLERGIC RHINITIS     Anemia     Anxiety     Cancer     right neck    Hypertension     Transient elevated blood pressure        Surgical History:   Irene Lawton  has a past surgical history that includes Tubal ligation; Surgical removal neck cancer (Right); Colonoscopy (N/A, 12/12/2020); Skin biopsy; Laparoscopic total hysterectomy (N/A, 5/12/2023); laparoscopic suspension of uterosacral ligament (N/A, 5/12/2023); Cystoscopy (5/12/2023); Lysis of adhesions (N/A, 5/12/2023); and colporrhaphy, combined anteroposterior (N/A, 5/12/2023).    Medications:   Irene has a current medication list which includes the following prescription(s): docusate sodium, estradiol, fluticasone propionate, hydrochlorothiazide, hydrocodone-acetaminophen, ibuprofen, ibuprofen, multivitamin, oxybutynin, and sulfamethoxazole-trimethoprim 800-160mg.    Allergies:   Review of patient's allergies indicates:   Allergen Reactions    No known allergies            Objective     Amb with fig 8 boot and short boot    10/5/2023 MMT:    Hip flex B 5  Hip abd B 4+  Hip ext B 4+  Knee ext B 5  Knee flex B 5     AROM 10/5/23  Ankle dorsiflexion  R  5   , L - 8   Ankle plantar flexion R   40  , L 45  Ankle inversion  R  30   , L 15  Ankle eversion  R  20  , L  5         10/5/2023   Flexibility testing:  HS flex WFL B        10/5/2023  Palpation/ joint mob:   ttp ant talofib L, min edema,  nonpitting  23.25 R,  23. 5  L GIRTH  FIG 8     Intake Outcome Measure for FOTO ankle  Survey    Therapist reviewed FOTO scores for Irene Lawton on 10/5/2023.   FOTO documents entered into [employer] - see Media section.    Intake Score: 46%  Goal 71         TREATMENT   Treatment Time In: 1130  Treatment Time Out: 1155  Total Treatment time separate from Evaluation time:25    Irene received therapeutic exercises to develop strength, endurance, ROM, flexibility, posture, and core stabilization for 3 minutes including:     Calf stretch 30 sec x 3  Seated strap         Irene received the following manual therapy techniques: Joint mobilizations were applied to the: L ankle for 5  minutes, including:  AP/ PA gr 1 lat malleolus jm, prom ankle gentle all directions 20 sec x 3     Irene participated in neuromuscular re-education activities to improve: Balance, Coordination, Kinesthetic, Sense, Proprioception, and Posture for 15 minutes. The following activities were included:  Ankle pumps 20x  Inv/ ev 20 x   SLR 20x  Towel squeezes 30x      Irene participated in gait training to improve functional mobility and safety for 0  minutes, including:  Na      Education provided re: okay  to try fig 8 with tennis shoe if it fits, 1 crutch for longer distances. Can remove medial support in fig  8 brace if desired.     Written Home Exercises Provided: see above  Exercises were reviewed and Irene was able to demonstrate them prior to the end of the session.   Pt received a written copy of exercises to perform at home. Irene demonstrated good  understanding of the education provided.     See EMR under MEDIA/ pt instructions  for exercises given 10/5/2023 .  Assessment   Irene is a 63 y.o. female referred to outpatient Physical Therapy with a medical diagnosis of L ankle sprain. Pt presents with edema, decreased strength and rom, gait deficits    Pt prognosis is Good.   Pt will benefit from skilled  outpatient Physical Therapy to address the deficits stated above and in the chart below, provide pt/family education, and to maximize pt's level of independence.      Goals:  Short Term Goals: 2 weeks          1. Decreased L  ankle pain to 4/10 Progressing, not met          2. Improved ankle arom to  Dorsiflexion to 0  Progressing, not met          3 Improved FOTO score Progressing, not met      Long Term Goals: 12 weeks     Independent with HEP.  Report decreased    L   Ankle  pain  <   / =  2  /10 with adls such as walking I without fig 8   Progressing, not met  Increased MMT  for  L LE to 5/5    Progressing, not met  Increased arom  for  L ankle wfl  including DF 10 or more  Progressing, not met  Improved FOTO score  to 71 Progressing, not met      History  Co-morbidities and personal factors that may impact the plan of care [x] LOW: no personal factors / co-morbidities  [] MODERATE: 1-2 personal factors / co-morbidities  [] HIGH: 3+ personal factors / co-morbidities    Moderate / High Support Documentation: NA     Examination  Body Structures and Functions, activity limitations and participation restrictions that may impact the plan of care [x] LOW: addressing 1-2 elements  [] MODERATE: 3+ elements  [] HIGH: 4+ elements (please support below)    Moderate / High Support Documentation: *NA     Clinical Presentation [x] LOW: stable  [] MODERATE: Evolving  [] HIGH: Unstable     Decision Making/ Complexity Score: low       Plan of care discussed with patient: Yes  Pt's spiritual, cultural and educational needs considered and patient is agreeable to the plan of care and goals as stated below:     Anticipated Barriers for therapy: none    Plan   Certification Period/Plan of care expiration:see above.      Outpatient Physical Therapy 1 times weekly for 10 weeks to include the following interventions: Gait Training, Manual Therapy, Moist Heat/ Ice, Neuromuscular Re-ed, Patient Education, Self Care, Therapeutic  Activities, and Therapeutic Exercise, dry needling.     Ruba Gonzalez, PT

## 2023-10-12 ENCOUNTER — CLINICAL SUPPORT (OUTPATIENT)
Dept: REHABILITATION | Facility: OTHER | Age: 63
End: 2023-10-12
Payer: COMMERCIAL

## 2023-10-12 DIAGNOSIS — S93.492D SPRAIN OF ANTERIOR TALOFIBULAR LIGAMENT OF LEFT ANKLE, SUBSEQUENT ENCOUNTER: ICD-10-CM

## 2023-10-12 DIAGNOSIS — M25.572 ACUTE LEFT ANKLE PAIN: ICD-10-CM

## 2023-10-12 PROCEDURE — 97530 THERAPEUTIC ACTIVITIES: CPT | Mod: PN | Performed by: INTERNAL MEDICINE

## 2023-10-12 PROCEDURE — 97112 NEUROMUSCULAR REEDUCATION: CPT | Mod: PN | Performed by: INTERNAL MEDICINE

## 2023-10-12 PROCEDURE — 97140 MANUAL THERAPY 1/> REGIONS: CPT | Mod: PN | Performed by: INTERNAL MEDICINE

## 2023-10-12 NOTE — PROGRESS NOTES
Physical Therapy Daily Treatment Note     Name: Irene Lawton  Clinic Number: [id number]    Therapy Diagnosis:   Encounter Diagnoses   Name Primary?    Acute left ankle pain     Sprain of anterior talofibular ligament of left ankle, subsequent encounter      Physician: Adriel March PA-C    Visit Date: 10/12/2023       Physician: Adriel March PA-C     Physician Orders: PT Eval and Treat    Medical Diagnosis from Referral: S93.492A (ICD-10-CM) - Sprain of anterior talofibular ligament of left ankle, initial encounter   Evaluation Date: 10/5/2023  Authorization Period Expiration: 10/2/24  Plan of Care Expiration: 1/1/24  Progress Note Due: 11/4/23  Visit # / Visits authorized: 2/ 20   FOTO: 10/6     Precautions: Standard, HTN     Time In: 11a  Time Out: 1150   Total Appointment Time (timed & untimed codes): 50 minutes        Subjective     Pt reports: her ankle is feeling better. Cont wearing fig 8 and short boot.  She was compliant with home exercise program.  Response to previous treatment: less pain  Functional change: improved pain    Pain: 6/10  Location: left ankles     Objective       Amb with fig 8 boot and short boot     10/5/2023 MMT:     Hip flex B 5  Hip abd B 4+  Hip ext B 4+  Knee ext B 5  Knee flex B 5     AROM 10/5/23  Ankle dorsiflexion  R  5   , L - 8   Ankle plantar flexion R   40  , L 45  Ankle inversion  R  30   , L 15  Ankle eversion  R  20  , L  5           10/5/2023   Flexibility testing:  HS flex WFL B         10/5/2023  Palpation/ joint mob:   ttp ant talofib L, min edema, nonpitting  23.25 R,  23. 5  L GIRTH  FIG 8      Intake Outcome Measure for FOTO ankle  Survey     Therapist reviewed FOTO scores for Irene Lawton on 10/5/2023.   FOTO documents entered into [employer] system - see Media section.     Intake Score: 46%  Goal 71            TREATMENT         Irene received therapeutic exercises to develop strength, endurance, ROM, flexibility, posture, and core stabilization for 3  minutes including:     Calf stretch 30 sec x 3  Seated strap      Irene received the following manual therapy techniques: Joint mobilizations were applied to the: L ankle for 8  minutes, including:  AP/ PA gr 1 lat malleolus jm, prom ankle gentle all directions 20 sec x 3      Irene participated in neuromuscular re-education activities to improve: Balance, Coordination, Kinesthetic, Sense, Proprioception, and Posture for 31 minutes. The following activities were included:    Inv/ ev/ dorsiflexion 20 x yellow theraband   SLR 20x  Towel squeezes 30x np  [place]  3 min  Archisizer 90 sec    calf raises 20x   Sls 10 sec x 3   March on foam 2 min       There activities   for r/t function, activitiies x 8 min including:   + mini squats 20x   Recumb bike seat 9 for 5 min      Irene participated in gait training to improve functional mobility and safety for 0  minutes, including:  Na      Education provided re: theraband, new ex     Written Home Exercises Provided: ankle theraband, mini squats, sls  Exercises were reviewed and Irene was able to demonstrate them prior to the end of the session.   Pt received a written copy of exercises to perform at home. Irene demonstrated good  understanding of the education provided.      See EMR under MEDIA/ pt instructions  for exercises given 10/5/2023 , 10/12          Assessment     Progressing with improved ex ability, mild to mod lob with sls but minimal pain  Irene Is progressing well towards her goals.   Pt prognosis is Good.     Pt will continue to benefit from skilled outpatient physical therapy to address the deficits listed in the problem list box on initial evaluation, provide pt/family education and to maximize pt's level of independence in the home and community environment.     Pt's spiritual, cultural and educational needs considered and pt agreeable to plan of care and goals.    Anticipated barriers to physical therapy: none      Short  Term Goals: 2 weeks          1. Decreased L  ankle pain to 4/10 Progressing, not met          2. Improved ankle arom to  Dorsiflexion to 0  Progressing, not met          3 Improved FOTO score Progressing, not met        Long Term Goals: 12 weeks      Independent with HEP.  Report decreased    L   Ankle  pain  <   / =  2  /10 with adls such as walking I without fig 8   Progressing, not met  Increased MMT  for  L LE to 5/5    Progressing, not met  Increased arom  for  L ankle wfl  including DF 10 or more  Progressing, not met  Improved FOTO score  to 71 Progressing, not met       Plan     Certification Period/Plan of care expiration:see above.       Outpatient Physical Therapy 1 times weekly for 9  weeks to include the following interventions: Gait Training, Manual Therapy, Moist Heat/ Ice, Neuromuscular Re-ed, Patient Education, Self Care, Therapeutic Activities, and Therapeutic Exercise, dry needling.       Ruba Gonzalez, PT

## 2023-10-14 PROBLEM — S93.492A SPRAIN OF ANTERIOR TALOFIBULAR LIGAMENT OF LEFT ANKLE: Status: ACTIVE | Noted: 2023-10-14

## 2023-10-14 PROBLEM — M25.572 ACUTE LEFT ANKLE PAIN: Status: ACTIVE | Noted: 2023-10-14

## 2023-10-19 ENCOUNTER — CLINICAL SUPPORT (OUTPATIENT)
Dept: REHABILITATION | Facility: OTHER | Age: 63
End: 2023-10-19
Payer: COMMERCIAL

## 2023-10-19 DIAGNOSIS — S93.492D SPRAIN OF ANTERIOR TALOFIBULAR LIGAMENT OF LEFT ANKLE, SUBSEQUENT ENCOUNTER: ICD-10-CM

## 2023-10-19 DIAGNOSIS — M25.572 ACUTE LEFT ANKLE PAIN: Primary | ICD-10-CM

## 2023-10-19 PROCEDURE — 97530 THERAPEUTIC ACTIVITIES: CPT | Mod: PN | Performed by: INTERNAL MEDICINE

## 2023-10-19 PROCEDURE — 97112 NEUROMUSCULAR REEDUCATION: CPT | Mod: PN | Performed by: INTERNAL MEDICINE

## 2023-10-19 PROCEDURE — 97110 THERAPEUTIC EXERCISES: CPT | Mod: PN | Performed by: INTERNAL MEDICINE

## 2023-10-19 NOTE — PROGRESS NOTES
Physical Therapy Daily Treatment Note     Name: Irene Lawton  Clinic Number: [id number]    Therapy Diagnosis:   Encounter Diagnoses   Name Primary?    Acute left ankle pain     Sprain of anterior talofibular ligament of left ankle, subsequent encounter      Physician: Adriel March PA-C    Visit Date: 10/19/2023       Physician: Adriel March PA-C     Physician Orders: PT Eval and Treat    Medical Diagnosis from Referral: S93.492A (ICD-10-CM) - Sprain of anterior talofibular ligament of left ankle, initial encounter   Evaluation Date: 10/5/2023  Authorization Period Expiration: 10/2/24  Plan of Care Expiration: 1/1/24  Progress Note Due: 11/4/23  Visit # / Visits authorized: 3/ 20   FOTO: 10/6, 10/19/23     Precautions: Standard, HTN     Time In: 11a  Time Out: 1150   Total Appointment Time (timed & untimed codes): 50 minutes        Subjective     Pt reports: stopped wearing fig 8 brace due to skin irritation.Cont wearing darco shoe.  She was compliant with home exercise program.  Response to previous treatment: less pain  Functional change: improved pain    Pain: 4/10  Location: left ankle     Objective       Amb with darco shoe L     10/5/2023 MMT:     Hip flex B 5  Hip abd B 4+  Hip ext B 4+  Knee ext B 5  Knee flex B 5     AROM 10/19/23  Ankle dorsiflexion  R  5   , L -6    Ankle plantar flexion R   40  , L 40  Ankle inversion  R  30   , L  20   Ankle eversion  R  20  , L   20           10/5/2023   Flexibility testing:  HS flex WFL B         10/19 2023  Palpation/ joint mob:   ttp ant talofib L, min edema, nonpitting  10/5   23.25 in R,  23. 5  in  L GIRTH  FIG 8      Intake Outcome Measure for FOTO ankle  Survey     Therapist reviewed FOTO scores for Irene Lawton on 10/19/2023.   FOTO documents entered into [employer] - see Media section.     Intake Score: 46%  Goal 71            TREATMENT         Irene received therapeutic exercises to develop strength, endurance, ROM, flexibility, posture,  and core stabilization for 0 minutes including:     Calf stretch 30 sec x 3  Seated strap np      Irene received the following manual therapy techniques: Joint mobilizations were applied to the: L ankle for 8  minutes, including:  AP/ PA gr 1-2 lat malleolus jm, prom ankle as keshia all directions 20 sec x 3      Irene participated in neuromuscular re-education activities to improve: Balance, Coordination, Kinesthetic, Sense, Proprioception, and Posture for 34 minutes. The following activities were included:    Inv/ ev/ dorsiflexion 30 x  red theraband   SLR 20x  Towel squeezes 30x np  [place]  3 min np     calf raises 3x  10   Sls 15 sec x 3   SLS t/f 3 # weight r to L UE   March on foam 2 min   +Hesitation walk 30 ft x 2   +Side step 40 ft       There activities   for r/t function, activitiies x 8 min including:   mini squats 20x   Recumb bike seat 9 for 5 min      Irene participated in gait training to improve functional mobility and safety for 0  minutes, including:  Na      Education provided re: theraband, new ex     Written Home Exercises Provided: ankle theraband, mini squats, sls  Exercises were reviewed and Irene was able to demonstrate them prior to the end of the session.   Pt received a written copy of exercises to perform at home. Irene demonstrated good  understanding of the education provided.      See EMR under MEDIA/ pt instructions  for exercises given 10/5/2023 , 10/12          Assessment     Progressing with improved pain, foto score, arom L ankle.  Ed she can try winter type boot for her shoe wear since fig 8 brace aggravtes her skin.   Irene Is progressing well towards her goals.   Pt prognosis is Good.     Pt will continue to benefit from skilled outpatient physical therapy to address the deficits listed in the problem list box on initial evaluation, provide pt/family education and to maximize pt's level of independence in the home and community environment.      Pt's spiritual, cultural and educational needs considered and pt agreeable to plan of care and goals.    Anticipated barriers to physical therapy: none      Short Term Goals: 2 weeks          1. Decreased L  ankle pain to 4/10   met          2. Improved ankle arom to  Dorsiflexion to 0  Progressing, not met          3 Improved FOTO score   met        Long Term Goals: 12 weeks      Independent with HEP.  Report decreased    L   Ankle  pain  <   / =  2  /10 with adls such as walking I without fig 8   Progressing, not met  Increased MMT  for  L LE to 5/5    Progressing, not met  Increased arom  for  L ankle wfl  including DF 10 or more  Progressing, not met  Improved FOTO score  to 71 Progressing, not met       Plan     Certification Period/Plan of care expiration:see above.       Outpatient Physical Therapy 1 times weekly for 8 weeks to include the following interventions: Gait Training, Manual Therapy, Moist Heat/ Ice, Neuromuscular Re-ed, Patient Education, Self Care, Therapeutic Activities, and Therapeutic Exercise, dry needling.       Ruba Gonzalez, PT

## 2023-11-02 ENCOUNTER — OFFICE VISIT (OUTPATIENT)
Dept: PRIMARY CARE CLINIC | Facility: CLINIC | Age: 63
End: 2023-11-02
Attending: FAMILY MEDICINE
Payer: COMMERCIAL

## 2023-11-02 VITALS
OXYGEN SATURATION: 100 % | SYSTOLIC BLOOD PRESSURE: 154 MMHG | HEIGHT: 62 IN | WEIGHT: 193.31 LBS | BODY MASS INDEX: 35.57 KG/M2 | HEART RATE: 58 BPM | DIASTOLIC BLOOD PRESSURE: 69 MMHG

## 2023-11-02 DIAGNOSIS — H00.011 HORDEOLUM EXTERNUM OF RIGHT UPPER EYELID: ICD-10-CM

## 2023-11-02 DIAGNOSIS — H00.011 HORDEOLUM EXTERNUM OF RIGHT UPPER EYELID: Primary | ICD-10-CM

## 2023-11-02 PROBLEM — S93.492A SPRAIN OF ANTERIOR TALOFIBULAR LIGAMENT OF LEFT ANKLE: Status: RESOLVED | Noted: 2023-10-14 | Resolved: 2023-11-02

## 2023-11-02 PROBLEM — Z12.11 SCREEN FOR COLON CANCER: Status: RESOLVED | Noted: 2020-12-12 | Resolved: 2023-11-02

## 2023-11-02 PROBLEM — M25.572 ACUTE LEFT ANKLE PAIN: Status: RESOLVED | Noted: 2023-10-14 | Resolved: 2023-11-02

## 2023-11-02 PROCEDURE — 3044F HG A1C LEVEL LT 7.0%: CPT | Mod: CPTII,S$GLB,, | Performed by: FAMILY MEDICINE

## 2023-11-02 PROCEDURE — 3008F BODY MASS INDEX DOCD: CPT | Mod: CPTII,S$GLB,, | Performed by: FAMILY MEDICINE

## 2023-11-02 PROCEDURE — 99999 PR PBB SHADOW E&M-EST. PATIENT-LVL III: CPT | Mod: PBBFAC,,, | Performed by: FAMILY MEDICINE

## 2023-11-02 PROCEDURE — 3044F PR MOST RECENT HEMOGLOBIN A1C LEVEL <7.0%: ICD-10-PCS | Mod: CPTII,S$GLB,, | Performed by: FAMILY MEDICINE

## 2023-11-02 PROCEDURE — 1160F RVW MEDS BY RX/DR IN RCRD: CPT | Mod: CPTII,S$GLB,, | Performed by: FAMILY MEDICINE

## 2023-11-02 PROCEDURE — 1159F MED LIST DOCD IN RCRD: CPT | Mod: CPTII,S$GLB,, | Performed by: FAMILY MEDICINE

## 2023-11-02 PROCEDURE — 1160F PR REVIEW ALL MEDS BY PRESCRIBER/CLIN PHARMACIST DOCUMENTED: ICD-10-PCS | Mod: CPTII,S$GLB,, | Performed by: FAMILY MEDICINE

## 2023-11-02 PROCEDURE — 3077F PR MOST RECENT SYSTOLIC BLOOD PRESSURE >= 140 MM HG: ICD-10-PCS | Mod: CPTII,S$GLB,, | Performed by: FAMILY MEDICINE

## 2023-11-02 PROCEDURE — 99999 PR PBB SHADOW E&M-EST. PATIENT-LVL III: ICD-10-PCS | Mod: PBBFAC,,, | Performed by: FAMILY MEDICINE

## 2023-11-02 PROCEDURE — 99203 OFFICE O/P NEW LOW 30 MIN: CPT | Mod: S$GLB,,, | Performed by: FAMILY MEDICINE

## 2023-11-02 PROCEDURE — 3077F SYST BP >= 140 MM HG: CPT | Mod: CPTII,S$GLB,, | Performed by: FAMILY MEDICINE

## 2023-11-02 PROCEDURE — 3078F DIAST BP <80 MM HG: CPT | Mod: CPTII,S$GLB,, | Performed by: FAMILY MEDICINE

## 2023-11-02 PROCEDURE — 1159F PR MEDICATION LIST DOCUMENTED IN MEDICAL RECORD: ICD-10-PCS | Mod: CPTII,S$GLB,, | Performed by: FAMILY MEDICINE

## 2023-11-02 PROCEDURE — 3078F PR MOST RECENT DIASTOLIC BLOOD PRESSURE < 80 MM HG: ICD-10-PCS | Mod: CPTII,S$GLB,, | Performed by: FAMILY MEDICINE

## 2023-11-02 PROCEDURE — 99203 PR OFFICE/OUTPT VISIT, NEW, LEVL III, 30-44 MIN: ICD-10-PCS | Mod: S$GLB,,, | Performed by: FAMILY MEDICINE

## 2023-11-02 PROCEDURE — 3008F PR BODY MASS INDEX (BMI) DOCUMENTED: ICD-10-PCS | Mod: CPTII,S$GLB,, | Performed by: FAMILY MEDICINE

## 2023-11-02 RX ORDER — BACITRACIN 500 [USP'U]/G
OINTMENT OPHTHALMIC EVERY 4 HOURS
Qty: 3.5 G | Refills: 0 | Status: SHIPPED | OUTPATIENT
Start: 2023-11-02 | End: 2023-11-10

## 2023-11-02 RX ORDER — BACITRACIN 500 [USP'U]/G
OINTMENT OPHTHALMIC EVERY 4 HOURS
Qty: 3.5 G | Refills: 0 | Status: SHIPPED | OUTPATIENT
Start: 2023-11-02 | End: 2023-11-02 | Stop reason: SDUPTHER

## 2023-11-02 NOTE — PROGRESS NOTES
FAMILY MEDICINE  [facility]    Reason for visit:   Chief Complaint   Patient presents with    Eye Problem       HPI: Irene Lawton is a 63 y.o. female  - with hypertension presents as a new patient for urgent care appointment with eye concerns.  PCP:  Aury Richards MD    1. Eye concerns    Irene Lawton reports that symptoms started about 2 days ago.  She reports that she went to the pharmacy for some recommendations and the pharmacist recommended an over-the-counter ointment.  She is unsure what is in the ointment besides petroleum jelly.  She has been doing warm compresses with a warm towel.  She reports it is not seem to be improving.  She is not had prior issues in the past.  She reports the symptoms are swelling of her right upper eyelid.  She reports it is erythematous.  She denies any itching, pain or destruction her vision.            Review of Systems   All other systems reviewed and are negative.      Social History     Social History Narrative    Works at [employer] and does not exercise regularly         ALLERGIES:   Review of patient's allergies indicates:   Allergen Reactions    No known allergies        MEDS:   Current Outpatient Medications on File Prior to Visit   Medication Sig Dispense Refill Last Dose    fluticasone propionate (FLONASE) 50 mcg/actuation nasal spray 1 spray (50 mcg total) by Each Nostril route once daily. 16 g 11 Taking    hydroCHLOROthiazide (HYDRODIURIL) 12.5 MG Tab Take 1 tablet (12.5 mg total) by mouth once daily. 30 tablet 11 Taking    ibuprofen (ADVIL,MOTRIN) 600 MG tablet Take 1 tablet (600 mg total) by mouth every 6 (six) hours as needed for Pain. 20 tablet 0 Taking    MULTIVITAMIN ORAL Take by mouth.   Taking    docusate sodium (COLACE) 100 MG capsule Take 1 capsule (100 mg total) by mouth 2 (two) times daily. (Patient not taking: Reported on 11/2/2023) 60 capsule 0 Not Taking    estradioL (ESTRACE) 0.01 % (0.1 mg/gram) vaginal  "cream Place 0.5 g vaginally twice a week. Apply to the vagina daily for two weeks then twice a week. (Patient not taking: Reported on 11/2/2023) 45 g 4 Not Taking    ibuprofen (ADVIL,MOTRIN) 600 MG tablet Take 1 tablet (600 mg total) by mouth every 6 (six) hours as needed for Pain. (Patient not taking: Reported on 7/19/2023) 30 tablet 1 Not Taking    sulfamethoxazole-trimethoprim 800-160mg (BACTRIM DS) 800-160 mg Tab Take 1 tablet by mouth once daily. (Patient not taking: Reported on 11/2/2023) 10 tablet 0 Not Taking    [DISCONTINUED] HYDROcodone-acetaminophen (NORCO)  mg per tablet Take 1 tablet by mouth every 6 (six) hours as needed for Pain. (Patient not taking: Reported on 11/2/2023) 30 tablet 0 Not Taking    [DISCONTINUED] oxybutynin (DITROPAN) 5 MG Tab Take 1 tablet (5 mg total) by mouth 3 (three) times daily as needed (bladder spasms). 30 tablet 0        Vital signs:   Vitals:    11/02/23 1301   BP: (!) 154/69   Pulse: (!) 58   SpO2: 100%   Weight: 87.7 kg (193 lb 5.5 oz)   Height: 5' 2" (1.575 m)     Body mass index is 35.36 kg/m².    PHYSICAL EXAM:     Physical Exam  Constitutional:       General: She is not in acute distress.  Eyes:      General:         Right eye: Discharge (Mild serous discharge) and hordeolum present. No foreign body.         Left eye: No foreign body, discharge or hordeolum.      Extraocular Movements: Extraocular movements intact.      Conjunctiva/sclera: Conjunctivae normal.     Pulmonary:      Effort: Pulmonary effort is normal. No respiratory distress.   Neurological:      Mental Status: She is alert.   Psychiatric:         Speech: Speech normal.           PERTINENT RESULTS:  none    ASSESSMENT/PLAN:    1. Hordeolum externum of right upper eyelid  Overview:  - recommend warm compress 5-10 minutes 2-4 times today   -recommend gentle massage after warm compress recommend apply bacitracin ophthalmic ointment every 4-6 hours     Orders:  -     bacitracin (AK-TRACIN) ophthalmic " ointment; Place into the right eye every 4 (four) hours. for 7 days  Dispense: 3.5 g; Refill: 0          Vaccines recommended:  Shingles, COVID-19 booster and influenza vaccine    Follow-up as needed if no improvement recommend follow-up with PCP    This note is dictated using the M*Modal Fluency Direct word recognition program. There are word recognition mistakes that are occasionally missed on review.    Dr. Nevaeh Lofton D.O.   Family Medicine

## 2023-11-06 ENCOUNTER — PATIENT MESSAGE (OUTPATIENT)
Dept: PRIMARY CARE CLINIC | Facility: CLINIC | Age: 63
End: 2023-11-06
Payer: COMMERCIAL

## 2023-11-06 DIAGNOSIS — H00.011 HORDEOLUM EXTERNUM OF RIGHT UPPER EYELID: Primary | ICD-10-CM

## 2023-11-06 RX ORDER — DOXYCYCLINE 100 MG/1
100 CAPSULE ORAL 2 TIMES DAILY
Qty: 14 CAPSULE | Refills: 0 | Status: SHIPPED | OUTPATIENT
Start: 2023-11-06 | End: 2023-11-13

## 2023-11-08 ENCOUNTER — OFFICE VISIT (OUTPATIENT)
Dept: URGENT CARE | Facility: CLINIC | Age: 63
End: 2023-11-08
Payer: COMMERCIAL

## 2023-11-08 ENCOUNTER — TELEPHONE (OUTPATIENT)
Dept: INTERNAL MEDICINE | Facility: CLINIC | Age: 63
End: 2023-11-08
Payer: COMMERCIAL

## 2023-11-08 VITALS
HEART RATE: 86 BPM | DIASTOLIC BLOOD PRESSURE: 78 MMHG | BODY MASS INDEX: 35.51 KG/M2 | HEIGHT: 62 IN | WEIGHT: 193 LBS | TEMPERATURE: 99 F | RESPIRATION RATE: 18 BRPM | SYSTOLIC BLOOD PRESSURE: 168 MMHG | OXYGEN SATURATION: 99 %

## 2023-11-08 DIAGNOSIS — M25.561 RIGHT KNEE PAIN, UNSPECIFIED CHRONICITY: Primary | ICD-10-CM

## 2023-11-08 DIAGNOSIS — Z53.20 PROCEDURE NOT CARRIED OUT BECAUSE OF PATIENT'S DECISION: ICD-10-CM

## 2023-11-08 PROCEDURE — 99499 UNLISTED E&M SERVICE: CPT | Mod: S$GLB,,, | Performed by: NURSE PRACTITIONER

## 2023-11-08 PROCEDURE — 99499 NO LOS: ICD-10-PCS | Mod: S$GLB,,, | Performed by: NURSE PRACTITIONER

## 2023-11-08 NOTE — TELEPHONE ENCOUNTER
----- Message from Leonortyler Smith sent at 11/8/2023  3:49 PM CST -----  Contact: Pt [phone]  [email or web address]     Patient is calling for Medical Advice regarding: Her shoulder and knee is swollen and painful     How long has patient had these symptoms: Today after getting the flu shot    Pharmacy name and phone#: [phone] [id number] - [id number] Phone:  [phone] Fax:  [phone]    Would like response via Octam[employer]:  Either

## 2023-11-08 NOTE — TELEPHONE ENCOUNTER
Pt received flu shot at an outside pharmacy. She is complainint that since getting flu shot today, her shoulder and knee are giving her pain and are swollen.  Advised pt to be seen in urgent care

## 2023-11-14 ENCOUNTER — OFFICE VISIT (OUTPATIENT)
Dept: OPHTHALMOLOGY | Facility: CLINIC | Age: 63
End: 2023-11-14
Payer: COMMERCIAL

## 2023-11-14 DIAGNOSIS — H00.011 HORDEOLUM EXTERNUM OF RIGHT UPPER EYELID: ICD-10-CM

## 2023-11-14 DIAGNOSIS — H00.11 CHALAZION OF RIGHT UPPER EYELID: Primary | ICD-10-CM

## 2023-11-14 PROCEDURE — 99999 PR PBB SHADOW E&M-EST. PATIENT-LVL III: ICD-10-PCS | Mod: PBBFAC,,, | Performed by: OPHTHALMOLOGY

## 2023-11-14 PROCEDURE — 1160F RVW MEDS BY RX/DR IN RCRD: CPT | Mod: CPTII,S$GLB,, | Performed by: OPHTHALMOLOGY

## 2023-11-14 PROCEDURE — 1160F PR REVIEW ALL MEDS BY PRESCRIBER/CLIN PHARMACIST DOCUMENTED: ICD-10-PCS | Mod: CPTII,S$GLB,, | Performed by: OPHTHALMOLOGY

## 2023-11-14 PROCEDURE — 99999 PR PBB SHADOW E&M-EST. PATIENT-LVL III: CPT | Mod: PBBFAC,,, | Performed by: OPHTHALMOLOGY

## 2023-11-14 PROCEDURE — 92002 INTRM OPH EXAM NEW PATIENT: CPT | Mod: S$GLB,,, | Performed by: OPHTHALMOLOGY

## 2023-11-14 PROCEDURE — 1159F PR MEDICATION LIST DOCUMENTED IN MEDICAL RECORD: ICD-10-PCS | Mod: CPTII,S$GLB,, | Performed by: OPHTHALMOLOGY

## 2023-11-14 PROCEDURE — 92002 PR EYE EXAM, NEW PATIENT,INTERMED: ICD-10-PCS | Mod: S$GLB,,, | Performed by: OPHTHALMOLOGY

## 2023-11-14 PROCEDURE — 3044F PR MOST RECENT HEMOGLOBIN A1C LEVEL <7.0%: ICD-10-PCS | Mod: CPTII,S$GLB,, | Performed by: OPHTHALMOLOGY

## 2023-11-14 PROCEDURE — 3044F HG A1C LEVEL LT 7.0%: CPT | Mod: CPTII,S$GLB,, | Performed by: OPHTHALMOLOGY

## 2023-11-14 PROCEDURE — 1159F MED LIST DOCD IN RCRD: CPT | Mod: CPTII,S$GLB,, | Performed by: OPHTHALMOLOGY

## 2023-11-14 NOTE — PROGRESS NOTES
HPI    Patient is here today due to a stye on OD eyelid. States it occurred 2   weeks ago. Patient has been using warm compresses but did not see any   changes with it. Patient was taking Bactrim and Doxycycline. No pain or   discomfort.     Eye Meds: None  Past Ocular Sx: None  Last edited by Jannette Manjarrez on 11/14/2023  3:25 PM.            Assessment /Plan     For exam results, see Encounter Report.    Chalazion of right upper eyelid    Hordeolum externum of right upper eyelid  -     Ambulatory referral/consult to Ophthalmology      TRUDI, MASSAGE  CHALZION, MONITOR

## 2023-11-20 ENCOUNTER — HOSPITAL ENCOUNTER (OUTPATIENT)
Dept: RADIOLOGY | Facility: OTHER | Age: 63
Discharge: HOME | End: 2023-11-20
Attending: INTERNAL MEDICINE
Payer: COMMERCIAL

## 2023-11-20 DIAGNOSIS — Z12.31 ENCOUNTER FOR SCREENING MAMMOGRAM FOR BREAST CANCER: ICD-10-CM

## 2023-11-20 PROCEDURE — 77063 BREAST TOMOSYNTHESIS BI: CPT | Mod: 26,,, | Performed by: RADIOLOGY

## 2023-11-20 PROCEDURE — 77067 SCR MAMMO BI INCL CAD: CPT | Mod: TC

## 2023-11-20 PROCEDURE — 77067 MAMMO DIGITAL SCREENING BILAT WITH TOMO: ICD-10-PCS | Mod: 26,,, | Performed by: RADIOLOGY

## 2023-11-20 PROCEDURE — 77067 SCR MAMMO BI INCL CAD: CPT | Mod: 26,,, | Performed by: RADIOLOGY

## 2023-11-20 PROCEDURE — 77063 MAMMO DIGITAL SCREENING BILAT WITH TOMO: ICD-10-PCS | Mod: 26,,, | Performed by: RADIOLOGY

## 2023-12-04 RX ORDER — IBUPROFEN 600 MG/1
600 TABLET ORAL EVERY 6 HOURS PRN
Qty: 20 TABLET | Refills: 0 | Status: SHIPPED | OUTPATIENT
Start: 2023-12-04 | End: 2024-02-09

## 2023-12-04 RX ORDER — IBUPROFEN 800 MG/1
800 TABLET ORAL
Qty: 90 TABLET | Refills: 0 | Status: SHIPPED | OUTPATIENT
Start: 2023-12-04

## 2023-12-04 NOTE — TELEPHONE ENCOUNTER
No care due was identified.  [facility] Embedded Care Due Messages. Reference number: [id number].   12/04/2023 2:19:14 PM CST

## 2023-12-26 ENCOUNTER — OFFICE VISIT (OUTPATIENT)
Dept: OPTOMETRY | Facility: CLINIC | Age: 63
End: 2023-12-26
Payer: COMMERCIAL

## 2023-12-26 DIAGNOSIS — H25.13 NUCLEAR SCLEROSIS, BILATERAL: Primary | ICD-10-CM

## 2023-12-26 DIAGNOSIS — H52.13 MYOPIA OF BOTH EYES WITH ASTIGMATISM AND PRESBYOPIA: ICD-10-CM

## 2023-12-26 DIAGNOSIS — H52.4 MYOPIA OF BOTH EYES WITH ASTIGMATISM AND PRESBYOPIA: ICD-10-CM

## 2023-12-26 DIAGNOSIS — H52.203 MYOPIA OF BOTH EYES WITH ASTIGMATISM AND PRESBYOPIA: ICD-10-CM

## 2023-12-26 PROCEDURE — 92014 COMPRE OPH EXAM EST PT 1/>: CPT | Mod: S$GLB,,, | Performed by: OPTOMETRIST

## 2023-12-26 PROCEDURE — 3044F PR MOST RECENT HEMOGLOBIN A1C LEVEL <7.0%: ICD-10-PCS | Mod: CPTII,S$GLB,, | Performed by: OPTOMETRIST

## 2023-12-26 PROCEDURE — 99999 PR PBB SHADOW E&M-EST. PATIENT-LVL III: CPT | Mod: PBBFAC,,, | Performed by: OPTOMETRIST

## 2023-12-26 PROCEDURE — 92015 PR REFRACTION: ICD-10-PCS | Mod: S$GLB,,, | Performed by: OPTOMETRIST

## 2023-12-26 PROCEDURE — 3044F HG A1C LEVEL LT 7.0%: CPT | Mod: CPTII,S$GLB,, | Performed by: OPTOMETRIST

## 2023-12-26 PROCEDURE — 1159F MED LIST DOCD IN RCRD: CPT | Mod: CPTII,S$GLB,, | Performed by: OPTOMETRIST

## 2023-12-26 PROCEDURE — 99999 PR PBB SHADOW E&M-EST. PATIENT-LVL III: ICD-10-PCS | Mod: PBBFAC,,, | Performed by: OPTOMETRIST

## 2023-12-26 PROCEDURE — 92014 PR EYE EXAM, EST PATIENT,COMPREHESV: ICD-10-PCS | Mod: S$GLB,,, | Performed by: OPTOMETRIST

## 2023-12-26 PROCEDURE — 1159F PR MEDICATION LIST DOCUMENTED IN MEDICAL RECORD: ICD-10-PCS | Mod: CPTII,S$GLB,, | Performed by: OPTOMETRIST

## 2023-12-26 PROCEDURE — 92015 DETERMINE REFRACTIVE STATE: CPT | Mod: S$GLB,,, | Performed by: OPTOMETRIST

## 2023-12-26 NOTE — PROGRESS NOTES
HPI    Annual Exam   Pt states vision is stable       Pt denies F/F     Pt denies Dry/ Itchy/ Burning  Gtt: No    Pt reports chalazion is better    Last edited by Ace Wylie, OD on 12/26/2023  1:18 PM.            Assessment /Plan     For exam results, see Encounter Report.    Nuclear sclerosis, bilateral  -Educated patient on presence of cataracts at today's exam, monitor at annual dilated fundus exam. 5+ years surgical estimate.    Myopia of both eyes with astigmatism and presbyopia  Eyeglass Final Rx       Eyeglass Final Rx         Sphere Cylinder Axis Dist VA Add    Right -5.00 +0.50 165 20/20 +2.50    Left -5.25 Sphere  20/20 +2.50      Type: PAL    Expiration Date: 12/26/2024                      RTC 1 yr

## 2024-01-04 ENCOUNTER — OFFICE VISIT (OUTPATIENT)
Dept: GASTROENTEROLOGY | Facility: CLINIC | Age: 64
End: 2024-01-04
Payer: COMMERCIAL

## 2024-01-04 ENCOUNTER — TELEPHONE (OUTPATIENT)
Dept: INTERNAL MEDICINE | Facility: CLINIC | Age: 64
End: 2024-01-04
Payer: COMMERCIAL

## 2024-01-04 DIAGNOSIS — R14.0 BLOATING: Primary | ICD-10-CM

## 2024-01-04 PROCEDURE — 99204 OFFICE O/P NEW MOD 45 MIN: CPT | Mod: 95,,,

## 2024-01-04 RX ORDER — DICYCLOMINE HYDROCHLORIDE 10 MG/1
10 CAPSULE ORAL 2 TIMES DAILY PRN
Qty: 90 CAPSULE | Refills: 2 | Status: SHIPPED | OUTPATIENT
Start: 2024-01-04 | End: 2025-01-03

## 2024-01-04 NOTE — PATIENT INSTRUCTIONS
Start taking 1 capful of either milk of mag, mag citrate, or miralax daily   This will help keep BM's regular    Start taking dicyclomine/bentyl   This helps relax the gut and will help with bloating   Take as needed, or daily- just monitor for side effect of constipation    Please send me in an update in a week or so

## 2024-01-04 NOTE — PROGRESS NOTES
GASTROENTEROLOGY CLINIC NOTE    The patient location is: [place]  Visit type: audiovisual  Face to Face time with patient: 7mins  15 minutes of total time spent on the encounter, which includes face to face time and non-face to face time preparing to see the patient (eg, review of tests), Obtaining and/or reviewing separately obtained history, Documenting clinical information in the electronic or other health record, Independently interpreting results (not separately reported) and communicating results to the patient/family/caregiver, or Care coordination (not separately reported).     HPI:  Irene Lawton is a 63 y.o. female presents today for abd bloating. She reports symptoms began recently. Bloating is more in central abdomen. She typically has constipation, BM's occur intermittently. She had loose BM yesterday and small BM today- doesn't feel like she empties. She takes milk of mag PRN. No blood/black stool. She denies abd pain, but does endorse increase in bowel sounds. No N/V/F. UTD on CRC screening.     Prior Endoscopy:  EGD:  Colon: 2020 with Dr. Gamez:   - One 5 mm polyp in the descending colon, removed with a cold snare. Resected and retrieved.               - The examined portion of the ileum was normal.               - The distal rectum and anal verge are normal on retroflexion view.    - Repeat colonoscopy in 5 years for surveillance.         Component 3 yr ago   Final Pathologic Diagnosis Left colon polyp, polypectomy:  - Tubular adenoma  -  Negative  for high-grade dysplasia or malignancy          (Portions of this note were dictated using voice recognition software and may contain dictation related errors in spelling/grammar/syntax not found on text review)    Review of Systems   Gastrointestinal:  Positive for constipation. Negative for abdominal pain and blood in stool.       Past Medical History: has a past medical history of ALLERGIC RHINITIS, Anemia, Anxiety, Cancer, Hypertension,  Sprain of anterior talofibular ligament of left ankle, and Transient elevated blood pressure.    Past Surgical History: has a past surgical history that includes Tubal ligation; Surgical removal neck cancer (Right); Colonoscopy (N/A, 12/12/2020); Skin biopsy; Laparoscopic total hysterectomy (N/A, 5/12/2023); laparoscopic suspension of uterosacral ligament (N/A, 5/12/2023); Cystoscopy (5/12/2023); Lysis of adhesions (N/A, 5/12/2023); and colporrhaphy, combined anteroposterior (N/A, 5/12/2023).    Home medications:   Current Outpatient Medications on File Prior to Visit   Medication Sig Dispense Refill    docusate sodium (COLACE) 100 MG capsule Take 1 capsule (100 mg total) by mouth 2 (two) times daily. (Patient not taking: Reported on 11/8/2023) 60 capsule 0    estradioL (ESTRACE) 0.01 % (0.1 mg/gram) vaginal cream Place 0.5 g vaginally twice a week. Apply to the vagina daily for two weeks then twice a week. (Patient not taking: Reported on 11/2/2023) 45 g 4    fluticasone propionate (FLONASE) 50 mcg/actuation nasal spray 1 spray (50 mcg total) by Each Nostril route once daily. 16 g 11    hydroCHLOROthiazide (HYDRODIURIL) 12.5 MG Tab Take 1 tablet (12.5 mg total) by mouth once daily. 30 tablet 11    ibuprofen (ADVIL,MOTRIN) 600 MG tablet Take 1 tablet (600 mg total) by mouth every 6 (six) hours as needed for Pain. (Patient not taking: Reported on 7/19/2023) 30 tablet 1    ibuprofen (ADVIL,MOTRIN) 600 MG tablet Take 1 tablet (600 mg total) by mouth every 6 (six) hours as needed for Pain. 20 tablet 0    ibuprofen (ADVIL,MOTRIN) 800 MG tablet TAKE 1 TABLET BY MOUTH THREE TIMES DAILY AS NEEDED FOR PAIN 90 tablet 0    MULTIVITAMIN ORAL Take by mouth.      sulfamethoxazole-trimethoprim 800-160mg (BACTRIM DS) 800-160 mg Tab Take 1 tablet by mouth once daily. (Patient not taking: Reported on 11/2/2023) 10 tablet 0     No current facility-administered medications on file prior to visit.       Vital signs:  LMP 06/27/2012      Physical Exam  Vitals reviewed: limited d/t telemedicine visit.   Constitutional:       Appearance: Normal appearance.   Neurological:      Mental Status: She is alert.       Each patient to whom he or she provides medical services by telemedicine is:  (1) informed of the relationship between the physician and patient and the respective role of any other health care provider with respect to management of the patient; and (2) notified that he or she may decline to receive medical services by telemedicine and may withdraw from such care at any time.    I have reviewed associated labs, imaging and notes.     Assessment:  1. Bloating    Central abd bloating   Underlying constipation- milk of mag PRN   Not emptying, no relief in bloating with BM's  No N/V/F  UTD on CRC screening     Plan:  Orders Placed This Encounter    dicyclomine (BENTYL) 10 MG capsule     Trial bentyl  Start taking daily miralax/milk of mag/mag citrate for BM's    Lindsey Ray, NP [facility]

## 2024-01-04 NOTE — TELEPHONE ENCOUNTER
----- Message from Elayne Gallardo sent at 1/4/2024  8:28 AM CST -----  Contact: [phone]  Patient is requesting a call from the staff regarding: Gastro Referral

## 2024-01-05 NOTE — TELEPHONE ENCOUNTER
Spoke to pt and she c/o sharp abd pain, bloating, diarrhea, and a loud rumbling nosey sound in her stomach every hour all day. Pt request GI referral     Scheduled pt for appt on 01/10/2023

## 2024-01-10 ENCOUNTER — OFFICE VISIT (OUTPATIENT)
Dept: INTERNAL MEDICINE | Facility: CLINIC | Age: 64
End: 2024-01-10
Payer: COMMERCIAL

## 2024-01-10 VITALS
SYSTOLIC BLOOD PRESSURE: 134 MMHG | DIASTOLIC BLOOD PRESSURE: 86 MMHG | HEART RATE: 55 BPM | TEMPERATURE: 98 F | WEIGHT: 189.13 LBS | OXYGEN SATURATION: 100 % | HEIGHT: 62 IN | BODY MASS INDEX: 34.8 KG/M2

## 2024-01-10 DIAGNOSIS — R10.9 ABDOMINAL PAIN, UNSPECIFIED ABDOMINAL LOCATION: Primary | ICD-10-CM

## 2024-01-10 DIAGNOSIS — E66.9 OBESITY (BMI 30.0-34.9): ICD-10-CM

## 2024-01-10 PROCEDURE — 99999 PR PBB SHADOW E&M-EST. PATIENT-LVL III: CPT | Mod: PBBFAC,,, | Performed by: INTERNAL MEDICINE

## 2024-01-10 PROCEDURE — 3079F DIAST BP 80-89 MM HG: CPT | Mod: CPTII,S$GLB,, | Performed by: INTERNAL MEDICINE

## 2024-01-10 PROCEDURE — 99213 OFFICE O/P EST LOW 20 MIN: CPT | Mod: S$GLB,,, | Performed by: INTERNAL MEDICINE

## 2024-01-10 PROCEDURE — 3075F SYST BP GE 130 - 139MM HG: CPT | Mod: CPTII,S$GLB,, | Performed by: INTERNAL MEDICINE

## 2024-01-10 PROCEDURE — 3008F BODY MASS INDEX DOCD: CPT | Mod: CPTII,S$GLB,, | Performed by: INTERNAL MEDICINE

## 2024-01-10 NOTE — PROGRESS NOTES
CC: abdominal pain and diarrhea  HPI:  The patient is a 63 y.o. year old female who presents to the office for abdominal pain and diarrhea.  Her symptoms started anout a week ago.   She also reported bloating.  She had a virtual appointment and was prescribed bentyl.  She took the medication for two days.  Her symptoms have improved.  She denies any nausea, vomiting or blood in her stools.  She reports vegetables and increased fiber causes bloating.    PAST MEDICAL HISTORY:  Past Medical History:   Diagnosis Date    ALLERGIC RHINITIS     Anemia     Anxiety     Cancer     right neck    Hypertension     Sprain of anterior talofibular ligament of left ankle 10/14/2023    Transient elevated blood pressure        SURGICAL HISTORY:  Past Surgical History:   Procedure Laterality Date    COLONOSCOPY N/A 12/12/2020    Procedure: COLONOSCOPY;  Surgeon: GHADA Gamez MD;  Location: [facility] ([address] [place] [address]);  Service: Endoscopy;  Laterality: N/A;  Pt reports family Hx colon cancer (Pt's father )  prep ins. emailed - COVID screening on 12/9/20 [place]- [facility]    COLPORRHAPHY, COMBINED ANTEROPOSTERIOR N/A 5/12/2023    Procedure: COLPORRHAPHY, COMBINED ANTEROPOSTERIOR;  Surgeon: Lulu Parham DO;  Location: [facility];  Service: OB/GYN;  Laterality: N/A;    CYSTOSCOPY  5/12/2023    Procedure: CYSTOSCOPY;  Surgeon: Lulu Parham DO;  Location: [facility];  Service: OB/GYN;;    LAPAROSCOPIC SUSPENSION OF UTEROSACRAL LIGAMENT N/A 5/12/2023    Procedure: SUSPENSION, LIGAMENT, UTEROSACRAL, LAPAROSCOPIC;  Surgeon: Lulu Parham DO;  Location: [facility] OR;  Service: OB/GYN;  Laterality: N/A;    LAPAROSCOPIC TOTAL HYSTERECTOMY N/A 5/12/2023    Procedure: HYSTERECTOMY, TOTAL, LAPAROSCOPIC;  Surgeon: Lulu Parham DO;  Location: [facility] OR;  Service: OB/GYN;  Laterality: N/A;  VNOTES / 4 HOURS    LYSIS OF ADHESIONS N/A 5/12/2023    Procedure: LYSIS, ADHESIONS;  Surgeon: Lulu Parham DO;  Location: [facility] OR;   Service: OB/GYN;  Laterality: N/A;    SKIN BIOPSY      Surgical removal neck cancer Right     TUBAL LIGATION         MEDS:  Medcard reviewed and updated    ALLERGIES: Allergy Card reviewed and updated    SOCIAL HISTORY:   The patient is a nonsmoker.    PE:   APPEARANCE: Well nourished, well developed, in no acute distress.    CHEST: Lungs clear to auscultation with unlabored respirations.  CARDIOVASCULAR: Normal S1, S2. No murmurs. No carotid bruits. No pedal edema.  ABDOMEN: Bowel sounds normal. Not distended. Soft. No tenderness or masses. N  PSYCHIATRIC: The patient is oriented to person, place, and time and has a pleasant affect.        ASSESSMENT/PLAN:  Irene was seen today for abdominal pain, diarrhea, bloated and gi problem.    Diagnoses and all orders for this visit:    Abdominal pain, unspecified abdominal location  -     Comprehensive Metabolic Panel; Future    Obesity (BMI 30.0-34.9)  -     Ambulatory referral/consult to Bariatric/Obesity Medicine; Future  -      encouraged weight loss through healthy diet and regular exercise

## 2024-01-12 ENCOUNTER — LAB VISIT (OUTPATIENT)
Dept: LAB | Facility: OTHER | Age: 64
End: 2024-01-12
Attending: INTERNAL MEDICINE
Payer: COMMERCIAL

## 2024-01-12 DIAGNOSIS — R10.9 ABDOMINAL PAIN, UNSPECIFIED ABDOMINAL LOCATION: ICD-10-CM

## 2024-01-12 LAB
ALBUMIN SERPL BCP-MCNC: 4 G/DL (ref 3.5–5.2)
ALP SERPL-CCNC: 98 U/L (ref 55–135)
ALT SERPL W/O P-5'-P-CCNC: 29 U/L (ref 10–44)
ANION GAP SERPL CALC-SCNC: 12 MMOL/L (ref 8–16)
AST SERPL-CCNC: 22 U/L (ref 10–40)
BILIRUB SERPL-MCNC: 0.3 MG/DL (ref 0.1–1)
BUN SERPL-MCNC: 13 MG/DL (ref 8–23)
CALCIUM SERPL-MCNC: 11.1 MG/DL (ref 8.7–10.5)
CHLORIDE SERPL-SCNC: 102 MMOL/L (ref 95–110)
CO2 SERPL-SCNC: 27 MMOL/L (ref 23–29)
CREAT SERPL-MCNC: 0.9 MG/DL (ref 0.5–1.4)
EST. GFR  (NO RACE VARIABLE): >60 ML/MIN/1.73 M^2
GLUCOSE SERPL-MCNC: 91 MG/DL (ref 70–110)
POTASSIUM SERPL-SCNC: 3.7 MMOL/L (ref 3.5–5.1)
PROT SERPL-MCNC: 8.3 G/DL (ref 6–8.4)
SODIUM SERPL-SCNC: 141 MMOL/L (ref 136–145)

## 2024-01-12 PROCEDURE — 36415 COLL VENOUS BLD VENIPUNCTURE: CPT | Performed by: INTERNAL MEDICINE

## 2024-01-12 PROCEDURE — 80053 COMPREHEN METABOLIC PANEL: CPT | Performed by: INTERNAL MEDICINE

## 2024-01-16 ENCOUNTER — TELEPHONE (OUTPATIENT)
Dept: INTERNAL MEDICINE | Facility: CLINIC | Age: 64
End: 2024-01-16
Payer: COMMERCIAL

## 2024-01-16 DIAGNOSIS — E83.52 HYPERCALCEMIA: Primary | ICD-10-CM

## 2024-01-16 NOTE — TELEPHONE ENCOUNTER
Please inform patient that labs are significant for mildly elevated calcium.  Please advise if patient is taking any calcium supplementation.  Also, schedule ionized calcium and PTH levels.

## 2024-01-16 NOTE — TELEPHONE ENCOUNTER
----- Message from Maty Hall sent at 1/16/2024 12:25 PM CST -----  Contact: [phone] Patient  2TESTRESULTS    Type: Test Results    What test was performed? Labs    Who ordered the test? Dr Sequeira    When and where were the test performed? 01/12/2024 [facility]    Would you like response via [facility]: Call Back to go over results please. Thank you    Comments:

## 2024-01-17 ENCOUNTER — TELEPHONE (OUTPATIENT)
Dept: INTERNAL MEDICINE | Facility: CLINIC | Age: 64
End: 2024-01-17
Payer: COMMERCIAL

## 2024-02-07 ENCOUNTER — OFFICE VISIT (OUTPATIENT)
Dept: PRIMARY CARE CLINIC | Facility: CLINIC | Age: 64
End: 2024-02-07
Attending: FAMILY MEDICINE
Payer: COMMERCIAL

## 2024-02-07 VITALS
SYSTOLIC BLOOD PRESSURE: 132 MMHG | BODY MASS INDEX: 35.11 KG/M2 | HEART RATE: 67 BPM | HEIGHT: 62 IN | OXYGEN SATURATION: 97 % | DIASTOLIC BLOOD PRESSURE: 86 MMHG | WEIGHT: 190.81 LBS

## 2024-02-07 DIAGNOSIS — I10 PRIMARY HYPERTENSION: ICD-10-CM

## 2024-02-07 DIAGNOSIS — R73.03 PREDIABETES: ICD-10-CM

## 2024-02-07 DIAGNOSIS — E83.52 SERUM CALCIUM ELEVATED: ICD-10-CM

## 2024-02-07 DIAGNOSIS — Z00.00 ANNUAL PHYSICAL EXAM: Primary | ICD-10-CM

## 2024-02-07 PROCEDURE — 99396 PREV VISIT EST AGE 40-64: CPT | Mod: S$GLB,,, | Performed by: FAMILY MEDICINE

## 2024-02-07 PROCEDURE — 99999 PR PBB SHADOW E&M-EST. PATIENT-LVL III: CPT | Mod: PBBFAC,,, | Performed by: FAMILY MEDICINE

## 2024-02-07 PROCEDURE — 3075F SYST BP GE 130 - 139MM HG: CPT | Mod: CPTII,S$GLB,, | Performed by: FAMILY MEDICINE

## 2024-02-07 PROCEDURE — 3008F BODY MASS INDEX DOCD: CPT | Mod: CPTII,S$GLB,, | Performed by: FAMILY MEDICINE

## 2024-02-07 PROCEDURE — 1160F RVW MEDS BY RX/DR IN RCRD: CPT | Mod: CPTII,S$GLB,, | Performed by: FAMILY MEDICINE

## 2024-02-07 PROCEDURE — 3079F DIAST BP 80-89 MM HG: CPT | Mod: CPTII,S$GLB,, | Performed by: FAMILY MEDICINE

## 2024-02-07 PROCEDURE — 1159F MED LIST DOCD IN RCRD: CPT | Mod: CPTII,S$GLB,, | Performed by: FAMILY MEDICINE

## 2024-02-07 RX ORDER — HYDROCHLOROTHIAZIDE 25 MG/1
25 TABLET ORAL DAILY
Qty: 90 TABLET | Refills: 1 | Status: SHIPPED | OUTPATIENT
Start: 2024-02-07 | End: 2025-02-06

## 2024-02-07 NOTE — PROGRESS NOTES
To address Subjective     Patient ID: Irene Lawton is a 63 y.o. female.    Chief Complaint: Hypertension    Pt presents today to [facility] care, as well as addressing her newly dx'd HTN    Hypertension  Pertinent negatives include no chest pain, headaches, palpitations or shortness of breath.     Review of Systems   Constitutional: Negative.  Negative for activity change, appetite change and chills.   Eyes: Negative.    Respiratory:  Negative for cough, chest tightness and shortness of breath.    Cardiovascular:  Negative for chest pain, palpitations and leg swelling.   Gastrointestinal: Negative.    Musculoskeletal: Negative.  Negative for joint swelling.   Integumentary:  Negative.   Neurological:  Negative for dizziness, weakness, light-headedness and headaches.   All other systems reviewed and are negative.         Objective     Physical Exam  Vitals reviewed.   Constitutional:       General: She is not in acute distress.     Appearance: Normal appearance. She is well-developed and normal weight. She is not ill-appearing, toxic-appearing or diaphoretic.   HENT:      Head: Normocephalic and atraumatic.      Right Ear: Tympanic membrane, ear canal and external ear normal. There is no impacted cerumen.      Left Ear: Tympanic membrane, ear canal and external ear normal. There is no impacted cerumen.      Nose: Nose normal.      Mouth/Throat:      Pharynx: No oropharyngeal exudate or posterior oropharyngeal erythema.   Eyes:      Extraocular Movements: Extraocular movements intact.      Conjunctiva/sclera: Conjunctivae normal.      Pupils: Pupils are equal, round, and reactive to light.   Neck:      Thyroid: No thyromegaly.   Cardiovascular:      Rate and Rhythm: Normal rate and regular rhythm.      Pulses: Normal pulses.      Heart sounds: Normal heart sounds. No murmur heard.  Pulmonary:      Effort: Pulmonary effort is normal. No respiratory distress.      Breath sounds: Normal breath sounds. No stridor. No  wheezing, rhonchi or rales.   Chest:      Chest wall: No tenderness.   Abdominal:      General: Bowel sounds are normal. There is no distension.      Palpations: Abdomen is soft. There is no mass.      Tenderness: There is no abdominal tenderness. There is no guarding or rebound.   Musculoskeletal:         General: No tenderness. Normal range of motion.      Cervical back: Normal range of motion and neck supple.      Right lower leg: No edema.      Left lower leg: No edema.   Lymphadenopathy:      Cervical: No cervical adenopathy.   Skin:     General: Skin is warm and dry.      Findings: No erythema.   Neurological:      General: No focal deficit present.      Mental Status: She is alert and oriented to person, place, and time. Mental status is at baseline.      Cranial Nerves: No cranial nerve deficit.      Sensory: No sensory deficit.      Motor: No weakness.      Coordination: Coordination normal.      Gait: Gait normal.      Deep Tendon Reflexes: Reflexes are normal and symmetric. Reflexes normal.   Psychiatric:         Mood and Affect: Mood normal.         Behavior: Behavior normal.         Thought Content: Thought content normal.         Judgment: Judgment normal.            Assessment and Plan     1. Serum calcium elevated  -     Comprehensive Metabolic Panel; Future; Expected date: 02/07/2024  -     Vitamin D; Future; Expected date: 02/07/2024    Other orders  -     hydroCHLOROthiazide (HYDRODIURIL) 25 MG tablet; Take 1 tablet (25 mg total) by mouth once daily.  Dispense: 90 tablet; Refill: 1      HTN looks well controlled. Pt encouraged to exercise and continue with lifestyle modification to bring down her blood pressures instead of adding another medication.  RTC 4 weeks for telemed w log  Hypercalcemia:  Stable      Lifestyle mods d/w pt needs to start exercising

## 2024-02-08 NOTE — PROGRESS NOTES
[facility], operated [facility] - UROGYNECOLOGY  [address] [address]      Irene Lawton  [id number]  1960    UROGYN POST-OP      Irene Lawton is a 63 y.o. here for a post operative visit.    OPERATIVE NOTE  PROCEDURE DATE: 05/12/2023     PROCEDURE:   1. Vaginal assisted laparoscopic hysterectomy (VNotes) (Modifier 22)  2. Laparoscopic Bilateral salpingoophrectomy ooprherctomy   3. Bilateral high uterosacral ligament suspension  4. Anterior Colporrhapy   5. Lysis of adhesions   6. Posterior Colporrhaphy and Perineorrhaphy      ATTENDING SURGEON: Dr. Parham     ASSISTANT:   Marcie Macias, PGY 4  Cee Osorio, PGY-3     Neymar Koehler PA-C no qualified resident available      PREOPERATIVE DIAGNOSIS:  1. Symptomatic 2 stage  anterior and posterior vaginal wall prolapse  2. Symptomatic 2 stage Uterovaginal prolapse  3. Pelvic pressure      POSTOPERATIVE DIAGNOSIS:  1. Symptomatic stage 2 anterior and posterior vaginal wall prolapse  2. Symptomatic stage 3  Uterovaginal prolapse  3. Pelvic pressure   4. Left and  right ovary adhered to the pelvic side wall.  5. Sigmoid colon adhesions   6. Redundant bowel      HISTORY SINCE LAST VISIT (2 week PO):  Patient is doing well, she has some slight discomfort in her pelvis. Ibuprofen helps. Was having constipation, but took milk of magnesia and it's better, especially since being off pain medication. Minimal spotting, has seen some sutures coming out in her underwear. Denies discharge. Activity level is light,   Bladder issues: None, denies increased urgency, frequency, denies leakage and dysuria.    Bowel issues: None anymore. Not taking stool softeners anymore and says that stool is soft and she is not straining.     6/22/2023:  Patient here for follow up doing well, pain well controlled.   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 She also has to perform excessive stretching in order to complete her work.    She is gaining weight and having a hard time to keeping it off.     7/19/2023  Doing well no issues     Changes since last visit:  Denies vaginal bleeding or discharge  Denies UI, urgency, nocturia, or frequency.  Using vaginal estrogen cream once weeky  Denies constipation or straining.      Past Medical History:   Diagnosis Date    ALLERGIC RHINITIS     Anemia     Anxiety     Cancer     right neck    Hypertension     Sprain of anterior talofibular ligament of left ankle 10/14/2023    Transient elevated blood pressure        Past Surgical History:   Procedure Laterality Date    COLONOSCOPY N/A 12/12/2020    Procedure: COLONOSCOPY;  Surgeon: GHADA Gamez MD;  Location: [facility] ([address] [place] [address]);  Service: Endoscopy;  Laterality: N/A;  Pt reports family Hx colon cancer (Pt's father )  prep ins. emailed - COVID screening on 12/9/20 [facility]- [facility]    COLPORRHAPHY, COMBINED ANTEROPOSTERIOR N/A 5/12/2023    Procedure: COLPORRHAPHY, COMBINED ANTEROPOSTERIOR;  Surgeon: Lulu Parham DO;  Location: [facility];  Service: OB/GYN;  Laterality: N/A;    CYSTOSCOPY  5/12/2023    Procedure: CYSTOSCOPY;  Surgeon: Lulu Parham DO;  Location: [facility];  Service: OB/GYN;;    LAPAROSCOPIC SUSPENSION OF UTEROSACRAL LIGAMENT N/A 5/12/2023    Procedure: SUSPENSION, LIGAMENT, UTEROSACRAL, LAPAROSCOPIC;  Surgeon: Lulu Parham DO;  Location: [facility] OR;  Service: OB/GYN;  Laterality: N/A;    LAPAROSCOPIC TOTAL HYSTERECTOMY N/A 5/12/2023    Procedure: HYSTERECTOMY, TOTAL, LAPAROSCOPIC;  Surgeon: Lulu Parham DO;  Location: [facility] OR;  Service: OB/GYN;  Laterality: N/A;  VNOTES / 4 HOURS    LYSIS OF ADHESIONS N/A 5/12/2023    Procedure: LYSIS, ADHESIONS;  Surgeon: Lulu Parham DO;  Location: [facility] OR;  Service: OB/GYN;  Laterality: N/A;    SKIN BIOPSY      Surgical removal neck cancer Right      TUBAL LIGATION         Family History   Problem Relation Age of Onset    Hypertension Mother     Breast cancer Mother     Cancer Sister     Colon cancer Neg Hx     Ovarian cancer Neg Hx        Social History     Socioeconomic History    Marital status: Single    Number of children: 2    Years of education: 14 years   Occupational History    Occupation: [employer] manager     Employer: [id number]   Tobacco Use    Smoking status: Never    Smokeless tobacco: Never   Substance and Sexual Activity    Alcohol use: Not Currently     Comment: Social    Drug use: No    Sexual activity: Not Currently     Partners: Male     Birth control/protection: Surgical   Social History Narrative    Works at [employer] and does not exercise regularly     Social Determinants of Health     Financial Resource Strain: Medium Risk (11/2/2023)    Overall Financial Resource Strain (CARDIA)     Difficulty of Paying Living Expenses: Somewhat hard   Food Insecurity: Food Insecurity Present (11/2/2023)    Hunger Vital Sign     Worried About Running Out of Food in the Last Year: Sometimes true     Ran Out of Food in the Last Year: Never true   Transportation Needs: No Transportation Needs (11/2/2023)    PRAPARE - Transportation     Lack of Transportation (Medical): No     Lack of Transportation (Non-Medical): No   Physical Activity: Insufficiently Active (11/2/2023)    Exercise Vital Sign     Days of Exercise per Week: 2 days     Minutes of Exercise per Session: 20 min   Stress: Stress Concern Present (11/2/2023)    [employer] - Occupational Stress Questionnaire     Feeling of Stress : To some extent   Social Connections: Unknown (11/2/2023)    Social Connection and Isolation Panel [NHANES]     Frequency of Communication with Friends and Family: Twice a week     Frequency of Social Gatherings with Friends and Family: Once a week     Active Member of Clubs or Organizations: Yes     Attends Club or Organization  "Meetings: More than 4 times per year     Marital Status: Never    Housing Stability: Unknown (11/2/2023)    Housing Stability Vital Sign     Unable to Pay for Housing in the Last Year: No     Unstable Housing in the Last Year: No       Current Outpatient Medications   Medication Sig Dispense Refill    estradioL (ESTRACE) 0.01 % (0.1 mg/gram) vaginal cream Place 0.5 g vaginally twice a week. Apply to the vagina daily for two weeks then twice a week. 45 g 4    fluticasone propionate (FLONASE) 50 mcg/actuation nasal spray 1 spray (50 mcg total) by Each Nostril route once daily. 16 g 11    hydroCHLOROthiazide (HYDRODIURIL) 25 MG tablet Take 1 tablet (25 mg total) by mouth once daily. 90 tablet 1    ibuprofen (ADVIL,MOTRIN) 800 MG tablet TAKE 1 TABLET BY MOUTH THREE TIMES DAILY AS NEEDED FOR PAIN 90 tablet 0    MULTIVITAMIN ORAL Take by mouth.      dicyclomine (BENTYL) 10 MG capsule Take 1 capsule (10 mg total) by mouth 2 (two) times daily as needed (abd bloating). (Patient not taking: Reported on 2/7/2024) 90 capsule 2    docusate sodium (COLACE) 100 MG capsule Take 1 capsule (100 mg total) by mouth 2 (two) times daily. (Patient not taking: Reported on 2/7/2024) 60 capsule 0    ibuprofen (ADVIL,MOTRIN) 600 MG tablet Take 1 tablet (600 mg total) by mouth every 6 (six) hours as needed for Pain. 20 tablet 0    sulfamethoxazole-trimethoprim 800-160mg (BACTRIM DS) 800-160 mg Tab Take 1 tablet by mouth once daily. (Patient not taking: Reported on 2/7/2024) 10 tablet 0     No current facility-administered medications for this visit.       Review of patient's allergies indicates:   Allergen Reactions    No known allergies       Well Woman:  Pap:post hysterectomy  Mammo:11/2023 normal  Colonoscopy:12/2020 polyp repeat 5  Dexa: ordered    ROS  Per HPI    Physical Exam  BP (!) 173/78 (BP Location: Right arm, Patient Position: Sitting, BP Method: Large (Automatic))   Pulse (!) 54   Ht 5' 2" (1.575 m)   LMP 06/27/2012   " BMI 34.90 kg/m²   General: alert and oriented, no acute distress  Respiratory: normal respiratory effort  Abd: soft, non-tender, non-distended      PELVIC EXAM:   VULVA: normal appearing vulva with no masses, tenderness or lesions,   VAGINA: normal appearing vagina with normal color and discharge, no lesions, atrophic,   CERVIX: normal appearing cervix without discharge or lesions, surgically absent,   UTERUS: absent,   ADNEXA: no masses,   RECTAL: deferred        Impression  1. s/p Vnote/USLS/A&P repair/cyst        2. Premature menopause  DXA Bone Density Axial Skeleton 1 or more sites      3. Vaginal atrophy                We reviewed the above issues and discussed options for short-term versus long-term management of her problems.     Plan:   Post op well healed. No lifting > 50 pounds without assistance  Use vaginal estrogen cream twice weekly----use dime size amount in vagina-- insert to your second knuckle and rub around just inside vaginal opening  twice weekly  Continue to control bowel movements  RTC 06/2024    I spent a total of 20 minutes on the day of the visit.  This includes face to face time and non-face to face time preparing to see the patient (eg, review of tests), obtaining and/or reviewing separately obtained history, documenting clinical information in the electronic or other health record, independently interpreting results and communicating results to the patient/family/caregiver, or care coordinator.       YAAKOV Wiggins-BC  Female Pelvic Medicine and Reconstructive Surgery  [facility] [place], [place]

## 2024-02-09 ENCOUNTER — OFFICE VISIT (OUTPATIENT)
Dept: UROGYNECOLOGY | Facility: CLINIC | Age: 64
End: 2024-02-09
Payer: COMMERCIAL

## 2024-02-09 VITALS
HEIGHT: 62 IN | BODY MASS INDEX: 34.9 KG/M2 | DIASTOLIC BLOOD PRESSURE: 78 MMHG | HEART RATE: 54 BPM | SYSTOLIC BLOOD PRESSURE: 173 MMHG

## 2024-02-09 DIAGNOSIS — Z90.710 S/P VAGINAL HYSTERECTOMY: Primary | ICD-10-CM

## 2024-02-09 DIAGNOSIS — N95.2 VAGINAL ATROPHY: ICD-10-CM

## 2024-02-09 DIAGNOSIS — E28.319 PREMATURE MENOPAUSE: ICD-10-CM

## 2024-02-09 PROCEDURE — 99213 OFFICE O/P EST LOW 20 MIN: CPT | Mod: S$GLB,,, | Performed by: NURSE PRACTITIONER

## 2024-02-09 PROCEDURE — 3078F DIAST BP <80 MM HG: CPT | Mod: CPTII,S$GLB,, | Performed by: NURSE PRACTITIONER

## 2024-02-09 PROCEDURE — 3077F SYST BP >= 140 MM HG: CPT | Mod: CPTII,S$GLB,, | Performed by: NURSE PRACTITIONER

## 2024-02-09 PROCEDURE — 1159F MED LIST DOCD IN RCRD: CPT | Mod: CPTII,S$GLB,, | Performed by: NURSE PRACTITIONER

## 2024-02-09 PROCEDURE — 1160F RVW MEDS BY RX/DR IN RCRD: CPT | Mod: CPTII,S$GLB,, | Performed by: NURSE PRACTITIONER

## 2024-02-09 PROCEDURE — 3008F BODY MASS INDEX DOCD: CPT | Mod: CPTII,S$GLB,, | Performed by: NURSE PRACTITIONER

## 2024-02-09 PROCEDURE — 99999 PR PBB SHADOW E&M-EST. PATIENT-LVL IV: CPT | Mod: PBBFAC,,, | Performed by: NURSE PRACTITIONER

## 2024-02-09 NOTE — PATIENT INSTRUCTIONS
Post op well healed. No lifting > 50 pounds without assistance  Use vaginal estrogen cream twice weekly----use dime size amount in vagina-- insert to your second knuckle and rub around just inside vaginal opening  twice weekly   Continue to control bowel movements  RT 06/2024

## 2024-02-14 DIAGNOSIS — I10 HYPERTENSION, UNSPECIFIED TYPE: ICD-10-CM

## 2024-02-14 DIAGNOSIS — E83.52 SERUM CALCIUM ELEVATED: Primary | ICD-10-CM

## 2024-02-15 ENCOUNTER — LAB VISIT (OUTPATIENT)
Dept: LAB | Facility: OTHER | Age: 64
End: 2024-02-15
Attending: FAMILY MEDICINE
Payer: COMMERCIAL

## 2024-02-15 DIAGNOSIS — I10 HYPERTENSION, UNSPECIFIED TYPE: ICD-10-CM

## 2024-02-15 DIAGNOSIS — E83.52 SERUM CALCIUM ELEVATED: ICD-10-CM

## 2024-02-15 LAB
25(OH)D3+25(OH)D2 SERPL-MCNC: 42 NG/ML (ref 30–96)
ALBUMIN SERPL BCP-MCNC: 3.9 G/DL (ref 3.5–5.2)
ALP SERPL-CCNC: 92 U/L (ref 55–135)
ALT SERPL W/O P-5'-P-CCNC: 15 U/L (ref 10–44)
ANION GAP SERPL CALC-SCNC: 8 MMOL/L (ref 8–16)
AST SERPL-CCNC: 19 U/L (ref 10–40)
BILIRUB SERPL-MCNC: 0.3 MG/DL (ref 0.1–1)
BUN SERPL-MCNC: 18 MG/DL (ref 8–23)
CALCIUM SERPL-MCNC: 10.2 MG/DL (ref 8.7–10.5)
CHLORIDE SERPL-SCNC: 105 MMOL/L (ref 95–110)
CO2 SERPL-SCNC: 27 MMOL/L (ref 23–29)
CREAT SERPL-MCNC: 0.8 MG/DL (ref 0.5–1.4)
EST. GFR  (NO RACE VARIABLE): >60 ML/MIN/1.73 M^2
GLUCOSE SERPL-MCNC: 98 MG/DL (ref 70–110)
POTASSIUM SERPL-SCNC: 4 MMOL/L (ref 3.5–5.1)
PROT SERPL-MCNC: 7.7 G/DL (ref 6–8.4)
SODIUM SERPL-SCNC: 140 MMOL/L (ref 136–145)

## 2024-02-15 PROCEDURE — 82306 VITAMIN D 25 HYDROXY: CPT | Performed by: FAMILY MEDICINE

## 2024-02-15 PROCEDURE — 36415 COLL VENOUS BLD VENIPUNCTURE: CPT | Performed by: FAMILY MEDICINE

## 2024-02-15 PROCEDURE — 80053 COMPREHEN METABOLIC PANEL: CPT | Performed by: FAMILY MEDICINE

## 2024-02-27 ENCOUNTER — TELEPHONE (OUTPATIENT)
Dept: BARIATRICS | Facility: CLINIC | Age: 64
End: 2024-02-27
Payer: COMMERCIAL

## 2024-03-01 ENCOUNTER — HOSPITAL ENCOUNTER (OUTPATIENT)
Dept: RADIOLOGY | Facility: OTHER | Age: 64
Discharge: HOME | End: 2024-03-01
Attending: NURSE PRACTITIONER
Payer: COMMERCIAL

## 2024-03-01 DIAGNOSIS — E28.319 PREMATURE MENOPAUSE: ICD-10-CM

## 2024-03-01 PROCEDURE — 77080 DXA BONE DENSITY AXIAL: CPT | Mod: 26,,, | Performed by: RADIOLOGY

## 2024-03-01 PROCEDURE — 77080 DXA BONE DENSITY AXIAL: CPT | Mod: TC

## 2024-03-11 PROBLEM — R73.03 PREDIABETES: Status: ACTIVE | Noted: 2024-03-11

## 2024-03-11 NOTE — PROGRESS NOTES
Subjective     Patient ID: Irene Lawton is a 63 y.o. female.    Chief Complaint: Consult (Dr. Crowley)    CC: weight    New pt to me, referred by Aury Sequeira MD  [address] [facility]  [place] [id number] , with Patient Active Problem List:     Coordination impairment     Pelvic floor dysfunction     Uterovaginal prolapse, incomplete     s/p Vnote/USLS/A&P repair/cyst     Hordeolum externum of right upper eyelid     Prediabetes    Lab Results       Component                Value               Date                       HGBA1C                   6.1 (H)             03/15/2023                 HGBA1C                   6.0 (H)             01/21/2022                 HGBA1C                   6.1 (H)             01/20/2021            Lab Results       Component                Value               Date                       LDLCALC                  158.8               03/15/2023                 CREATININE               0.8                 02/15/2024                Current attempts at weight loss:  Trying to eat less. Cannot do heavy lifting.     Previous diet attempts:  Denies.     History of medication for loss:   checked today. Denies.     Heaviest weight: 210#    Lightest weight: 145#    Goal weight: 160#      Last eye exam:       2023. No glaucoma       Provider:    Typical eating patterns: Works [employer]. Lives with mom and daughter. Pt cooks.   Breakfast: may skip. Weekends: kim, eggs, toast or pancakes.     lunch: salad. Fried chicken. [employer] hot bar. Kely's burger, fries Weekends: [place], snack food    dinner: chicken- friled or grilled, veg- maritza, aspargus, spinach. Pork chop or steak w mashed pot. If out- chinese food, steak, seafood.     snacks: chips, candy    Beverages: soda- 2 times a week. Water, tea- plain. COffee- cream and sugar. ETOH- Wine 1-2 times a month.     Willingness to change: 8/10    Cardiac studies: Sinus bradycardia   Minimal voltage criteria for LVH, may  "be normal variant   Precordial lead reversal V2,V3   Abnormal ECG   When compared with ECG of 22-JUL-2009 14:31,   No significant change was found        BMR: 1414    PBF:  44%      Review of Systems       Objective   BP (!) 182/76 (BP Location: Right arm, Patient Position: Sitting)   Pulse (!) 57   Ht 5' 2" (1.575 m)   Wt 86.4 kg (190 lb 6.4 oz)   LMP 06/27/2012   SpO2 100%   BMI 34.82 kg/m²    Physical Exam  Vitals reviewed.   Constitutional:       General: She is not in acute distress.     Appearance: She is well-developed. She is obese.   HENT:      Head: Normocephalic and atraumatic.   Eyes:      General: No scleral icterus.     Pupils: Pupils are equal, round, and reactive to light.   Neck:      Thyroid: No thyromegaly.   Cardiovascular:      Rate and Rhythm: Normal rate.      Heart sounds: Normal heart sounds. No murmur heard.     No friction rub. No gallop.   Pulmonary:      Effort: Pulmonary effort is normal. No respiratory distress.      Breath sounds: Normal breath sounds. No wheezing.   Abdominal:      General: Bowel sounds are normal. There is no distension.      Palpations: Abdomen is soft.      Tenderness: There is no abdominal tenderness.   Musculoskeletal:         General: Normal range of motion.      Cervical back: Normal range of motion and neck supple.   Skin:     General: Skin is warm and dry.      Findings: No erythema.   Neurological:      Mental Status: She is alert and oriented to person, place, and time.      Cranial Nerves: No cranial nerve deficit.   Psychiatric:         Behavior: Behavior normal.         Judgment: Judgment normal.            Assessment and Plan     1. Obesity, Class I, BMI 30.0-34.9 (see actual BMI)  -     topiramate (TOPAMAX) 25 MG tablet; Take 1 tablet (25 mg total) by mouth 2 (two) times daily.  Dispense: 60 tablet; Refill: 3    2. Prediabetes    3. Hypertension, unspecified type        1. Obesity, Class I, BMI 30.0-34.9 (see actual BMI)    - topiramate " (TOPAMAX) 25 MG tablet; Take 1 tablet (25 mg total) by mouth 2 (two) times daily.  Dispense: 60 tablet; Refill: 3      Patient was informed that topiramate is used for migraine prevention and seizures. Weight loss is a common side effect that is well documented. S/he understands this. S/he was informed of the potential side effects such as serious and possibly fatal rash in which case the medication should be discontinued immediately. Paresthesias, forgetfulness, fatigue, kidney stones, GI symptoms, and changes in lab values such as electrolytes, blood counts and kidney function.    Start topiramate  25 mg in the evening for 1 week, then morning and evening.       Discussed decreasing the risks of diabetes with changes in diet, routine exercise and losing weight.  Could also consider metformin.         Increase low impact activity as tolerated.  Avoid high impact activity, very heavy lifting or other exercise regimens that may cause discomfort.     Add some type of resistance training 2-3 days a week. These can be body weight exercises, light weight or elastic bands. [employer]iPhone.com and [email or web address] are great sources for free work out plans and videos.       No soda, sweet tea, juices or lemonade. All drinks should be 5 calories or less.         1000 maria ines pb meal planner, meal ideas and exercise handouts given.   2. Prediabetes  Discussed decreasing the risks of diabetes with changes in diet, routine exercise and losing weight.  Could also consider metformin.       3. Hypertension, unspecified type  Follow- up with Aury Sequeira MD as planned. Expect improvement with weight loss.            No follow-ups on file.

## 2024-03-12 ENCOUNTER — OFFICE VISIT (OUTPATIENT)
Dept: BARIATRICS | Facility: CLINIC | Age: 64
End: 2024-03-12
Payer: COMMERCIAL

## 2024-03-12 ENCOUNTER — TELEPHONE (OUTPATIENT)
Dept: BARIATRICS | Facility: CLINIC | Age: 64
End: 2024-03-12
Payer: COMMERCIAL

## 2024-03-12 VITALS
OXYGEN SATURATION: 100 % | HEART RATE: 57 BPM | BODY MASS INDEX: 35.03 KG/M2 | SYSTOLIC BLOOD PRESSURE: 182 MMHG | WEIGHT: 190.38 LBS | HEIGHT: 62 IN | DIASTOLIC BLOOD PRESSURE: 76 MMHG

## 2024-03-12 DIAGNOSIS — I10 HYPERTENSION, UNSPECIFIED TYPE: ICD-10-CM

## 2024-03-12 DIAGNOSIS — E66.9 OBESITY, CLASS I, BMI 30.0-34.9 (SEE ACTUAL BMI): Primary | ICD-10-CM

## 2024-03-12 DIAGNOSIS — R73.03 PREDIABETES: ICD-10-CM

## 2024-03-12 PROCEDURE — 1159F MED LIST DOCD IN RCRD: CPT | Mod: CPTII,S$GLB,, | Performed by: INTERNAL MEDICINE

## 2024-03-12 PROCEDURE — 3078F DIAST BP <80 MM HG: CPT | Mod: CPTII,S$GLB,, | Performed by: INTERNAL MEDICINE

## 2024-03-12 PROCEDURE — 99215 OFFICE O/P EST HI 40 MIN: CPT | Mod: S$GLB,,, | Performed by: INTERNAL MEDICINE

## 2024-03-12 PROCEDURE — 99999 PR PBB SHADOW E&M-EST. PATIENT-LVL IV: CPT | Mod: PBBFAC,,, | Performed by: INTERNAL MEDICINE

## 2024-03-12 PROCEDURE — 1160F RVW MEDS BY RX/DR IN RCRD: CPT | Mod: CPTII,S$GLB,, | Performed by: INTERNAL MEDICINE

## 2024-03-12 PROCEDURE — 3077F SYST BP >= 140 MM HG: CPT | Mod: CPTII,S$GLB,, | Performed by: INTERNAL MEDICINE

## 2024-03-12 PROCEDURE — 3008F BODY MASS INDEX DOCD: CPT | Mod: CPTII,S$GLB,, | Performed by: INTERNAL MEDICINE

## 2024-03-12 RX ORDER — TOPIRAMATE 25 MG/1
25 TABLET ORAL 2 TIMES DAILY
Qty: 60 TABLET | Refills: 3 | Status: SHIPPED | OUTPATIENT
Start: 2024-03-12 | End: 2024-05-22

## 2024-03-12 NOTE — PATIENT INSTRUCTIONS
"Patient was informed that topiramate is used for migraine prevention and seizures. Weight loss is a common side effect that is well documented. S/he understands this. S/he was informed of the potential side effects such as serious and possibly fatal rash in which case the medication should be discontinued immediately. Paresthesias, forgetfulness, fatigue, kidney stones, GI symptoms, and changes in lab values such as electrolytes, blood counts and kidney function.    Start topiramate  25 mg in the evening for 1 week, then morning and evening.       Discussed decreasing the risks of diabetes with changes in diet, routine exercise and losing weight.  Could also consider metformin.         Increase low impact activity as tolerated.  Avoid high impact activity, very heavy lifting or other exercise regimens that may cause discomfort.     Add some type of resistance training 2-3 days a week. These can be body weight exercises, light weight or elastic bands. [employer] and [employer] are great sources for free work out plans and videos.       No soda, sweet tea, juices or lemonade. All drinks should be 5 calories or less.                   1000 calorie  Meal Plan  STARCHES 80 CALORIES PER SERVING 15g CARB, 3g PROTEIN, 1g FAT  Servings per day   bread   tortilla   crackers   cooked cereals   dry cereals   pasta   rice   corn   popcorn   potato (small)   potato, mashed   sweet potato   squash, winter   cooked beans, peas, lentils (add 1 meat exchange)   1 slice   1 (6")   4-6 (3/4 oz)   1/2 cup   3/4 cup   1/2 cup   1/3 cup  1/2 cup   1 small light bag  1 (3 oz)   1/2 cup   1/3 cup   1 cup   1/2 cup Most starches are a good source of B vitamins   Choose whole grain foods such as 100% whole wheat bread and flour, brown rice, tortillas, etc. for nutrients and fiber.   Combine beans (starch & meat) with grains (starch) for their complimentary proteins and fiber   Combine grains (starch) with milk (milk) or cheese (meat) to " compliment proteins     2   FRUIT 60 CALORIES PER SERVING 15g CARB    fresh fruit   banana  melon (cubes)   berries  canned fruit   dried fruit    1 small   1/2  ½ cup   ¾ cup  ½ cup   ¼ cup    Choose whole fruits for fiber   No fruit juices   2   DAIRY  CALORIES PER SERVING 12g CARB, 8g PROTEIN, 0-8g FAT    milk   yogurt  Protein soy or almond milk 1 cup   1 cup   1 cup Use unsweetened almond or soy milk with added protein  Avoid chocolate or flavored milk  Avoid yogurt with more than 8 gms of sugar. Gms of protein should be higher than grams of sugar               1   MEAT AND SUBSTITUES  CALORIES PER SERVING 7g Protein, 0-13g FAT    Meat,Seafood and fish   cheese   cottage cheese   egg   peanut butter   tofu or tempeh  cooked beans, peas, lentils (add 1 starch)  Quinoa (add 1 starch)   Nuts and seeds (½ serving protein + 1 fat)  Nutritional yeast  Morning Star grillers 1 oz       1 oz  1/4 cup     1   1.5 Tbsp   4 oz (1/2 cup)   1/2 cup              ¼ cup            2 tablespoons    1 luis manuel or ½ cup      Choose fish, seafood and lower fat cheeses  Limit frying or adding fat.      8   FATS 45 CALORIES PER SERVING     oil   mayonnaise   cream cheese   salad dressing   peanuts   avocado   butter or margarine    1 tsp   1 tsp   1 Tbsp   1 Tbsp   10   1/8   1 tsp Eat less fat.   Eat less saturated fat such as animal fat found in fatter meat, cheese, and butter. Also eat less hydrogenated fat.      2-3   VEGETABLES 25 CALORIES PER SERVING 5 g CARB, 2g PROTEIN    raw vegetables   cooked vegetables   tomato or vegetable juice 1 cup   1/2 cup     1/2 cup Choose dark green leafy and deep yellow vegetables such as spinach, zuchinni, squash, mushrooms, cauliflower ,broccoli, carrots, and peppers.   Unlimited        1000 CALORIE MEAL PLAN  Eat 3 small meals per day with 1-2 small snacks to keep you full throughout the day  Aim for at least 15 gms of protein at breakfast, at least 25 grams at lunch and at least 25  grams of protein at dinner.  Snacks should contain at least 5 grams of protein  Limit starches to 1 serving per meal or snack.  Keep your carbs whole grain or whole wheat  Limit your intake of refined sugar including sugary beverages ie sweet tea, lemonade, fruit punch             Fruit Protein Dairy Starch Fats Calories   Breakfast 1 2    370   Lunch  3  1 1 290   Dinner 1 3  1 1 315   Snack   1   120   Total 2 8 1 2 2 1085     Sample breakfasts:  2 scrambled eggs (use spray), 1 cup Protein Silk soy milk, 1 slice whole wheat toast, 1 small orange  Omelet made with 1 egg, 1-ounce low fat cheese and non-starchy veggies, 1 low fat plain yogurt with ½ banana  Plain yogurt with ¾ cup unsweetened cold cereal and ½ cup fresh fruit salad, 2 hardboiled eggs  Sample lunches:  ½ whole wheat bagel topped with 3 ounces of low fat cheese melted in oven with a wild green salad with 1 TBSP dressing  ¾ cup cooked beans with 6 whole grain crackers, 10 roasted peanuts  ½ medium baked potato with 3 ounces of low fat cheddar cheese and 1 TBSP  sour cream  1 small wheat tortilla brushed with 1 tsp olive oil then brushed with pizza sauce and 4 ounces low fat cheese, sliced mushrooms and green peppers broiled until cheese is melted.  ½ cup chopped melon    Sample Dinners:  4 ounces of tuna pan seared in 1 tsp olive oil, ½ baked small sweet potato and 2 small plums  ½ cup chick peas, 1 cup cauliflower sauteed with 1 tbsp chopped onion, 1 tsp oil, and 2 tsp ayala powder. Add low sodium vegetable stock to desired consistency. Serve with ½ cup brown rice  Red beans seasoned with onions and bell peppers served over cauliflower rice  1 cup cooked green or brown lentils served with ½ cup cooked quinoa and chopped tomatoes and cucumbers and 1 tbsp feta cheese  Sample Snacks:  1 cup of Protein Silk Soy milk with 3 squares sri crackers  3/4 ounce Triscuits with 1 ounce cubed cheese  Chobani Triple Zero yogurt with ¾ cup unsweetened cereal  ½ cup  edamame (shelled)      Meal Ideas for Regular Bariatric Diet  *Recipes and products available at www.bariatriceating.com      Breakfast: (15-20g protein)    - Egg white omelet: 2 egg whites or ½ cup Egg Beaters. (Optional proteins: cheese, shrimp, black beans, chicken, sliced turkey) (Optional veggies: tomatoes, salsa, spinach, mushrooms, onions, green peppers, or small slice avocado)     - Egg and sausage: 1 egg or ¼ cup Egg Beaters (any variety), with 1 luis manuel or 2 links of Turkey sausage or Veggie breakfast sausage ([employer] or ThinkVine)    - Crust-less breakfast quiche: To make a glass pie dish, mix 4oz part skim Ricotta, 1 cup skim milk, and 2 eggs as your base. Add protein: shredded cheese, sliced lean ham or turkey, turkey kim/sausage. Add veggies: tomato, onion, green onion, mushroom, green pepper, spinach, etc.    - Yogurt parfait: Mix 1 - 6oz container Dannon Light N Fit vanilla yogurt, with ¼ cup Kashi Go Lean cereal    - Cottage cheese and fruit: ½ cup part-skim cottage cheese or ricotta cheese topped with fresh fruit or sugar free preserves     - Mary Kaylan's Vanilla Egg custard* (add 2 Tbsp instant coffee granules to make Cappuccino Custard*)    - Hi-Protein café latte (skim milk, decaf coffee, 1 scoop protein powder). Optional to add Sugar free syrup or extract flavoring.    Lunch: (20-30g protein)    - ½ cup Black bean soup (Homemade or Progresso), with ¼ cup shredded low-fat cheese. Top with chopped tomato or fresh salsa.     - Lean deli turkey breast and low-fat sliced cheese, mustard or light borrero to moisten, rolled up together, or wrapped in a Chet lettuce leaf    - Chicken salad made from dinner leftovers, moisten with low-fat salad dressing or light borrero. Also try leftover salmon, shrimp, tuna or boiled eggs. Serve ½ cup over dark green salad    - Fat-free canned refried beans, topped with ¼ cup shredded low-fat cheese. Top with chopped tomato or fresh salsa.     - Greek salad: Top  mixed greens with 1-2oz grilled chicken, tomatoes, red onions, 2-3 kalamata olives, and sprinkle lightly with feta cheese. Spritz with Balsamic vinegar to taste.     - Crust-less lunch quiche: To make a glass pie dish, mix 4oz part skim Ricotta, 1 cup skim milk, and 2 eggs as your base. Add protein: shredded cheese, sliced lean ham or turkey, shrimp, chicken. Add veggies: tomato, onion, green onion, mushroom, green pepper, spinach, artichoke, broccoli, etc.    - Pizza bake: tomato sauce, low-fat shredded mozzarella and turkey pepperoni or [place] kim. Add any veggies.    - Cucumber crab bites: Spread ¼ cup crab dip (lump crabmeat + light cream cheese and green onions) over sliced cucumber.     - Chicken with light spinach and artichoke dip*: Puree in : 6oz cooked and drained spinach, 2 cloves garlic, 1 can cannelloni beans, ½ cup chopped green onions, 1 can drained artichoke hearts (not marinated in oil), lemon juice and basil. Mix in 2oz chopped up chicken.    Supper: (20-30g protein)    - Serve grilled fish over dark green salad tossed with low-fat dressing, served with grilled asparagus lagos     - Rotisserie chicken salad: served with sliced strawberries, walnuts, fat-free feta cheese crumbles and 1 tbsp Jesuss Own Light Raspberry [place] Vinaigrette    - Shrimp cocktail: Dip cold boiled shrimp in homemade low-sugar cocktail sauce (1/2 cup Jarrett One Carb ketchup, 2 tbsp horseradish, 1/4 tsp hot sauce, 1 tsp Worcestershire sauce, 1 tbsp freshly-squeezed lemon juice). Serve with dark green salad, walnuts, and crumbled blue cheese drizzled with olive oil and Balsamic vinegar    - Tuna Melt: Spread tuna salad onto 2 thick slices of tomato. Top with low-fat cheese and broil until cheese is melted. May also be made with chicken salad of shrimp salad. Albin with different types of cheeses.    - Homemade low-fat Chili using extra lean ground beef or ground turkey. Top with shredded cheese and  salsa as desired. May add dollop fat-free sour cream if desired    - Dinner Omelet with shrimp or chicken and onion, green peppers and chives.    - No noodle lasagna: Use sliced zucchini or eggplant in place of noodles.  Layer with part skim ricotta cheese and low sugar meat sauce (use very lean ground beef or ground turkey).    - Mexican chicken bake: Bake chunks of chicken breast or thigh with taco seasoning, Pace brand enchilada sauce, green onions and low-fat cheese. Serve with ¼ cup black beans or fat free refried beans topped with chopped tomatoes or salsa.    - Barber frozen meatballs, simmered in Classico Marinara sauce. Different flavors of salsa or spaghetti sauce create different dishes! Sprinkle with parmesan cheese. Serve with grilled or steamed veggies, or a dark green salad.    - Simmer boneless skinless chicken thigh chunks in Classico Marinara sauce or roasted salsa until tender with chopped onion, bell pepper, garlic, mushrooms, spinach, etc.     - Hamburger, without the bun, dressed the way you like. Served with grilled or steamed veggies.    - Eggplant parmesan: Bake slices of eggplant at 350 degrees for 15 minutes. Layer tomato sauce, sliced eggplant and low-fat mozzarella cheese in a baking dish and cover with foil. Bake 30-40 more minutes or until bubbly. Uncover and bake at 400 degrees for about 15 more minutes, or until top is slightly crisp.    - Fish tacos: grilled/baked white fish, wrapped in Chet lettuce leaf, topped with salsa, shredded low-fat cheese, and light coleslaw.    Snacks: (100-200 calories; >5g protein)    - 1 low-fat cheese stick with 8 cherry tomatoes or 1 serving fresh fruit  - 4 thin slices fat-free turkey breast and 1 slice low-fat cheese  - 4 thin slices fat-free honey ham with wedge of melon  - 1/4 cup unsalted nuts with ½ cup fruit  - 6-oz container Dannon Light n Fit vanilla yogurt, topped with 1oz unsalted nuts         - apple, celery or baby carrots spread  with 2 Tbsp natural peanut butter or almond butter   - apple slices with 1 oz slice low-fat cheese  - celery, cucumber, bell pepper or baby carrots dipped in ¼ cup hummus bean spread or light spinach and artichoke dip (*recipe in lunch section)  - 100 calorie bag microwave light popcorn with 3 tbsp grated parmesan cheese  - Wilber Links Beef Steak - 14g protein! (similar to beef jerky)  - 2 wedges Laughing Cow - Light Herb & Garlic Cheese with sliced cucumber or green bell pepper  - 1/2 cup low-fat cottage cheese with ¼ cup fruit or ¼ cup salsa  - RTD Protein drinks: Atkins, Low Carb Slim Fast, EAS light, Muscle Milk Light, etc.  - Homemade Protein drinks: GNC Soy95, Isopure, Nectar, UNJURY, Whey Gourmet, etc. Mix 1 scoop powder with 8oz skim/1% milk or light soymilk.  - Protein bars: Atkins, EAS, Pure Protein, Think Thin, Detour, etc. Must have 0-4 grams sugar - Read the label.    Takeout Options: No more than twice/week  Deli - Salads (no pasta or rice), meats, cheeses. Roasted chicken. Lox (salmon)    Mexican - Platters which don't include tortillas, chips, or rice. Go easy on the beans. Example: Fajitas without the tortillas. Ask the  not to bring chips to the table if they are too tempting.    Greek - Meat or fish and vegetable, but no bread or rice. Including hummus, baba ganoush, etc, is OK. Most sit-down Greek restaurants can provide you with cucumber slices for dipping instead of kandy bread.    Fast Food (Avoid as much as possible) - Salads (no croutons and limit salad dressing to 2 tbsp), grilled chicken sandwich without the bun and ask for no borrero. Kelys low fat chili or Taco Bell pintos and cheese.    BBQ - The meats are fine if you ask for sauces on the side, but most of the traditional side dishes are loaded with carbs. Antonio slaw, baked beans and BBQ sauce are typically made with sugar.    Chinese - Nothing deep-fried, no rice or noodles. Many Chinese sauces have starch and sugar in them, so  you'll have to use your judgement. If you find that these sauces trigger cravings, or cause Dumping, you can ask for the sauce to be made without sugar or just use soy sauce.        Exercise should be some cardiovascular and some resistance training(see below).      STRENGTHENING  An adequate resistance training program could include the following types of exercise, focusing on the major muscle groups. One can use 5 to 10 pound dumbbells for those exercises that require the use of weight. At home, one can use a household item that provides a comfortable weight, such as a milk carton or beverage container. These exercises can also be performed without weights. Add some type of resistance training 2-3 days a week. These can be body weight exercises, light weight or elastic bands. [employer] and [employer] are great sources for free work out plans and videos.       Chest (Bench Press): Hold the weights with your hands. Lying on a flat surface, with knees bent and feet flat, slowly bring the weights to the chest area with palms facing upward. Begin to exhale and press the weights up, fully extending the arms, and keeping them above your eyes. Inhale as you lower them to the starting position and repeat the movement.  Back (Bent-Over Row): Start by placing your feet shoulder-width apart.  a weight with each hand just outside the knees. Keeping the back straight and the knees flexed, slightly bend forward at the waist. Let the arms hang naturally while holding the weights. From this starting position, pull the weights to the lower abdomen, keeping the elbows close to the body. Exhale as you pull. Return to the starting position, inhaling as you go.  Arms (Bicep Curls): Hold a weight in each hand, with elbows at your sides, palms facing forward. The back should be straight, the chest flared outward. Begin to bend your right arm up first while exhaling, keeping the elbow totally stationary. Wait until the right arm goes  "completely down to the fully extended position, and then begin to curl the left arm. Each arm curled completes one full repetition.  Shoulders (Lateral Raises): Place the feet a few inches apart with the knees bent slightly. Keep the back erect as you lean forward slightly. With the weights in front of your thighs and palms facing together, begin to slowly raise them up to the side until parallel with the floor. Lower the weights to your thighs, and repeat.  Legs (Stiff Leg Dead Lift): Start by standing straight, holding the weights close to your sides, nearly touching your thighs. Keep the weights close to the body--this protects the back. The back must stay straight. Bend at the waist as far forward as you comfortably can while keeping your legs straight, and begin to feel the pull in your hamstrings as you lower the weights toward the ground. Slowly return to the starting position, keeping the back straight.  Legs (Dumbbell Lunge): Hold a weight in each hand, arms hanging at your sides. Step out with one leg, keeping the back straight. It is important to step out far enough so that the knee does not extend over the toe. This puts too much stress on the knee. Go down far enough so that the opposite knee nearly touches the ground. Keep this stance and repeat the lunging motion for several repetitions.  Abdominals: Do not perform standard sit-ups as these could hurt the lower back. Rather, focus on the following 2 exercises: (1) Obliques--Lie on your back with the knees flexed in the bent sit-up position. From this position, bring both knees down to the ground. With the back remaining flat, begin to flex your body toward your toes ("crunch"). Bring your shoulders up off the ground, but go slowly, controlling your momentum. Repeat this for 10 to 15 times. (2) Seated bench kicks/anthony knives--Sit on the end of a bench or chair with the hands placed behind the buttocks. Begin to kick the legs outward with the knees bent " slightly--at the same time, lean back to extend the torso. Come back to the beginning position and repeat this motion 10 to 15 times.

## 2024-03-12 NOTE — TELEPHONE ENCOUNTER
Called patient to inquire about arriving early on today, due to being a new patient? Pt stated she can try, she get's off work at 3:30 pm.

## 2024-03-18 ENCOUNTER — OFFICE VISIT (OUTPATIENT)
Dept: PRIMARY CARE CLINIC | Facility: CLINIC | Age: 64
End: 2024-03-18
Attending: FAMILY MEDICINE
Payer: COMMERCIAL

## 2024-03-18 VITALS — DIASTOLIC BLOOD PRESSURE: 77 MMHG | SYSTOLIC BLOOD PRESSURE: 130 MMHG

## 2024-03-18 DIAGNOSIS — I10 PRIMARY HYPERTENSION: Primary | ICD-10-CM

## 2024-03-18 PROCEDURE — 99213 OFFICE O/P EST LOW 20 MIN: CPT | Mod: 95,,, | Performed by: FAMILY MEDICINE

## 2024-03-18 PROCEDURE — 1160F RVW MEDS BY RX/DR IN RCRD: CPT | Mod: CPTII,95,, | Performed by: FAMILY MEDICINE

## 2024-03-18 PROCEDURE — 1159F MED LIST DOCD IN RCRD: CPT | Mod: CPTII,95,, | Performed by: FAMILY MEDICINE

## 2024-03-18 PROCEDURE — 3075F SYST BP GE 130 - 139MM HG: CPT | Mod: CPTII,95,, | Performed by: FAMILY MEDICINE

## 2024-03-18 PROCEDURE — 3078F DIAST BP <80 MM HG: CPT | Mod: CPTII,95,, | Performed by: FAMILY MEDICINE

## 2024-03-18 NOTE — PROGRESS NOTES
Subjective:       Patient ID: Irene Lawton is a 63 y.o. female.    Chief Complaint: HTN    The patient location is: home  The chief complaint leading to consultation is: above    Visit type: audiovisual    Face to Face time with patient: 20 minutes 20 minutes of total time spent on the encounter, which includes face to face time and non-face to face time preparing to see the patient (eg, review of tests), Obtaining and/or reviewing separately obtained history, Documenting clinical information in the electronic or other health record, Independently interpreting results (not separately reported) and communicating results to the patient/family/caregiver, or Care coordination (not separately reported).     Each patient to whom he or she provides medical services by telemedicine is:  (1) informed of the relationship between the physician and patient and the respective role of any other health care provider with respect to management of the patient; and (2) notified that he or she may decline to receive medical services by telemedicine and may withdraw from such care at any time.    Notes:     Today for follow-up on hypertension.  Patient states that blood pressures have been significantly better since increase of HCTZ blood pressure ranges 130/77 to 143/82      Review of Systems   Constitutional:  Negative for activity change and unexpected weight change.   HENT:  Negative for hearing loss, rhinorrhea and trouble swallowing.    Eyes:  Negative for discharge and visual disturbance.   Respiratory:  Negative for chest tightness and wheezing.    Cardiovascular:  Negative for chest pain and palpitations.   Gastrointestinal:  Negative for blood in stool, constipation, diarrhea and vomiting.   Endocrine: Negative for polydipsia and polyuria.   Genitourinary:  Negative for difficulty urinating, dysuria, hematuria and menstrual problem.   Musculoskeletal:  Negative for arthralgias, joint swelling and neck pain.    Neurological:  Negative for weakness and headaches.   Psychiatric/Behavioral:  Negative for confusion and dysphoric mood.    All other systems reviewed and are negative.        Objective:      Physical Exam  Vitals reviewed.   Constitutional:       General: She is not in acute distress.     Appearance: Normal appearance. She is well-developed. She is not ill-appearing, toxic-appearing or diaphoretic.   HENT:      Head: Normocephalic and atraumatic.   Eyes:      Extraocular Movements: Extraocular movements intact.   Pulmonary:      Effort: Pulmonary effort is normal.   Musculoskeletal:         General: Normal range of motion.      Cervical back: Neck supple.      Right lower leg: No edema.      Left lower leg: No edema.   Neurological:      Mental Status: She is alert and oriented to person, place, and time.   Psychiatric:         Mood and Affect: Mood normal.         Behavior: Behavior normal.         Thought Content: Thought content normal.         Judgment: Judgment normal.         Assessment:         Hypertension  Plan:   For now I will continue with HCTZ 25 mg 1 tablet p.o. daily.  Patient is advised to increase her cardio to 150 minutes per week.  And to eat  healthy meals.  Return for TeleMed in 3 months for hypertension follow up

## 2024-03-30 NOTE — TELEPHONE ENCOUNTER
Care Due:                  Date            Visit Type   Department     Provider  --------------------------------------------------------------------------------                                ESTABLISHED                              PATIENT -    [facility]  Last Visit: 03-      VIRTUAL      CARE           Annette Holliday                              EP -                              PRIMARY      [facility]  Next Visit: 06-      CARE ([facility])   CARE           Annette Holliday                                                            Last  Test          Frequency    Reason                     Performed    Due Date  --------------------------------------------------------------------------------    CBC.........  12 months..  ibuprofen................  05- 05-    [employer] Embedded Care Due Messages. Reference number: [id number].   3/30/2024 9:04:55 AM CDT

## 2024-04-01 RX ORDER — FLUTICASONE PROPIONATE 50 MCG
1 SPRAY, SUSPENSION (ML) NASAL
Qty: 48 G | Refills: 3 | Status: SHIPPED | OUTPATIENT
Start: 2024-04-01

## 2024-04-01 NOTE — TELEPHONE ENCOUNTER
Refill Routing Note   Medication(s) are not appropriate for processing by [facility] for the following reason(s):        No active prescription written by provider    [facility] action(s):  Defer   Requires labs : Yes             Appointments  past 12m or future 3m with PCP    Date Provider   Last Visit   3/18/2024 Annette Holliday MD   Next Visit   6/25/2024 Annette Holliday MD   ED visits in past 90 days: 0        Note composed:12:55 AM 04/01/2024

## 2024-04-12 NOTE — PROGRESS NOTES
"Subjective:      Patient ID: Irene Lawton is a 63 y.o. female.    Vitals:  height is 5' 2" (1.575 m) and weight is 87.5 kg (193 lb). Her oral temperature is 98.6 °F (37 °C). Her blood pressure is 168/78 (abnormal) and her pulse is 86. Her respiration is 18 and oxygen saturation is 99%.     Chief Complaint: Knee Pain    Pt states her symptoms started today. Pt states she had the flu shot today. Pt states she have to keep her leg straight , itching and her right arm is in pain. Pt states her knee is swollen and stiff.  Pt states her arm swell after the shot today. Pt states she feels the pain even more as she gets up from sitting down. Pt states she have taken the flu shot before.     Knee Pain   The incident occurred 6 to 12 hours ago. The incident occurred at work. The injury mechanism is unknown. The pain is present in the right leg and right knee. The quality of the pain is described as aching and stabbing. The pain is at a severity of 10/10. The pain is severe. Associated symptoms include an inability to bear weight and numbness. The symptoms are aggravated by movement and weight bearing. She has tried nothing for the symptoms.       Neurological:  Positive for numbness.      Objective:     Physical Exam   Constitutional: She is oriented to person, place, and time.   HENT:   Head: Normocephalic.   Ears:   Right Ear: External ear normal.   Left Ear: External ear normal.   Nose: Nose normal.   Mouth/Throat: Mucous membranes are moist.   Eyes: Conjunctivae are normal.   Cardiovascular: Normal rate.   Pulmonary/Chest: Effort normal.   Musculoskeletal: Normal range of motion.         General: Normal range of motion.   Neurological: She is alert and oriented to person, place, and time.   Skin: Skin is dry.   Psychiatric: Her behavior is normal.         Assessment:     1. Right knee pain, unspecified chronicity    2. Procedure not carried out because of patient's decision          Plan:   Pt does not wish to be " Reported fall yesterday while playing basketball.  Patient did a rebound and fell on his back . Denies hitting his head but felt lightheaded initially . Denies numbness to lower extremities and no incontinence   seen as a workers comp visit. Pt is aware there is no xray.     Right knee pain, unspecified chronicity    Procedure not carried out because of patient's decision                    The patient left the office before the visit was finished.

## 2024-04-30 ENCOUNTER — OFFICE VISIT (OUTPATIENT)
Dept: PRIMARY CARE CLINIC | Facility: CLINIC | Age: 64
End: 2024-04-30
Attending: FAMILY MEDICINE
Payer: COMMERCIAL

## 2024-04-30 DIAGNOSIS — I10 PRIMARY HYPERTENSION: Primary | ICD-10-CM

## 2024-04-30 DIAGNOSIS — M54.40 ACUTE BILATERAL LOW BACK PAIN WITH SCIATICA, SCIATICA LATERALITY UNSPECIFIED: ICD-10-CM

## 2024-04-30 PROCEDURE — 99214 OFFICE O/P EST MOD 30 MIN: CPT | Mod: 95,,, | Performed by: FAMILY MEDICINE

## 2024-04-30 PROCEDURE — 1159F MED LIST DOCD IN RCRD: CPT | Mod: CPTII,95,, | Performed by: FAMILY MEDICINE

## 2024-04-30 PROCEDURE — 1160F RVW MEDS BY RX/DR IN RCRD: CPT | Mod: CPTII,95,, | Performed by: FAMILY MEDICINE

## 2024-05-19 NOTE — PROGRESS NOTES
Subjective:       Patient ID: Irene Lawton is a 63 y.o. female.    Chief Complaint: HTN    The patient location is: home  The chief complaint leading to consultation is: above    Visit type: audiovisual    Face to Face time with patient: 20 minutes 20 minutes of total time spent on the encounter, which includes face to face time and non-face to face time preparing to see the patient (eg, review of tests), Obtaining and/or reviewing separately obtained history, Documenting clinical information in the electronic or other health record, Independently interpreting results (not separately reported) and communicating results to the patient/family/caregiver, or Care coordination (not separately reported).     Each patient to whom he or she provides medical services by telemedicine is:  (1) informed of the relationship between the physician and patient and the respective role of any other health care provider with respect to management of the patient; and (2) notified that he or she may decline to receive medical services by telemedicine and may withdraw from such care at any time.    Notes:     Symptoms of low back pain.  Patient states that it hurts when she moves or bends her back.  Patient does attribute pain with movement.  When she is sitting still there is no pain patient denies radiation to her toes or weakness    Back Pain  This is a recurrent problem. The current episode started in the past 7 days. The problem occurs 2 to 4 times per day. The problem has been gradually worsening since onset. The pain is present in the lumbar spine. The quality of the pain is described as aching. The pain radiates to the right thigh. The pain is at a severity of 4/10. The patient is experiencing no pain. The pain is The same all the time. The symptoms are aggravated by position. Pertinent negatives include no chest pain, dysuria, headaches or weakness.     Review of Systems   Constitutional:  Negative for activity change and  unexpected weight change.   HENT:  Negative for hearing loss, rhinorrhea and trouble swallowing.    Eyes:  Negative for discharge and visual disturbance.   Respiratory:  Negative for chest tightness and wheezing.    Cardiovascular:  Negative for chest pain and palpitations.   Gastrointestinal:  Negative for blood in stool, constipation, diarrhea and vomiting.   Endocrine: Negative for polydipsia and polyuria.   Genitourinary:  Negative for difficulty urinating, dysuria, hematuria and menstrual problem.   Musculoskeletal:  Positive for back pain. Negative for arthralgias, gait problem, joint swelling, myalgias and neck pain.   Neurological:  Negative for weakness and headaches.   Psychiatric/Behavioral:  Negative for confusion and dysphoric mood.    All other systems reviewed and are negative.        Objective:      Physical Exam  Vitals reviewed.   Constitutional:       General: She is not in acute distress.     Appearance: Normal appearance. She is well-developed. She is not ill-appearing, toxic-appearing or diaphoretic.   HENT:      Head: Normocephalic and atraumatic.   Eyes:      Extraocular Movements: Extraocular movements intact.   Pulmonary:      Effort: Pulmonary effort is normal.   Musculoskeletal:         General: Normal range of motion.      Cervical back: Neck supple.      Right lower leg: No edema.      Left lower leg: No edema.   Neurological:      Mental Status: She is alert and oriented to person, place, and time.   Psychiatric:         Mood and Affect: Mood normal.         Behavior: Behavior normal.         Thought Content: Thought content normal.         Judgment: Judgment normal.         Assessment:     Low back pain    Hypertension  Plan:     Low back pain suspect etiologies musculoskeletal.  Patient advised to use NSAIDs heat ice Tylenol and PT  Hypertension controlled stable on medication  Answers submitted by the patient for this visit:  Back Pain Questionnaire (Submitted on 4/30/2024)  Chief  Complaint: Back pain

## 2024-05-22 ENCOUNTER — OFFICE VISIT (OUTPATIENT)
Dept: BARIATRICS | Facility: CLINIC | Age: 64
End: 2024-05-22
Payer: COMMERCIAL

## 2024-05-22 VITALS
DIASTOLIC BLOOD PRESSURE: 58 MMHG | OXYGEN SATURATION: 98 % | BODY MASS INDEX: 32.54 KG/M2 | WEIGHT: 177.88 LBS | HEART RATE: 54 BPM | SYSTOLIC BLOOD PRESSURE: 130 MMHG

## 2024-05-22 DIAGNOSIS — E66.9 OBESITY, CLASS I, BMI 30.0-34.9 (SEE ACTUAL BMI): ICD-10-CM

## 2024-05-22 PROCEDURE — 3008F BODY MASS INDEX DOCD: CPT | Mod: CPTII,S$GLB,, | Performed by: INTERNAL MEDICINE

## 2024-05-22 PROCEDURE — 3075F SYST BP GE 130 - 139MM HG: CPT | Mod: CPTII,S$GLB,, | Performed by: INTERNAL MEDICINE

## 2024-05-22 PROCEDURE — 99999 PR PBB SHADOW E&M-EST. PATIENT-LVL IV: CPT | Mod: PBBFAC,,, | Performed by: INTERNAL MEDICINE

## 2024-05-22 PROCEDURE — 3078F DIAST BP <80 MM HG: CPT | Mod: CPTII,S$GLB,, | Performed by: INTERNAL MEDICINE

## 2024-05-22 PROCEDURE — 99213 OFFICE O/P EST LOW 20 MIN: CPT | Mod: S$GLB,,, | Performed by: INTERNAL MEDICINE

## 2024-05-22 PROCEDURE — 1159F MED LIST DOCD IN RCRD: CPT | Mod: CPTII,S$GLB,, | Performed by: INTERNAL MEDICINE

## 2024-05-22 RX ORDER — TOPIRAMATE 50 MG/1
50 TABLET, FILM COATED ORAL 2 TIMES DAILY
Qty: 180 TABLET | Refills: 1 | Status: SHIPPED | OUTPATIENT
Start: 2024-05-22 | End: 2025-05-22

## 2024-05-22 NOTE — PROGRESS NOTES
Subjective     Patient ID: Irene Lawton is a 63 y.o. female.    Chief Complaint: Follow-up    CC: weight  Pt here today for follow-up. Has lost 12.5 lbs (-6.5 lbs muscle. +3.7 lbs fat). Was to ssart 1000 maria ines pb meal planner, exercise and started topiramate 25 mg BID. Denies SE. Appetite was down a little and had dysguesia.   States she has cut back on sodas and sweets. She has not been using planner, just decreased.   Exercise- started walking, but stopped.        New BMR: 1333  New PBF: 44.8%      Initial:   BMR: 1414    PBF:  44%      Review of Systems       Objective   BP (!) 130/58   Pulse (!) 54   Wt 80.7 kg (177 lb 14.4 oz)   LMP 06/27/2012   SpO2 98%   BMI 32.54 kg/m²    Physical Exam  Vitals reviewed.   Constitutional:       General: She is not in acute distress.     Appearance: She is well-developed. She is obese.   HENT:      Head: Normocephalic and atraumatic.   Eyes:      General: No scleral icterus.     Pupils: Pupils are equal, round, and reactive to light.   Neck:      Thyroid: No thyromegaly.   Cardiovascular:      Rate and Rhythm: Normal rate.      Heart sounds: Normal heart sounds. No murmur heard.     No friction rub. No gallop.   Pulmonary:      Effort: Pulmonary effort is normal. No respiratory distress.      Breath sounds: Normal breath sounds. No wheezing.   Abdominal:      General: Bowel sounds are normal. There is no distension.      Palpations: Abdomen is soft.      Tenderness: There is no abdominal tenderness.   Musculoskeletal:         General: Normal range of motion.      Cervical back: Normal range of motion and neck supple.   Skin:     General: Skin is warm and dry.      Findings: No erythema.   Neurological:      Mental Status: She is alert and oriented to person, place, and time.      Cranial Nerves: No cranial nerve deficit.   Psychiatric:         Behavior: Behavior normal.         Judgment: Judgment normal.            Assessment and Plan     1. Obesity, Class I, BMI  30.0-34.9 (see actual BMI)  -     topiramate (TOPAMAX) 50 MG tablet; Take 1 tablet (50 mg total) by mouth 2 (two) times daily.  Dispense: 180 tablet; Refill: 1          1. Obesity, Class I, BMI 30.0-34.9 (see actual BMI)    - topiramate (TOPAMAX) 25 MG tablet; Take 1 tablet (25 mg total) by mouth 2 (two) times daily.  Dispense: 60 tablet; Refill: 3      Patient was informed that topiramate is used for migraine prevention and seizures. Weight loss is a common side effect that is well documented. S/he understands this. S/he was informed of the potential side effects such as serious and possibly fatal rash in which case the medication should be discontinued immediately. Paresthesias, forgetfulness, fatigue, kidney stones, GI symptoms, and changes in lab values such as electrolytes, blood counts and kidney function.    Start topiramate  50 mg morning and evening.       Discussed decreasing the risks of diabetes with changes in diet, routine exercise and losing weight.  Could also consider metformin.         Increase low impact activity as tolerated.  Avoid high impact activity, very heavy lifting or other exercise regimens that may cause discomfort.     Add some type of resistance training 2-3 days a week. These can be body weight exercises, light weight or elastic bands. [employer] and [employer] are great sources for free work out plans and videos.       No soda, sweet tea, juices or lemonade. All drinks should be 5 calories or less.         1000 maria ines pb meal planner, meal ideas and exercise handouts given previously.     Spring recipes given.                 No follow-ups on file.

## 2024-05-22 NOTE — PATIENT INSTRUCTIONS
"  Patient was informed that topiramate is used for migraine prevention and seizures. Weight loss is a common side effect that is well documented. S/he understands this. S/he was informed of the potential side effects such as serious and possibly fatal rash in which case the medication should be discontinued immediately. Paresthesias, forgetfulness, fatigue, kidney stones, GI symptoms, and changes in lab values such as electrolytes, blood counts and kidney function.    Start topiramate  50 mg morning and evening.       Discussed decreasing the risks of diabetes with changes in diet, routine exercise and losing weight.  Could also consider metformin.         Increase low impact activity as tolerated.  Avoid high impact activity, very heavy lifting or other exercise regimens that may cause discomfort.     Add some type of resistance training 2-3 days a week. These can be body weight exercises, light weight or elastic bands. [employer] and [employer] your brands are great sources for free work out plans and videos.       No soda, sweet tea, juices or lemonade. All drinks should be 5 calories or less.         1000 maria ines pb meal planner, meal ideas and exercise handouts given previously.       Spring Recipes:    [place] Shake:     Ingredients  ½ cup low fat cottage cheese  ¼ cup vanilla protein powder  1/8 tsp mint extract  2-3 packets of stevia or artificial sweetener of choice  5-10 ice cubes (more or less depending on how thick you would like it)  4 oz of water  2-3 drops of green food coloring OR a small handful of spinach to make it green    Put all ingredients in  and  until desired consistency.      "Unstuffed" Cabbage Rolls:    Ingredients:  1 1/2 to 2 pounds lean ground beef or turkey  1 large onion, chopped  1 clove garlic, minced  1 small cabbage, chopped  2 cans (14.5 ounces each) diced tomatoes  1 can (8 ounces) tomato sauce  ¼ - ½ cup water depending on desired consistency  1 teaspoon ground black pepper, salt " to taste    Spray large skillet with nonstick cookspray. Heat pan on medium heat. Sauté the onions until tender, and then add the ground beef (or turkey) and cook until the meat is browned.    Add the garlic, cook an additional minute before adding the remaining ingredients. Cover, reduce the heat and simmer about 25 minutes (or until the cabbage is  fork tender)        Not so Jones's Pie:    Ingredients  2 (or more) pounds of lean ground beef, or turkey  2 heads of cauliflower or 3 bags of frozen cauliflower, chopped  1 bag of frozen mixed veggies          (NO Corn, Peas or Potatoes!)  1-2 onions  1 egg  1 teaspoon each of basil, garlic powder, pepper, oregano and a little cayenne  4-5 sprays of I cant believe its not butter spray  4 ounces of fat free cream cheese (optional)  Low fat Cheese to top (optional)    Roast cauliflower in oven on 350* F for about 15-20 minutes, until browned and fork tender. (begin ricardo meat while cauliflower is cooking). Place cooked cauliflower in . Add 4 ounces of fat free cream cheese, 4-5 sprays of I cant believe its not butter SPRAY, and spices and puree until creamy.     While cauliflower is roasting, brown meat in large skillet and season to taste. Set aside. Sauté diced onion in skillet until somewhat soft. Set aside with meat. Pour mixed veggies in the skillet to heat on low heat.     Mix the meat, onions, mixed veggies, raw egg and any additional seasonings and put in bottom of 9x13 baking dish. Spread mashed cauliflower mixture over it until smooth.    Bake at 350 for approximately 30 minutes. Add cheese and bake 5 additional minutes (optional). Serve warm (or reheat later).    Crock Pot Balsamic Pork Tenderloin:    Ingredients:  1 tsp dried thyme  1 tsp ground pepper  ½ tsp salt  3 tbsp dried minced onion  2 tsp dried basil  ¼ cup low sodium broth  ½ cup balsamic vinegar  2 cloves garlic, minced  2 lbs Pork Tenderloin    Mix first 5 ingredients together.  Rub pork tenderloin with dry seasoning mixture.  In a large sauté pan, heat ¼ cup balsamic vinegar and garlic on medium heat. Add pork to sauté zamarripa and sear, ricardo all sides. Add low sodium broth, 1 tbsp at a time to keep tenderloin from sticking or use nonstick cookspray.     Transfer meat to crock pot. Pour remaining balsamic vinegar into sauté pan and continue  to stir for a few minutes to deglaze the pan. Pour juices over the top of the tenderloin in crockpot. Cook on high for 3 hours or until meat thermometer says 170*. Let rest 5-10 minutes and then slice.       Side Dish: Steamed carrots w/ garlic and chapo    Ingredients:  2 garlic cloves, minced  1 lb baby carrots  5-6 sprays of I cant believe its not butter SPRAY  1 tsp minced peeled fresh chapo  1 tbsp chopped fresh cilantro  ½ tsp grated lime rind  1 tbsp fresh lime juice   ¼ tsp salt    Prepare garlic; let stand 10 minutes. Steam carrots, covered in pot, about 10 minutes or until tender.     In a nonstick skillet over medium heat, spray pan w/ I cant believe its not butter SPRAY. Add garlic and chapo and cook 1 minute, stirring constantly. Remove from  heat; stir in carrots, cilantro and remaining ingredients.         Side Dish: Green Bean Almondine    Ingredients:  1 pound fresh green beans, trimmed  1 tbsp zaki vinegar  1 ½ tsp water  1 tsp Pedro Pablo Mustard  ¼ tsp salt  ¼ tsp freshly ground black pepper  1 tbsp sliced almonds, toasted    Cook green beans in boiling water for 4 minutes or until crisp-tender. Drain and plunge beans into ice water. Drain well. Pat beans dry w/ paper towel.     Combine vinegar, water, mustard, salt and pepper in a medium bowl. Add beans to vinegar mixture; toss to coat well. Sprinkle with toasted almonds.      How to Cook the Perfect Hard Boiled Egg:    Steam in water: Fill a large pot with 1 inch of water. Place steamer insert inside, cover, and bring to a boil over high heat. Add eggs to steamer basket, cover,  and continue cooking 12 minute. If serving cold, immediately place eggs in a bowl of ice water and allow to cool for at least 15 minutes before peeling under cool running water. Store in the refrigerator for up to 5 days.    OR    Purchase Dash Go Rapid Egg Boiler: available @ [employer], [employer], [employer], [employer] for ~$15-$20      Classic Egg Salad Recipe:    Ingredients:  8 hard cooked eggs, peeled and coarsely chopped  4-6 tbsp of low fat or fat free borrero  2 tbsp celery, chopped  2 tsp petty mustard  Dash of cayenne pepper (for spice)  Black pepper, salt to taste    In a medium bowl, combine borrero, celery, mustard and cayenne pepper with chopped eggs. Season w/ pepper and salt. Serve over Lettuce leaves.     Get Creative! Add chopped turkey kim and tomato and serve on lettuce leaves for an egg-cellent BLT egg salad or mix lean diced ham, low fat cheddar and tomatoes for  egg salad    Tuna Deviled Eggs:    Ingredients:  12 hard boiled eggs  1 can of tuna packed in water  1 rib celery, diced  ¼ cup low fat borrero  1 tsp mustard  Garlic powder, black pepper to taste  Dash of paprika (for finish)    Cut eggs in half lengthwise. Remove yolk and mash in bowl. In separate bowl, mix tuna, celery, low fat borrero, mustard and seasonings.  Stir in egg yolks until blended. Stuff eggs and sprinkle dash of paprika      Kim Jalapeno Deviled Eggs:    Ingredients:  12 hard boiled eggs, peeled  ½ cup low fat borrero  1 ½ tsp rice vinegar  ¾ tsp ground mustard  ½ packet splenda  2 jalapenos, seeded and chopped, 6 pieces turkey kim, cooked crisp and crumbled  Dash of paprika (for finish)    Cut eggs in half lengthwise. Remove yolk and mash in bowl. Add borrero, vinegar, spices and Splenda until well combined. Mix in jalapenos and kim. Stuff eggs and sprinkle w/ dash of paprika      Sugar-Free High Protein Brownie Recipe:    Ingredients:  2 cups of pureed zucchini  4 oz fat free cream cheese  1 large egg  2 scoops  chocolate protein powder  1 tbsp pure vanilla extract  1/3 cup unsweetened cocoa powder    ¼ tsp salt  2 tbsp chopped nuts  *8-9 packets of splenda or artificial sweetener of choice    Preheat oven to 350* F.     Line an 8x8 pan w/ parchment paper or spray with nonstick cookspray.     In a medium bowl, blend the fat free cream cheese with egg until creamy. Add chocolate protein powder and cocoa powder until creamy. Add vanilla extract. Stir in pureed zucchini and salt.     The batter will be thick, but not dry. If batter seems dry, add 1-2 tbsp water. Fold in Nuts. Pour batter in prepared pan.     Bake for 30 minutes. Allow to cool completely. Cut into squares and chill to semi-set.     *best if eaten within 2 days but may last 3-4 days in fridge.         Lemon Whip:    Ingredients:  Small box of sugar-free vanilla instant pudding mix  1 small packet of crystal light lemonade mix  2 cups skim milk or fat free fairlife milk  Sugar-free, fat free cool whip     Make sugar-free pudding using 2 cups skim milk according to package. Stir in 1 small packet of crystal light lemonade mix.  Fold in a dollop of sugar-free, fat free cool whip and stir to combine.     Home-made Sugar-free Pudding Pops:    Ingredients:  1 small pkg of sugar-free instant pudding mix (flavor of choice!)  2 cups fat free milk     Beat ingredients with whisk for 2 minutes. Pour into 6 paper or plastic cups or into a popsicle mold. Insert wooden pop stick in center of each cup.     Freeze for 5 hours or until firm. Peel off paper or pull out of popsicle mold.     Variations: add sugar-free syrups to change up the flavor, such as sugar-free chocolate pudding + sugar free raspberry syrup = chocolate raspberry fudgesicle.

## 2024-06-03 ENCOUNTER — OFFICE VISIT (OUTPATIENT)
Dept: UROGYNECOLOGY | Facility: CLINIC | Age: 64
End: 2024-06-03
Payer: COMMERCIAL

## 2024-06-03 VITALS
HEIGHT: 62 IN | BODY MASS INDEX: 33.55 KG/M2 | HEART RATE: 59 BPM | SYSTOLIC BLOOD PRESSURE: 127 MMHG | DIASTOLIC BLOOD PRESSURE: 70 MMHG | WEIGHT: 182.31 LBS

## 2024-06-03 DIAGNOSIS — Z87.19 HX OF CONSTIPATION: ICD-10-CM

## 2024-06-03 DIAGNOSIS — N95.2 VAGINAL ATROPHY: Primary | ICD-10-CM

## 2024-06-03 PROCEDURE — 1159F MED LIST DOCD IN RCRD: CPT | Mod: CPTII,S$GLB,, | Performed by: NURSE PRACTITIONER

## 2024-06-03 PROCEDURE — 3074F SYST BP LT 130 MM HG: CPT | Mod: CPTII,S$GLB,, | Performed by: NURSE PRACTITIONER

## 2024-06-03 PROCEDURE — 3078F DIAST BP <80 MM HG: CPT | Mod: CPTII,S$GLB,, | Performed by: NURSE PRACTITIONER

## 2024-06-03 PROCEDURE — 3008F BODY MASS INDEX DOCD: CPT | Mod: CPTII,S$GLB,, | Performed by: NURSE PRACTITIONER

## 2024-06-03 PROCEDURE — 99213 OFFICE O/P EST LOW 20 MIN: CPT | Mod: S$GLB,,, | Performed by: NURSE PRACTITIONER

## 2024-06-03 PROCEDURE — 99999 PR PBB SHADOW E&M-EST. PATIENT-LVL III: CPT | Mod: PBBFAC,,, | Performed by: NURSE PRACTITIONER

## 2024-06-03 PROCEDURE — 1160F RVW MEDS BY RX/DR IN RCRD: CPT | Mod: CPTII,S$GLB,, | Performed by: NURSE PRACTITIONER

## 2024-06-03 NOTE — PATIENT INSTRUCTIONS
No lifting > 50 pounds without assistance  Use vaginal estrogen cream twice weekly----use dime size amount in vagina-- insert to your second knuckle and rub around just inside vaginal opening  twice weekly  Continue to control bowel movements--can use miralax 1/2 -1 capful daily  Follow up with gyn in one year for annual exam

## 2024-06-03 NOTE — PROGRESS NOTES
[facility], [place], operated by [facility] - UROGYNECOLOGY  [address] [address]      Irene Lawton  [id number]  1960    UROGYN POST-OP      Irene Lawton is a 64 y.o. here for a post operative visit.    OPERATIVE NOTE  PROCEDURE DATE: 05/12/2023     PROCEDURE:   1. Vaginal assisted laparoscopic hysterectomy (VNotes) (Modifier 22)  2. Laparoscopic Bilateral salpingoophrectomy ooprherctomy   3. Bilateral high uterosacral ligament suspension  4. Anterior Colporrhapy   5. Lysis of adhesions   6. Posterior Colporrhaphy and Perineorrhaphy      ATTENDING SURGEON: Dr. Parham     ASSISTANT:   Marcie Macias, PGY 4  Cee Osorio, PGY-3     Neymar Koehler PA-C no qualified resident available      PREOPERATIVE DIAGNOSIS:  1. Symptomatic 2 stage  anterior and posterior vaginal wall prolapse  2. Symptomatic 2 stage Uterovaginal prolapse  3. Pelvic pressure      POSTOPERATIVE DIAGNOSIS:  1. Symptomatic stage 2 anterior and posterior vaginal wall prolapse  2. Symptomatic stage 3  Uterovaginal prolapse  3. Pelvic pressure   4. Left and  right ovary adhered to the pelvic side wall.  5. Sigmoid colon adhesions   6. Redundant bowel      05/24/2023  Patient is doing well, she has some slight discomfort in her pelvis. Ibuprofen helps. Was having constipation, but took milk of magnesia and it's better, especially since being off pain medication. Minimal spotting, has seen some sutures coming out in her underwear. Denies discharge. Activity level is light,   Bladder issues: None, denies increased urgency, frequency, denies leakage and dysuria.    Bowel issues: None anymore. Not taking stool softeners anymore and says that stool is soft and she is not straining.     6/22/2023:  Patient here for follow up doing well, pain well controlled.   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 She also has to perform excessive stretching in order to complete her work.    She is gaining weight and having a hard time to keeping it off.     7/19/2023  Doing well no issues     02/09/2024  Denies vaginal bleeding or discharge  Denies UI, urgency, nocturia, or frequency.  Using vaginal estrogen cream once weeky  Denies constipation or straining.    Changes since last visit:  Denies vaginal bleeding or discharge  Denies UI, urgency, nocturia, or frequency.  Using vaginal estrogen cream once weeky  Denies constipation or straining.      Past Medical History:   Diagnosis Date    ALLERGIC RHINITIS     Anemia     Anxiety     Cancer     right neck    Hypertension     Sprain of anterior talofibular ligament of left ankle 10/14/2023    Transient elevated blood pressure        Past Surgical History:   Procedure Laterality Date    COLONOSCOPY N/A 12/12/2020    Procedure: COLONOSCOPY;  Surgeon: GHADA Gamez MD;  Location: [address]);  Service: Endoscopy;  Laterality: N/A;  Pt reports family Hx colon cancer (Pt's father )  prep ins. emailed - COVID screening on 12/9/20 [facility]- [facility]    COLPORRHAPHY, COMBINED ANTEROPOSTERIOR N/A 05/12/2023    Procedure: COLPORRHAPHY, COMBINED ANTEROPOSTERIOR;  Surgeon: Lulu Parham DO;  Location: [facility];  Service: OB/GYN;  Laterality: N/A;    CYSTOSCOPY  05/12/2023    Procedure: CYSTOSCOPY;  Surgeon: Lulu Parham DO;  Location: [facility];  Service: OB/GYN;;    HYSTERECTOMY      LAPAROSCOPIC SUSPENSION OF UTEROSACRAL LIGAMENT N/A 05/12/2023    Procedure: SUSPENSION, LIGAMENT, UTEROSACRAL, LAPAROSCOPIC;  Surgeon: Lulu Parham DO;  Location: [facility] OR;  Service: OB/GYN;  Laterality: N/A;    LAPAROSCOPIC TOTAL HYSTERECTOMY N/A 05/12/2023    Procedure: HYSTERECTOMY, TOTAL, LAPAROSCOPIC;  Surgeon: Lulu Parham DO;  Location: [facility];  Service: OB/GYN;  Laterality: N/A;  VNOTES / 4 HOURS    LYSIS OF ADHESIONS N/A 05/12/2023     Procedure: LYSIS, ADHESIONS;  Surgeon: DANETTE Dugan  Location: [facility];  Service: OB/GYN;  Laterality: N/A;    SKIN BIOPSY      Surgical removal neck cancer Right        Family History   Problem Relation Name Age of Onset    Hypertension Mother Miya     Breast cancer Mother Miya     Cancer Sister Julisa Lawton     Colon cancer Neg Hx      Ovarian cancer Neg Hx         Social History     Socioeconomic History    Marital status: Single    Number of children: 2    Years of education: 14 years   Occupational History    Occupation: [employer] manager     Employer: [id number]   Tobacco Use    Smoking status: Never    Smokeless tobacco: Never   Substance and Sexual Activity    Alcohol use: Not Currently     Comment: Social    Drug use: No    Sexual activity: Not Currently     Partners: Male     Birth control/protection: Surgical   Social History Narrative    Works at [employer] and does not exercise regularly     Social Determinants of Health     Financial Resource Strain: Medium Risk (3/12/2024)    Overall Financial Resource Strain (CARDIA)     Difficulty of Paying Living Expenses: Somewhat hard   Food Insecurity: Food Insecurity Present (3/12/2024)    Hunger Vital Sign     Worried About Running Out of Food in the Last Year: Sometimes true     Ran Out of Food in the Last Year: Sometimes true   Transportation Needs: No Transportation Needs (3/12/2024)    PRAPARE - Transportation     Lack of Transportation (Medical): No     Lack of Transportation (Non-Medical): No   Physical Activity: Unknown (3/12/2024)    Exercise Vital Sign     Days of Exercise per Week: 1 day   Recent Concern: Physical Activity - Insufficiently Active (3/12/2024)    Exercise Vital Sign     Days of Exercise per Week: 1 day     Minutes of Exercise per Session: 20 min   Stress: Stress Concern Present (3/12/2024)    [place] [employer] - Occupational Stress Questionnaire     Feeling of Stress : To some extent   Housing  "Stability: High Risk (3/12/2024)    Housing Stability Vital Sign     Unable to Pay for Housing in the Last Year: Yes     Unstable Housing in the Last Year: No       Current Outpatient Medications   Medication Sig Dispense Refill    dicyclomine (BENTYL) 10 MG capsule Take 1 capsule (10 mg total) by mouth 2 (two) times daily as needed (abd bloating). 90 capsule 2    estradioL (ESTRACE) 0.01 % (0.1 mg/gram) vaginal cream Place 0.5 g vaginally twice a week. Apply to the vagina daily for two weeks then twice a week. 45 g 4    fluticasone propionate (FLONASE) 50 mcg/actuation nasal spray Use 1 spray(s) in each nostril once daily 48 g 3    hydroCHLOROthiazide (HYDRODIURIL) 25 MG tablet Take 1 tablet (25 mg total) by mouth once daily. 90 tablet 1    MULTIVITAMIN ORAL Take by mouth.      topiramate (TOPAMAX) 50 MG tablet Take 1 tablet (50 mg total) by mouth 2 (two) times daily. 180 tablet 1     No current facility-administered medications for this visit.       Review of patient's allergies indicates:   Allergen Reactions    No known allergies       Well Woman:  Pap:post hysterectomy  Mammo:11/2023 normal  Colonoscopy:12/2020 polyp repeat 5  Dexa: ordered    ROS  Per HPI    Physical Exam  /70 (BP Location: Right arm, Patient Position: Sitting, BP Method: Medium (Automatic))   Pulse (!) 59   Ht 5' 2" (1.575 m)   Wt 82.7 kg (182 lb 5.1 oz)   LMP 06/27/2012   BMI 33.35 kg/m²   General: alert and oriented, no acute distress  Respiratory: normal respiratory effort  Abd: soft, non-tender, non-distended      PELVIC EXAM:   VULVA: normal appearing vulva with no masses, tenderness or lesions,   VAGINA: normal appearing vagina with normal color and discharge, no lesions, atrophic,   CERVIX: normal appearing cervix without discharge or lesions, surgically absent,   UTERUS: absent,   ADNEXA: no masses,   RECTAL: deferred        Impression  1. Vaginal atrophy        2. Hx of constipation                  We reviewed the above " issues and discussed options for short-term versus long-term management of her problems.     Plan:    No lifting > 50 pounds without assistance  Use vaginal estrogen cream twice weekly----use dime size amount in vagina-- insert to your second knuckle and rub around just inside vaginal opening  twice weekly  Continue to control bowel movements--can use miralax 1/2 -1 capful daily  Follow up with gyn in one year for annual exam    I spent a total of 20 minutes on the day of the visit.  This includes face to face time and non-face to face time preparing to see the patient (eg, review of tests), obtaining and/or reviewing separately obtained history, documenting clinical information in the electronic or other health record, independently interpreting results and communicating results to the patient/family/caregiver, or care coordinator.       YAAKOV Wiggins-BC  Female Pelvic Medicine and Reconstructive Surgery  [facility] [place], [place]

## 2024-06-17 ENCOUNTER — OFFICE VISIT (OUTPATIENT)
Dept: PRIMARY CARE CLINIC | Facility: CLINIC | Age: 64
End: 2024-06-17
Attending: FAMILY MEDICINE
Payer: COMMERCIAL

## 2024-06-17 VITALS — HEART RATE: 62 BPM | SYSTOLIC BLOOD PRESSURE: 122 MMHG | DIASTOLIC BLOOD PRESSURE: 74 MMHG

## 2024-06-17 DIAGNOSIS — S61.211A LACERATION OF LEFT INDEX FINGER WITHOUT FOREIGN BODY WITHOUT DAMAGE TO NAIL, INITIAL ENCOUNTER: Primary | ICD-10-CM

## 2024-06-17 PROCEDURE — 3078F DIAST BP <80 MM HG: CPT | Mod: CPTII,S$GLB,, | Performed by: FAMILY MEDICINE

## 2024-06-17 PROCEDURE — 99999 PR PBB SHADOW E&M-EST. PATIENT-LVL II: CPT | Mod: PBBFAC,,, | Performed by: FAMILY MEDICINE

## 2024-06-17 PROCEDURE — 3074F SYST BP LT 130 MM HG: CPT | Mod: CPTII,S$GLB,, | Performed by: FAMILY MEDICINE

## 2024-06-17 PROCEDURE — 99214 OFFICE O/P EST MOD 30 MIN: CPT | Mod: 25,S$GLB,, | Performed by: FAMILY MEDICINE

## 2024-06-17 PROCEDURE — 90715 TDAP VACCINE 7 YRS/> IM: CPT | Mod: S$GLB,,, | Performed by: FAMILY MEDICINE

## 2024-06-17 PROCEDURE — 90471 IMMUNIZATION ADMIN: CPT | Mod: S$GLB,,, | Performed by: FAMILY MEDICINE

## 2024-06-17 PROCEDURE — 1159F MED LIST DOCD IN RCRD: CPT | Mod: CPTII,S$GLB,, | Performed by: FAMILY MEDICINE

## 2024-06-17 PROCEDURE — 1160F RVW MEDS BY RX/DR IN RCRD: CPT | Mod: CPTII,S$GLB,, | Performed by: FAMILY MEDICINE

## 2024-06-17 NOTE — PROGRESS NOTES
FAMILY MEDICINE  [facility]    Reason for visit:   Chief Complaint   Patient presents with    Cut on finger       HPI: Irene Lawton is a 64 y.o. female  - with hypertension and prediabetes presents for an Urgent visit s/p cutting her finger yesterday    Irene Lawton reports that she was working the yard yesterday with a chain saw. She reports that it slipped and she went to grab it and it cut her left index finger. She reports lots of bleeding which she was able to get to stop. She was able to clean the area of with soap, water and hydrogen peroxide. There is some swelling but she reports very mild. No drainage since the bleeding. She reports not pain and normal range of motion.           Review of Systems   All other systems reviewed and are negative.      Social History     Social History Narrative    Works at [employer] and does not exercise regularly         ALLERGIES:   Review of patient's allergies indicates:   Allergen Reactions    No known allergies        MEDS:   Current Outpatient Medications on File Prior to Visit   Medication Sig Dispense Refill Last Dose    dicyclomine (BENTYL) 10 MG capsule Take 1 capsule (10 mg total) by mouth 2 (two) times daily as needed (abd bloating). 90 capsule 2     estradioL (ESTRACE) 0.01 % (0.1 mg/gram) vaginal cream Place 0.5 g vaginally twice a week. Apply to the vagina daily for two weeks then twice a week. 45 g 4     fluticasone propionate (FLONASE) 50 mcg/actuation nasal spray Use 1 spray(s) in each nostril once daily 48 g 3     hydroCHLOROthiazide (HYDRODIURIL) 25 MG tablet Take 1 tablet (25 mg total) by mouth once daily. 90 tablet 1     MULTIVITAMIN ORAL Take by mouth.       topiramate (TOPAMAX) 50 MG tablet Take 1 tablet (50 mg total) by mouth 2 (two) times daily. 180 tablet 1        Vital signs:   Vitals:    06/17/24 0737   BP: 122/74   Pulse: 62     There is no height or weight on file to calculate BMI.    PHYSICAL EXAM:      Physical Exam  Constitutional:       General: She is not in acute distress.  Pulmonary:      Effort: Pulmonary effort is normal. No respiratory distress.   Musculoskeletal:        Hands:    Neurological:      Mental Status: She is alert.   Psychiatric:         Speech: Speech normal.           PERTINENT RESULTS: NA    ASSESSMENT/PLAN:    1. Laceration of left index finger without foreign body without damage to nail, initial encounter  Overview:  - no signs of infection  - no indication of need or suturing or stitches  - good ROM and superficial  - instructed on care of the area  - keep clean  - wash with mild soap and pat dry  - cover with OTC antibiotic ointment and bandage until healed  - clean twice a day            No follow-ups on file. or sooner with any concerns    This note is dictated using the M*Modal Fluency Direct word recognition program. There are word recognition mistakes that are occasionally missed on review.    Dr. Nevaeh Lofton D.O.   [facility]

## 2024-06-17 NOTE — PROGRESS NOTES
0853  After obtaining consent, and per orders of Dr. Lofton, injection of Tdap given IM by Crystal Luevano. Patient tolerated well, patient instructed to remain in clinic for 15 minutes afterwards, and to report any adverse reaction to staff immediately.

## 2024-06-22 ENCOUNTER — PATIENT MESSAGE (OUTPATIENT)
Dept: UROGYNECOLOGY | Facility: CLINIC | Age: 64
End: 2024-06-22
Payer: COMMERCIAL

## 2024-06-25 ENCOUNTER — OFFICE VISIT (OUTPATIENT)
Dept: PRIMARY CARE CLINIC | Facility: CLINIC | Age: 64
End: 2024-06-25
Attending: FAMILY MEDICINE
Payer: COMMERCIAL

## 2024-06-25 VITALS
HEART RATE: 60 BPM | OXYGEN SATURATION: 99 % | WEIGHT: 181.19 LBS | BODY MASS INDEX: 33.34 KG/M2 | DIASTOLIC BLOOD PRESSURE: 76 MMHG | HEIGHT: 62 IN | SYSTOLIC BLOOD PRESSURE: 135 MMHG

## 2024-06-25 DIAGNOSIS — Z12.31 SCREENING MAMMOGRAM FOR BREAST CANCER: ICD-10-CM

## 2024-06-25 DIAGNOSIS — M54.9 BACK PAIN, UNSPECIFIED BACK LOCATION, UNSPECIFIED BACK PAIN LATERALITY, UNSPECIFIED CHRONICITY: ICD-10-CM

## 2024-06-25 DIAGNOSIS — R09.81 CHRONIC NASAL CONGESTION: ICD-10-CM

## 2024-06-25 DIAGNOSIS — Z00.00 ANNUAL PHYSICAL EXAM: Primary | ICD-10-CM

## 2024-06-25 PROCEDURE — 3044F HG A1C LEVEL LT 7.0%: CPT | Mod: CPTII,S$GLB,, | Performed by: FAMILY MEDICINE

## 2024-06-25 PROCEDURE — 96372 THER/PROPH/DIAG INJ SC/IM: CPT | Mod: S$GLB,,, | Performed by: FAMILY MEDICINE

## 2024-06-25 PROCEDURE — 1159F MED LIST DOCD IN RCRD: CPT | Mod: CPTII,S$GLB,, | Performed by: FAMILY MEDICINE

## 2024-06-25 PROCEDURE — 99396 PREV VISIT EST AGE 40-64: CPT | Mod: 25,S$GLB,, | Performed by: FAMILY MEDICINE

## 2024-06-25 PROCEDURE — 3078F DIAST BP <80 MM HG: CPT | Mod: CPTII,S$GLB,, | Performed by: FAMILY MEDICINE

## 2024-06-25 PROCEDURE — 3075F SYST BP GE 130 - 139MM HG: CPT | Mod: CPTII,S$GLB,, | Performed by: FAMILY MEDICINE

## 2024-06-25 PROCEDURE — 99999 PR PBB SHADOW E&M-EST. PATIENT-LVL III: CPT | Mod: PBBFAC,,, | Performed by: FAMILY MEDICINE

## 2024-06-25 PROCEDURE — 3008F BODY MASS INDEX DOCD: CPT | Mod: CPTII,S$GLB,, | Performed by: FAMILY MEDICINE

## 2024-06-25 PROCEDURE — 1160F RVW MEDS BY RX/DR IN RCRD: CPT | Mod: CPTII,S$GLB,, | Performed by: FAMILY MEDICINE

## 2024-06-25 RX ORDER — TRIAMCINOLONE ACETONIDE 40 MG/ML
40 INJECTION, SUSPENSION INTRA-ARTICULAR; INTRAMUSCULAR
Status: COMPLETED | OUTPATIENT
Start: 2024-06-25 | End: 2024-06-25

## 2024-06-25 RX ORDER — IBUPROFEN 800 MG/1
TABLET ORAL
Qty: 90 TABLET | Refills: 0 | Status: SHIPPED | OUTPATIENT
Start: 2024-06-25

## 2024-06-25 RX ADMIN — TRIAMCINOLONE ACETONIDE 40 MG: 40 INJECTION, SUSPENSION INTRA-ARTICULAR; INTRAMUSCULAR at 09:06

## 2024-06-25 NOTE — PROGRESS NOTES
After obtaining consent, and per orders of Dr. Holliday, injection of Kenalog 40MG given IM by Crystal Luevano. Patient tolerated well Patient instructed to remain in clinic for 15 minutes afterwards, and to report any adverse reaction to staff immediately.

## 2024-06-29 ENCOUNTER — LAB VISIT (OUTPATIENT)
Dept: LAB | Facility: HOSPITAL | Age: 64
End: 2024-06-29
Payer: COMMERCIAL

## 2024-06-29 DIAGNOSIS — Z00.00 ANNUAL PHYSICAL EXAM: ICD-10-CM

## 2024-06-29 LAB
ALBUMIN SERPL BCP-MCNC: 3.6 G/DL (ref 3.5–5.2)
ALP SERPL-CCNC: 68 U/L (ref 55–135)
ALT SERPL W/O P-5'-P-CCNC: 14 U/L (ref 10–44)
ANION GAP SERPL CALC-SCNC: 11 MMOL/L (ref 8–16)
AST SERPL-CCNC: 20 U/L (ref 10–40)
BASOPHILS # BLD AUTO: 0.04 K/UL (ref 0–0.2)
BASOPHILS NFR BLD: 0.4 % (ref 0–1.9)
BILIRUB SERPL-MCNC: 0.3 MG/DL (ref 0.1–1)
BUN SERPL-MCNC: 20 MG/DL (ref 8–23)
CALCIUM SERPL-MCNC: 11 MG/DL (ref 8.7–10.5)
CHLORIDE SERPL-SCNC: 105 MMOL/L (ref 95–110)
CHOLEST SERPL-MCNC: 236 MG/DL (ref 120–199)
CHOLEST/HDLC SERPL: 4.1 {RATIO} (ref 2–5)
CO2 SERPL-SCNC: 21 MMOL/L (ref 23–29)
CREAT SERPL-MCNC: 0.9 MG/DL (ref 0.5–1.4)
DIFFERENTIAL METHOD BLD: ABNORMAL
EOSINOPHIL # BLD AUTO: 0.1 K/UL (ref 0–0.5)
EOSINOPHIL NFR BLD: 0.8 % (ref 0–8)
ERYTHROCYTE [DISTWIDTH] IN BLOOD BY AUTOMATED COUNT: 14.6 % (ref 11.5–14.5)
EST. GFR  (NO RACE VARIABLE): >60 ML/MIN/1.73 M^2
ESTIMATED AVG GLUCOSE: 126 MG/DL (ref 68–131)
GLUCOSE SERPL-MCNC: 94 MG/DL (ref 70–110)
HBA1C MFR BLD: 6 % (ref 4–5.6)
HCT VFR BLD AUTO: 35.7 % (ref 37–48.5)
HDLC SERPL-MCNC: 58 MG/DL (ref 40–75)
HDLC SERPL: 24.6 % (ref 20–50)
HGB BLD-MCNC: 11.4 G/DL (ref 12–16)
HIV 1+2 AB+HIV1 P24 AG SERPL QL IA: NORMAL
IMM GRANULOCYTES # BLD AUTO: 0.03 K/UL (ref 0–0.04)
IMM GRANULOCYTES NFR BLD AUTO: 0.3 % (ref 0–0.5)
LDLC SERPL CALC-MCNC: 164.2 MG/DL (ref 63–159)
LYMPHOCYTES # BLD AUTO: 4.5 K/UL (ref 1–4.8)
LYMPHOCYTES NFR BLD: 48.9 % (ref 18–48)
MCH RBC QN AUTO: 29.1 PG (ref 27–31)
MCHC RBC AUTO-ENTMCNC: 31.9 G/DL (ref 32–36)
MCV RBC AUTO: 91 FL (ref 82–98)
MONOCYTES # BLD AUTO: 0.6 K/UL (ref 0.3–1)
MONOCYTES NFR BLD: 6.4 % (ref 4–15)
NEUTROPHILS # BLD AUTO: 4 K/UL (ref 1.8–7.7)
NEUTROPHILS NFR BLD: 43.2 % (ref 38–73)
NONHDLC SERPL-MCNC: 178 MG/DL
NRBC BLD-RTO: 0 /100 WBC
PLATELET # BLD AUTO: 294 K/UL (ref 150–450)
PMV BLD AUTO: 10.1 FL (ref 9.2–12.9)
POTASSIUM SERPL-SCNC: 3.4 MMOL/L (ref 3.5–5.1)
PROT SERPL-MCNC: 7.5 G/DL (ref 6–8.4)
RBC # BLD AUTO: 3.92 M/UL (ref 4–5.4)
SODIUM SERPL-SCNC: 137 MMOL/L (ref 136–145)
T4 FREE SERPL-MCNC: 0.95 NG/DL (ref 0.71–1.51)
TRIGL SERPL-MCNC: 69 MG/DL (ref 30–150)
TSH SERPL DL<=0.005 MIU/L-ACNC: 0.35 UIU/ML (ref 0.4–4)
WBC # BLD AUTO: 9.17 K/UL (ref 3.9–12.7)

## 2024-06-29 PROCEDURE — 87389 HIV-1 AG W/HIV-1&-2 AB AG IA: CPT | Performed by: FAMILY MEDICINE

## 2024-06-29 PROCEDURE — 84443 ASSAY THYROID STIM HORMONE: CPT | Performed by: FAMILY MEDICINE

## 2024-06-29 PROCEDURE — 85025 COMPLETE CBC W/AUTO DIFF WBC: CPT | Performed by: FAMILY MEDICINE

## 2024-06-29 PROCEDURE — 80061 LIPID PANEL: CPT | Performed by: FAMILY MEDICINE

## 2024-06-29 PROCEDURE — 36415 COLL VENOUS BLD VENIPUNCTURE: CPT | Mod: PO | Performed by: FAMILY MEDICINE

## 2024-06-29 PROCEDURE — 83036 HEMOGLOBIN GLYCOSYLATED A1C: CPT | Performed by: FAMILY MEDICINE

## 2024-06-29 PROCEDURE — 80053 COMPREHEN METABOLIC PANEL: CPT | Performed by: FAMILY MEDICINE

## 2024-06-29 PROCEDURE — 84439 ASSAY OF FREE THYROXINE: CPT | Performed by: FAMILY MEDICINE

## 2024-06-30 NOTE — PROGRESS NOTES
To address Subjective     Patient ID: Irene Lawton is a 64 y.o. female.    Chief Complaint: Hypertension (3 month follow up )    Pt presents today for annual exam, as well as addressing her HTN  Patient also complains of back pain that appears to be sciatica    Hypertension  Pertinent negatives include no chest pain, headaches, palpitations or shortness of breath.     Review of Systems   Constitutional: Negative.  Negative for activity change, appetite change and chills.   Eyes: Negative.    Respiratory:  Negative for cough, chest tightness and shortness of breath.    Cardiovascular:  Negative for chest pain, palpitations and leg swelling.   Gastrointestinal: Negative.    Musculoskeletal: Negative.  Negative for joint swelling.   Integumentary:  Negative.   Neurological:  Negative for dizziness, weakness, light-headedness and headaches.   All other systems reviewed and are negative.         Objective     Physical Exam  Vitals reviewed.   Constitutional:       General: She is not in acute distress.     Appearance: Normal appearance. She is well-developed and normal weight. She is not ill-appearing, toxic-appearing or diaphoretic.   HENT:      Head: Normocephalic and atraumatic.      Right Ear: Tympanic membrane, ear canal and external ear normal. There is no impacted cerumen.      Left Ear: Tympanic membrane, ear canal and external ear normal. There is no impacted cerumen.      Nose: Nose normal.      Mouth/Throat:      Pharynx: No oropharyngeal exudate or posterior oropharyngeal erythema.   Eyes:      Extraocular Movements: Extraocular movements intact.      Conjunctiva/sclera: Conjunctivae normal.      Pupils: Pupils are equal, round, and reactive to light.   Neck:      Thyroid: No thyromegaly.   Cardiovascular:      Rate and Rhythm: Normal rate and regular rhythm.      Pulses: Normal pulses.      Heart sounds: Normal heart sounds. No murmur heard.  Pulmonary:      Effort: Pulmonary effort is normal. No  respiratory distress.      Breath sounds: Normal breath sounds. No stridor. No wheezing, rhonchi or rales.   Chest:      Chest wall: No tenderness.   Abdominal:      General: Bowel sounds are normal. There is no distension.      Palpations: Abdomen is soft. There is no mass.      Tenderness: There is no abdominal tenderness. There is no guarding or rebound.   Musculoskeletal:         General: No tenderness. Normal range of motion.      Cervical back: Normal range of motion and neck supple.      Right lower leg: No edema.      Left lower leg: No edema.   Lymphadenopathy:      Cervical: No cervical adenopathy.   Skin:     General: Skin is warm and dry.      Findings: No erythema.   Neurological:      General: No focal deficit present.      Mental Status: She is alert and oriented to person, place, and time. Mental status is at baseline.      Cranial Nerves: No cranial nerve deficit.      Sensory: No sensory deficit.      Motor: No weakness.      Coordination: Coordination normal.      Gait: Gait normal.      Deep Tendon Reflexes: Reflexes are normal and symmetric. Reflexes normal.   Psychiatric:         Mood and Affect: Mood normal.         Behavior: Behavior normal.         Thought Content: Thought content normal.         Judgment: Judgment normal.          Assessment and Plan     1. Back pain, unspecified back location, unspecified back pain laterality, unspecified chronicity  -     ibuprofen (ADVIL,MOTRIN) 800 MG tablet; Take one tab po TID prn back pain  Dispense: 90 tablet; Refill: 0    2. Annual physical exam  -     CBC Auto Differential; Future; Expected date: 06/25/2024  -     Comprehensive Metabolic Panel; Future; Expected date: 06/25/2024  -     Lipid Panel; Future; Expected date: 06/25/2024  -     Hemoglobin A1C; Future; Expected date: 06/25/2024  -     TSH; Future; Expected date: 06/25/2024  -     HIV 1/2 Ag/Ab (4th Gen); Future; Expected date: 06/25/2024    3. Screening mammogram for breast cancer  -      Mammo Digital Screening Bilat w/ Saeid; Future; Expected date: 06/25/2024    4. Chronic nasal congestion  -     triamcinolone acetonide injection 40 mg      HTN looks well controlled. Pt encouraged to exercise and continue with lifestyle modification to bring down her blood pressures instead of adding another medication.  RTC 4 weeks for telemed w log  Hypercalcemia:  Stable  Back pain not new patient encouraged to continue with exercises      Lifestyle mods d/w pt needs to start exercising

## 2024-07-02 ENCOUNTER — TELEPHONE (OUTPATIENT)
Dept: PRIMARY CARE CLINIC | Facility: CLINIC | Age: 64
End: 2024-07-02
Payer: COMMERCIAL

## 2024-07-02 NOTE — TELEPHONE ENCOUNTER
----- Message from Annette Holliday MD sent at 7/2/2024 11:22 AM CDT -----  Pt needs telemed to review labs. Thanks, PV

## 2024-07-08 ENCOUNTER — OFFICE VISIT (OUTPATIENT)
Dept: PRIMARY CARE CLINIC | Facility: CLINIC | Age: 64
End: 2024-07-08
Attending: FAMILY MEDICINE
Payer: COMMERCIAL

## 2024-07-08 DIAGNOSIS — E83.52 SERUM CALCIUM ELEVATED: ICD-10-CM

## 2024-07-08 DIAGNOSIS — E78.5 ELEVATED LIPIDS: ICD-10-CM

## 2024-07-08 DIAGNOSIS — R79.89 DECREASED THYROID STIMULATING HORMONE (TSH) LEVEL: ICD-10-CM

## 2024-07-08 DIAGNOSIS — R73.9 ELEVATED BLOOD SUGAR: Primary | ICD-10-CM

## 2024-07-08 PROCEDURE — 3044F HG A1C LEVEL LT 7.0%: CPT | Mod: CPTII,95,, | Performed by: FAMILY MEDICINE

## 2024-07-08 PROCEDURE — 99214 OFFICE O/P EST MOD 30 MIN: CPT | Mod: 95,,, | Performed by: FAMILY MEDICINE

## 2024-07-08 NOTE — PROGRESS NOTES
Subjective:       Patient ID: Irene Lawton is a 64 y.o. female.    Chief Complaint: HTN    The patient location is: home  The chief complaint leading to consultation is: above    Visit type: audiovisual    Face to Face time with patient: 20 minutes 20 minutes of total time spent on the encounter, which includes face to face time and non-face to face time preparing to see the patient (eg, review of tests), Obtaining and/or reviewing separately obtained history, Documenting clinical information in the electronic or other health record, Independently interpreting results (not separately reported) and communicating results to the patient/family/caregiver, or Care coordination (not separately reported).     Each patient to whom he or she provides medical services by telemedicine is:  (1) informed of the relationship between the physician and patient and the respective role of any other health care provider with respect to management of the patient; and (2) notified that he or she may decline to receive medical services by telemedicine and may withdraw from such care at any time.    Notes:     Patient presents today with review of lab results which have multiple areas of concern;    1 elevated LDL  2 low TSH normal free T4  3 increased A1c  4. Increased calcium decreased potassium    Back Pain  This is a recurrent problem. The current episode started in the past 7 days. The problem occurs 2 to 4 times per day. The problem has been gradually worsening since onset. The pain is present in the lumbar spine. The quality of the pain is described as aching. The pain radiates to the right thigh. The pain is at a severity of 4/10. The patient is experiencing no pain. The pain is The same all the time. The symptoms are aggravated by position. Pertinent negatives include no chest pain, dysuria, headaches or weakness.     Review of Systems   Constitutional:  Negative for activity change and unexpected weight change.   HENT:   Negative for hearing loss, rhinorrhea and trouble swallowing.    Eyes:  Negative for discharge and visual disturbance.   Respiratory:  Negative for chest tightness and wheezing.    Cardiovascular:  Negative for chest pain and palpitations.   Gastrointestinal:  Negative for blood in stool, constipation, diarrhea and vomiting.   Endocrine: Negative for polydipsia and polyuria.   Genitourinary:  Negative for difficulty urinating, dysuria, hematuria and menstrual problem.   Musculoskeletal:  Positive for back pain. Negative for arthralgias, gait problem, joint swelling, myalgias and neck pain.   Neurological:  Negative for weakness and headaches.   Psychiatric/Behavioral:  Negative for confusion and dysphoric mood.    All other systems reviewed and are negative.        Objective:      Physical Exam  Vitals reviewed.   Constitutional:       General: She is not in acute distress.     Appearance: Normal appearance. She is well-developed. She is not ill-appearing, toxic-appearing or diaphoretic.   HENT:      Head: Normocephalic and atraumatic.   Eyes:      Extraocular Movements: Extraocular movements intact.   Pulmonary:      Effort: Pulmonary effort is normal.   Musculoskeletal:         General: Normal range of motion.      Cervical back: Neck supple.      Right lower leg: No edema.      Left lower leg: No edema.   Neurological:      Mental Status: She is alert and oriented to person, place, and time.   Psychiatric:         Mood and Affect: Mood normal.         Behavior: Behavior normal.         Thought Content: Thought content normal.         Judgment: Judgment normal.         Assessment:   Low low potassium, increase calcium    Hypertension  Elevated A1c  Elevated lipids  Low TSH  Plan:   Explained to patient that the TSH needs to be repeated.  Although the TSH is low, patient is asymptomatic does not exhibit signs of height per thyroidism and I suspect that this is not a true thyroid issue.  Especially with free T4  being normal  Hypertension controlled  Low potassium increasing calcium patient does seem to have this trend with correction of labs on repeat however we will repeat could be related strictly to dehydration prior to fasting labs  Elevated lipids patient needs to change her lifestyle.  Patient does have poor eating habits and agrees that she does need to eat better.  Patient will also resume exercise    Repeat labs in 3 months fasting lifestyle modification discussed with patient in depth.  Follow up 1 week after labs TeleMed

## 2024-07-09 ENCOUNTER — TELEPHONE (OUTPATIENT)
Dept: PRIMARY CARE CLINIC | Facility: CLINIC | Age: 64
End: 2024-07-09
Payer: COMMERCIAL

## 2024-07-09 NOTE — TELEPHONE ENCOUNTER
----- Message from Annette Holliday MD sent at 7/8/2024  6:02 PM CDT -----  Pl schedule labs 3 mos fasting thanks, PV

## 2024-08-22 NOTE — TELEPHONE ENCOUNTER
No care due was identified.  [facility] Embedded Care Due Messages. Reference number: [id number].   8/22/2024 6:37:30 PM CDT

## 2024-08-23 ENCOUNTER — TELEPHONE (OUTPATIENT)
Dept: UROGYNECOLOGY | Facility: CLINIC | Age: 64
End: 2024-08-23

## 2024-08-23 RX ORDER — HYDROCHLOROTHIAZIDE 25 MG/1
25 TABLET ORAL DAILY
Qty: 90 TABLET | Refills: 1 | Status: SHIPPED | OUTPATIENT
Start: 2024-08-23 | End: 2025-08-23

## 2024-08-23 RX ORDER — ESTRADIOL 0.1 MG/G
0.5 CREAM VAGINAL
Qty: 45 G | Refills: 11 | Status: SHIPPED | OUTPATIENT
Start: 2024-08-26

## 2024-08-23 NOTE — TELEPHONE ENCOUNTER
Confirmed with [facility] pharmacist [phone].  Route: Place 0.5 g vaginally twice a week. Apply to the vagina daily for two weeks then twice a week. - Vaginal Call ended

## 2024-08-23 NOTE — TELEPHONE ENCOUNTER
Refill Encounter    PCP Visits: Recent Visits  Date Type Provider Dept   07/08/24 Office Visit Annette Holliday MD [facility]   06/25/24 Office Visit Annette Holliday MD [facility]   04/30/24 Office Visit Annette Holliday MD [facility]   03/18/24 Office Visit Annette Holliday MD [facility]   01/10/24 Office Visit Aury Sequeira MD [facility]   Showing recent visits within past 360 days and meeting all other requirements  Future Appointments  No visits were found meeting these conditions.  Showing future appointments within next 720 days and meeting all other requirements     Last 3 Blood Pressure:   BP Readings from Last 3 Encounters:   06/25/24 135/76   06/17/24 122/74   06/03/24 127/70     Preferred Pharmacy:   [facility] [place] - [address] [place] [address]  Phone: [phone] Fax: [phone]      Requested RX:  Requested Prescriptions     Pending Prescriptions Disp Refills    hydroCHLOROthiazide (HYDRODIURIL) 25 MG tablet 90 tablet 1     Sig: Take 1 tablet (25 mg total) by mouth once daily.      RX Route: Normal

## 2024-08-23 NOTE — TELEPHONE ENCOUNTER
----- Message from Sumi Velasco sent at 8/23/2024  9:20 AM CDT -----  Name of Who is Calling: [facility] calling from [facility] [[id number]]                What is the request in detail: Calling because they have 2 different direction for estradioL (ESTRACE) 0.01 % (0.1 mg/gram) vaginal cream  and she asking for a call back to see which one to use.Please call back to further assist.                 Can the clinic reply by [facility]: No                [facility]: [phone]

## 2024-08-23 NOTE — TELEPHONE ENCOUNTER
Refill Decision Note   Roanokerio Lawton  is requesting a refill authorization.  Brief Assessment and Rationale for Refill:  Approve     Medication Therapy Plan:         Comments:     Note composed:7:03 AM 08/23/2024

## 2024-09-10 ENCOUNTER — OFFICE VISIT (OUTPATIENT)
Dept: PODIATRY | Facility: CLINIC | Age: 64
End: 2024-09-10
Payer: COMMERCIAL

## 2024-09-10 VITALS — BODY MASS INDEX: 33.34 KG/M2 | HEIGHT: 62 IN | WEIGHT: 181.19 LBS

## 2024-09-10 DIAGNOSIS — L03.032 PARONYCHIA, TOE, LEFT: ICD-10-CM

## 2024-09-10 DIAGNOSIS — L60.0 INGROWN NAIL: Primary | ICD-10-CM

## 2024-09-10 DIAGNOSIS — M79.675 TOE PAIN, LEFT: ICD-10-CM

## 2024-09-10 PROCEDURE — 99203 OFFICE O/P NEW LOW 30 MIN: CPT | Mod: S$GLB,,, | Performed by: PODIATRIST

## 2024-09-10 PROCEDURE — 3008F BODY MASS INDEX DOCD: CPT | Mod: CPTII,S$GLB,, | Performed by: PODIATRIST

## 2024-09-10 PROCEDURE — 99999 PR PBB SHADOW E&M-EST. PATIENT-LVL III: CPT | Mod: PBBFAC,,, | Performed by: PODIATRIST

## 2024-09-10 PROCEDURE — 1159F MED LIST DOCD IN RCRD: CPT | Mod: CPTII,S$GLB,, | Performed by: PODIATRIST

## 2024-09-10 PROCEDURE — 3044F HG A1C LEVEL LT 7.0%: CPT | Mod: CPTII,S$GLB,, | Performed by: PODIATRIST

## 2024-09-10 RX ORDER — TOBRAMYCIN 3 MG/ML
SOLUTION/ DROPS OPHTHALMIC
Qty: 5 ML | Refills: 3 | Status: SHIPPED | OUTPATIENT
Start: 2024-09-10

## 2024-09-10 NOTE — PROGRESS NOTES
Subjective:      Patient ID: Irene Lawton is a 64 y.o. female.    Chief Complaint: Ingrown Toenail    Sharp, throbbing pain left big toe/toenail.  Gradual onset, worsening over the past few days.  Aggravated by socks shoes pressure ambulation.  No prior medical treatment.  No self-treatment.  Denies trauma and surgery both feet.    Review of Systems   Constitutional: Negative for chills, diaphoresis, fever, malaise/fatigue and night sweats.   Cardiovascular:  Negative for claudication, cyanosis, leg swelling and syncope.   Skin:  Positive for nail changes. Negative for color change, dry skin, rash, suspicious lesions and unusual hair distribution.   Musculoskeletal:  Negative for falls, joint pain, joint swelling, muscle cramps, muscle weakness and stiffness.   Gastrointestinal:  Negative for constipation, diarrhea, nausea and vomiting.   Neurological:  Negative for brief paralysis, disturbances in coordination, focal weakness, numbness, paresthesias, sensory change and tremors.           Objective:      Physical Exam  Constitutional:       General: She is not in acute distress.     Appearance: She is well-developed. She is not diaphoretic.   Cardiovascular:      Pulses:           Popliteal pulses are 2+ on the right side and 2+ on the left side.        Dorsalis pedis pulses are 2+ on the right side and 2+ on the left side.        Posterior tibial pulses are 2+ on the right side and 2+ on the left side.      Comments: Capillary refill 3 seconds all toes/distal feet, all toes/both feet warm to touch.      Negative lymphadenopathy bilateral popliteal fossa and tarsal tunnel.      Negavie lower extremity edema bilateral.    Musculoskeletal:      Right ankle: No swelling, deformity, ecchymosis or lacerations. Normal range of motion. Normal pulse.      Right Achilles Tendon: Normal. No defects. Del Rio's test negative.      Comments: Normal angle, base, station of gait. All ten toes without clubbing, cyanosis, or  signs of ischemia.  No pain to palpation bilateral lower extremities.  Range of motion, stability, muscle strength, and muscle tone normal bilateral feet and legs.    Lymphadenopathy:      Lower Body: No right inguinal adenopathy. No left inguinal adenopathy.      Comments: Negative lymphadenopathy bilateral popliteal fossa and tarsal tunnel.    Negative lymphangitic streaking bilateral feet/ankles/legs.   Skin:     General: Skin is warm and dry.      Capillary Refill: Capillary refill takes 2 to 3 seconds.      Coloration: Skin is not pale.      Findings: No abrasion, bruising, burn, ecchymosis, erythema, laceration, lesion or rash.      Nails: There is no clubbing.      Comments: Visible and palpable ingrowth of toenail medial border left hallux with pain to palpation, and focal localized erythema and edema,  without ulceration, drainage, pus, tracking, fluctuance, malodor, or cardinal signs infection.       Neurological:      Mental Status: She is alert and oriented to person, place, and time.      Sensory: No sensory deficit.      Motor: No tremor, atrophy or abnormal muscle tone.      Gait: Gait normal.      Deep Tendon Reflexes:      Reflex Scores:       Patellar reflexes are 2+ on the right side and 2+ on the left side.       Achilles reflexes are 2+ on the right side and 2+ on the left side.  Psychiatric:         Behavior: Behavior is cooperative.             Assessment:       Encounter Diagnoses   Name Primary?    Ingrown nail Yes    Paronychia, toe, left     Toe pain, left          Plan:       Irene was seen today for ingrown toenail.    Diagnoses and all orders for this visit:    Ingrown nail    Paronychia, toe, left    Toe pain, left    Other orders  -     tobramycin sulfate 0.3% (TOBREX) 0.3 % ophthalmic solution; 1-2 drops topically twice daily to affected toe(s).      I counseled the patient on her conditions, their implications and medical management.    Tobramycin drops twice daily left hallux.       Cover left hallux all times with Band-Aid or similar changing daily.      Discussed conservative treatment with shoes of adequate dimensions, material, and style to alleviate symptoms and delay or prevent surgical intervention.      Utilizing sterile toenail clippers I aggressively debrided the offending nail border approximately 3 mm from its edge and carried the nail plate incision down at an angle in order to wedge out the offending cryptotic portion of the nail plate. The offending border was then removed in toto. The area was cleansed with alcohol. Patient tolerated the procedure well and related significant relief.          No follow-ups on file.

## 2024-10-24 ENCOUNTER — PATIENT MESSAGE (OUTPATIENT)
Dept: ADMINISTRATIVE | Facility: OTHER | Age: 64
End: 2024-10-24
Payer: COMMERCIAL

## 2024-10-28 ENCOUNTER — TELEPHONE (OUTPATIENT)
Dept: VASCULAR SURGERY | Facility: CLINIC | Age: 64
End: 2024-10-28
Payer: COMMERCIAL

## 2024-11-21 ENCOUNTER — HOSPITAL ENCOUNTER (OUTPATIENT)
Dept: RADIOLOGY | Facility: OTHER | Age: 64
Discharge: HOME | End: 2024-11-21
Attending: FAMILY MEDICINE
Payer: COMMERCIAL

## 2024-11-21 DIAGNOSIS — Z12.31 SCREENING MAMMOGRAM FOR BREAST CANCER: ICD-10-CM

## 2024-11-21 PROCEDURE — 77067 SCR MAMMO BI INCL CAD: CPT | Mod: TC

## 2024-12-09 ENCOUNTER — HOSPITAL ENCOUNTER (EMERGENCY)
Facility: OTHER | Age: 64
Discharge: HOME | End: 2024-12-09
Attending: EMERGENCY MEDICINE
Payer: COMMERCIAL

## 2024-12-09 VITALS
HEIGHT: 63 IN | DIASTOLIC BLOOD PRESSURE: 83 MMHG | BODY MASS INDEX: 31.89 KG/M2 | TEMPERATURE: 98 F | SYSTOLIC BLOOD PRESSURE: 197 MMHG | OXYGEN SATURATION: 99 % | RESPIRATION RATE: 20 BRPM | HEART RATE: 56 BPM | WEIGHT: 180 LBS

## 2024-12-09 DIAGNOSIS — M79.604 LOW BACK PAIN RADIATING TO RIGHT LOWER EXTREMITY: Primary | ICD-10-CM

## 2024-12-09 DIAGNOSIS — M54.50 LOW BACK PAIN RADIATING TO RIGHT LOWER EXTREMITY: Primary | ICD-10-CM

## 2024-12-09 PROCEDURE — 63600175 PHARM REV CODE 636 W HCPCS: Performed by: EMERGENCY MEDICINE

## 2024-12-09 PROCEDURE — 99284 EMERGENCY DEPT VISIT MOD MDM: CPT | Mod: 25

## 2024-12-09 PROCEDURE — 96372 THER/PROPH/DIAG INJ SC/IM: CPT | Performed by: EMERGENCY MEDICINE

## 2024-12-09 RX ORDER — NAPROXEN 500 MG/1
500 TABLET ORAL 2 TIMES DAILY WITH MEALS
Qty: 60 TABLET | Refills: 0 | Status: SHIPPED | OUTPATIENT
Start: 2024-12-09

## 2024-12-09 RX ORDER — KETOROLAC TROMETHAMINE 30 MG/ML
30 INJECTION, SOLUTION INTRAMUSCULAR; INTRAVENOUS
Status: COMPLETED | OUTPATIENT
Start: 2024-12-09 | End: 2024-12-09

## 2024-12-09 RX ORDER — METHYLPREDNISOLONE 4 MG/1
TABLET ORAL
Qty: 1 EACH | Refills: 0 | Status: SHIPPED | OUTPATIENT
Start: 2024-12-09 | End: 2024-12-30

## 2024-12-09 RX ORDER — METHOCARBAMOL 500 MG/1
1000 TABLET, FILM COATED ORAL 3 TIMES DAILY
Qty: 30 TABLET | Refills: 0 | Status: SHIPPED | OUTPATIENT
Start: 2024-12-09 | End: 2024-12-14

## 2024-12-09 RX ORDER — HYDROCODONE BITARTRATE AND ACETAMINOPHEN 5; 325 MG/1; MG/1
1 TABLET ORAL EVERY 6 HOURS PRN
Qty: 6 TABLET | Refills: 0 | Status: SHIPPED | OUTPATIENT
Start: 2024-12-09

## 2024-12-09 RX ADMIN — KETOROLAC TROMETHAMINE 30 MG: 30 INJECTION, SOLUTION INTRAMUSCULAR; INTRAVENOUS at 04:12

## 2024-12-09 NOTE — Clinical Note
"Irene "Irene" Jered was seen and treated in our emergency department on 12/9/2024.  She may return to work on 12/11/2024.       If you have any questions or concerns, please don't hesitate to call.      Fabricio Mehta MD"

## 2024-12-09 NOTE — ED PROVIDER NOTES
Encounter Date: 12/9/2024       History     Chief Complaint   Patient presents with    Hip Pain     Pt reports intermittent R hip pain x 4 days, denies injury     HPI    64-year-old female with past medical history of anemia, anxiety, hypertension presents with right hip pain for the last 4 days.  Patient reports not doing anything in particular but has noticed for worsening right hip and right lower back pain, states it hurts worse to walk around currently, she reports no falls, denies any injury to that area.  She reports no increasing issues with her right hip, denies any fevers or chills, denies any numbness or tingling.  She does report the pain wraps around to the right lower side, does not have any shooting pain in the back of his right leg.  She reports no when she sits for long periods of time it is worse, and feels better whenever she stands up.  She reports it is worse in the morning and throughout the day will get slightly better.  She denies any issues with the back previously, denies any further complaints    Review of patient's allergies indicates:   Allergen Reactions    No known allergies      Past Medical History:   Diagnosis Date    ALLERGIC RHINITIS     Anemia     Anxiety     Cancer     right neck    Hypertension     Sprain of anterior talofibular ligament of left ankle 10/14/2023    Transient elevated blood pressure      Past Surgical History:   Procedure Laterality Date    COLONOSCOPY N/A 12/12/2020    Procedure: COLONOSCOPY;  Surgeon: GHADA Gamez MD;  Location: [address];  Service: Endoscopy;  Laterality: N/A;  Pt reports family Hx colon cancer (Pt's father )  prep ins. emailed - COVID screening on 12/9/20 [facility]    COLPORRHAPHY, COMBINED ANTEROPOSTERIOR N/A 05/12/2023    Procedure: COLPORRHAPHY, COMBINED ANTEROPOSTERIOR;  Surgeon: Lulu Parham DO;  Location: [facility] OR;  Service: OB/GYN;  Laterality: N/A;    CYSTOSCOPY  05/12/2023    Procedure: CYSTOSCOPY;   Surgeon: Lulu Parham DO;  Location: [facility] OR;  Service: OB/GYN;;    HYSTERECTOMY      LAPAROSCOPIC SUSPENSION OF UTEROSACRAL LIGAMENT N/A 05/12/2023    Procedure: SUSPENSION, LIGAMENT, UTEROSACRAL, LAPAROSCOPIC;  Surgeon: Lulu Parham DO;  Location: [facility] OR;  Service: OB/GYN;  Laterality: N/A;    LAPAROSCOPIC TOTAL HYSTERECTOMY N/A 05/12/2023    Procedure: HYSTERECTOMY, TOTAL, LAPAROSCOPIC;  Surgeon: Lulu Parham DO;  Location: [facility] OR;  Service: OB/GYN;  Laterality: N/A;  VNOTES / 4 HOURS    LYSIS OF ADHESIONS N/A 05/12/2023    Procedure: LYSIS, ADHESIONS;  Surgeon: Lulu aPrham DO;  Location: [facility] OR;  Service: OB/GYN;  Laterality: N/A;    SKIN BIOPSY      Surgical removal neck cancer Right     TUBAL LIGATION       Family History   Problem Relation Name Age of Onset    Hypertension Mother Miay     Breast cancer Mother Miya     Cancer Sister Julisa Lawton     Colon cancer Neg Hx      Ovarian cancer Neg Hx       Social History     Tobacco Use    Smoking status: Never     Passive exposure: Never    Smokeless tobacco: Never   Substance Use Topics    Alcohol use: Not Currently     Comment: Social    Drug use: No     Review of Systems   Constitutional: Negative.    HENT: Negative.     Eyes: Negative.    Respiratory: Negative.     Cardiovascular: Negative.    Gastrointestinal: Negative.    Genitourinary: Negative.    Musculoskeletal:  Positive for back pain (right lower back and right hip pain).   Skin: Negative.    Neurological: Negative.        Physical Exam     Initial Vitals [12/09/24 1546]   BP Pulse Resp Temp SpO2   (!) 197/83 (!) 56 20 98.3 °F (36.8 °C) 99 %      MAP       --         Physical Exam    Nursing note and vitals reviewed.  Constitutional: She appears well-developed and well-nourished. She is not diaphoretic. No distress.   HENT:   Head: Normocephalic and atraumatic.   Nose: Nose normal.   Eyes: EOM are normal. Pupils are equal, round, and reactive to light.    Neck: Neck supple. No JVD present.   Normal range of motion.  Pulmonary/Chest: No stridor. No respiratory distress.   Abdominal: Abdomen is soft. Bowel sounds are normal. She exhibits no distension. There is no abdominal tenderness.   Musculoskeletal:         General: Tenderness present. No edema. Normal range of motion.      Cervical back: Normal range of motion and neck supple.      Comments: mild tenderness over the right paralumbar spinal area, over the right SI joint, no overlying skin changes noted, no pain with right lower extremity logroll, no edema noted in the right lower extremity, negative straight leg raise, ambulatory with steady gait     Neurological: She is alert and oriented to person, place, and time. She has normal strength.   Skin: Skin is warm and dry. Capillary refill takes less than 2 seconds. No rash noted. No erythema.         ED Course   Procedures  Labs Reviewed - No data to display       Imaging Results              X-Ray Hip 2 or 3 views Right with Pelvis when performed (Final result)  Result time 12/09/24 16:39:58      Final result by Flavio Good DO (12/09/24 16:39:58)                   Impression:      No acute osseous abnormality.      Electronically signed by: Flavio Good  Date:    12/09/2024  Time:    16:39               Narrative:    EXAMINATION:  XR HIP WITH PELVIS WHEN PERFORMED 2 OR 3 VIEWS RIGHT    CLINICAL HISTORY:  right hip pain;    TECHNIQUE:  AP view of the pelvis and frog leg lateral view of the right hip were performed.    COMPARISON:  None    FINDINGS:  There is no evidence of an acute fracture or dislocation of the pelvis or the right hip.  Alignment is normal.  The femoral heads are well seated in the acetabula.  Remaining osseous structures are intact.                                       Medications   ketorolac injection 30 mg (30 mg Intramuscular Given 12/9/24 8013)     Medical Decision Making                  MDM:    64-year-old female with past  medical history of anemia, anxiety, hypertension presents with right hip pain for the last 4 days.  Differential Diagnosis includes:  Fracture, dislocation, muscular strain, radiculopathy/sciatica.  Physical exam as noted above.  ED workup notable for x-ray of the right hip is unremarkable.  Patient presentation appears consistent with right lower back muscular strain, symptoms appear consistent, tenderness consistent, she has no significant neuro deficits, no radiculopathy to warrant further investigation, x-ray areas unremarkable of the right hip joint, will continue supportive care measures at this point, referral placed.  Do not suspect any additional surgical or medical emergency. Discussed diagnosis and further treatment with patient, including f/u.  Return precautions given and all questions answered.  Patient in understanding of plan.  Pt discharged to home improved and stable.        Note was created using voice recognition software. Note may have occasional typographical or grammatical errors, garbled syntax, and other bizarre constructions that may not have been identified and edited despite good juliana initial review prior to signing.                             Clinical Impression:  Final diagnoses:  [M54.50, M79.604] Low back pain radiating to right lower extremity (Primary)          ED Disposition Condition    Discharge Stable          ED Prescriptions       Medication Sig Dispense Start Date End Date Auth. Provider    naproxen (NAPROSYN) 500 MG tablet Take 1 tablet (500 mg total) by mouth 2 (two) times daily with meals. 60 tablet 12/9/2024 -- Fabricio Mehta MD    methocarbamoL (ROBAXIN) 500 MG Tab Take 2 tablets (1,000 mg total) by mouth 3 (three) times daily. for 5 days 30 tablet 12/9/2024 12/14/2024 Fabricio Mehta MD    methylPREDNISolone (MEDROL DOSEPACK) 4 mg tablet Taper as directed 1 each 12/9/2024 12/30/2024 Fabricio Mehta MD    HYDROcodone-acetaminophen (NORCO) 5-325 mg  per tablet Take 1 tablet by mouth every 6 (six) hours as needed for Pain. 6 tablet 12/9/2024 -- Fabricio Mehta MD          Follow-up Information       Follow up With Specialties Details Why Contact Info    [facility] - Emergency Dept Emergency Medicine Go to  If symptoms worsen [address] [facility] [id number] [phone]    Annette Holliday MD Family Medicine Go in 1 week As needed [address] [address]  [phone]               Fabricio Mehta MD  12/10/24 1569

## 2024-12-10 ENCOUNTER — TELEPHONE (OUTPATIENT)
Dept: PRIMARY CARE CLINIC | Facility: CLINIC | Age: 64
End: 2024-12-10
Payer: COMMERCIAL

## 2024-12-11 ENCOUNTER — OFFICE VISIT (OUTPATIENT)
Dept: PRIMARY CARE CLINIC | Facility: CLINIC | Age: 64
End: 2024-12-11
Attending: FAMILY MEDICINE
Payer: COMMERCIAL

## 2024-12-11 DIAGNOSIS — I10 PRIMARY HYPERTENSION: ICD-10-CM

## 2024-12-11 DIAGNOSIS — M54.40 ACUTE RIGHT-SIDED LOW BACK PAIN WITH SCIATICA, SCIATICA LATERALITY UNSPECIFIED: Primary | ICD-10-CM

## 2024-12-11 PROCEDURE — 3044F HG A1C LEVEL LT 7.0%: CPT | Mod: CPTII,95,, | Performed by: FAMILY MEDICINE

## 2024-12-11 PROCEDURE — 99214 OFFICE O/P EST MOD 30 MIN: CPT | Mod: 95,,, | Performed by: FAMILY MEDICINE

## 2024-12-11 PROCEDURE — G2211 COMPLEX E/M VISIT ADD ON: HCPCS | Mod: 95,,, | Performed by: FAMILY MEDICINE

## 2024-12-11 NOTE — PROGRESS NOTES
Subjective:       Patient ID: Irene Lawton is a 64 y.o. female.    Chief Complaint:  Back pain    The patient location is:  Home  The chief complaint leading to consultation is:  Above    Visit type: audiovisual    Face to Face time with patient:  20 minutes 30 minutes of total time spent on the encounter, which includes face to face time and non-face to face time preparing to see the patient (eg, review of tests), Obtaining and/or reviewing separately obtained history, Documenting clinical information in the electronic or other health record, Independently interpreting results (not separately reported) and communicating results to the patient/family/caregiver, or Care coordination (not separately reported).     Each patient to whom he or she provides medical services by telemedicine is:  (1) informed of the relationship between the physician and patient and the respective role of any other health care provider with respect to management of the patient; and (2) notified that he or she may decline to receive medical services by telemedicine and may withdraw from such care at any time.    Notes:     History of Present Illness    CHIEF COMPLAINT:  Patient presents today for follow-up of back pain.    BACK PAIN:  She reports excruciating back pain that started 1.5 weeks ago, denying any falls or specific triggering events. The pain is localized with no radiation. She has a history of severe back pain approximately 14 months ago, which significantly impacted her daily activities.    MEDICATIONS:  She is currently taking steroids, Robaxin (muscle relaxant), and hydrocodone. She reports taking one hydrocodone on the first night. She is not currently taking ibuprofen due to concurrent steroid use.    GASTROINTESTINAL AND URINARY FUNCTION:  She denies nausea or vomiting. She reports fluctuating bowel movements, consistent with her usual pattern. She denies problems with urination, reporting normal urinary function,  including urinating before going to bed.      ROS:  General: -fever, -chills, -fatigue, -weight gain, -weight loss  Eyes: -vision changes, -redness, -discharge  ENT: -ear pain, -nasal congestion, -sore throat  Cardiovascular: -chest pain, -palpitations, -lower extremity edema  Respiratory: -cough, -shortness of breath  Gastrointestinal: -abdominal pain, -nausea, -vomiting, -diarrhea, -constipation, -blood in stool, +change in bowel habits  Genitourinary: -dysuria, -hematuria, -frequency, -difficulty urinating  Musculoskeletal: -joint pain, -muscle pain, +back pain  Skin: -rash, -lesion  Neurological: -headache, -dizziness, -numbness, -tingling  Psychiatric: -anxiety, -depression, -sleep difficulty          Objective:      Physical Exam    General: No acute distress. Well-developed. Well-nourished.  Eyes: EOMI. Sclerae anicteric.  HENT: Normocephalic. Atraumatic. Nares patent. Moist oral mucosa.  Ears: Bilateral TMs clear. Bilateral EACs clear.  Cardiovascular: Regular rate. Regular rhythm. No murmurs. No rubs. No gallops. Normal S1, S2.  Respiratory: Normal respiratory effort. Clear to auscultation bilaterally. No rales. No rhonchi. No wheezing.  Abdomen: Soft. Non-tender. Non-distended. Normoactive bowel sounds.  Musculoskeletal: No  obvious deformity.  Extremities: No lower extremity edema.  Neurological: Alert & oriented x3. No slurred speech. Normal gait.  Psychiatric: Normal mood. Normal affect. Good insight. Good judgment.  Skin: Warm. Dry. No rash.       Assessment:       1. Primary hypertension    2. Acute right-sided low back pain with sciatica, sciatica laterality unspecified        Plan:   Assessment & Plan    IMPRESSION:  - Assessed low back pain as likely severe muscle sprain rather than nerve-related issue, based on reported symptoms and absence of radiation/numbness/tingling  - Determined current treatment regimen (steroids, muscle relaxants, pain medication) appropriate for managing acute symptoms  -  Considered Healthy Back Program beneficial for long-term strengthening but not for immediate relief    LOW BACK PAIN:  - Explained difference between muscle-related and nerve-related back pain.  - Discussed steroids as strongest anti-inflammatory option, superior to ibuprofen.  - Clarified that steroids should significantly reduce inflammation but may not completely eliminate pain.  - Patient to use heating pad or warm water bottle for heat therapy at least 2 times daily, especially at night.  - Patient to apply Icy Hot patches during work hours.  - Recommend getting up and stretching periodically during seated work to reduce lower back stress.  - Patient to avoid activities that could trigger back pain for the next few weeks.  - Continued steroid Dosepak.  - Continued Robaxin (muscle relaxant).  - Continued hydrocodone as needed for pain.  - Discontinued ibuprofen while on steroid therapy.  - Referred to Healthy Back Program for back muscle strengthening.    FOLLOW-UP:  - Follow up as needed.  - Contact office if symptoms worsen or new concerns arise.      Visit today is associated with current or anticipated ongoing medical care related to this patient's single serious condition/complex condition of hypertension. The patient will return to see me as these issues will be followed longitudinally      Answers submitted by the patient for this visit:  Back Pain Questionnaire (Submitted on 12/10/2024)  Chief Complaint: Back pain  Pain location: lumbar spine  Pain quality: aching  Radiates to: does not radiate  Pain is: worse during the night  Aggravated by: lying down, sitting  Stiffness is present: all day  abdominal pain: No  bladder incontinence: No  bowel incontinence: No  chest pain: No  dysuria: No  fever: No  headaches: Yes  leg pain: No  numbness: No  paresis: No  paresthesias: No  pelvic pain: No  perianal numbness: No  tingling: No  weakness: No  weight loss: No  genital pain: No  hematuria: No  Pain  severity: moderate  Improvement on treatment: moderate

## 2024-12-12 NOTE — PROGRESS NOTES
Subjective:     Patient ID: Irene Lawton is a 64 y.o. female.    Chief Complaint: No chief complaint on file.    Ms Lawton is a 63 yo female sent in consultation by Dr. Mehta for evaluation for the healthy back lumbar program.  she has had low back pain for 9 days and it is improving  The pain is outer right hip dominant and some tingling.  The pain is an achy pain.  The pain is constant 5-8/10.  It is worse with lying on back, stomach and right side and trying to get out of bed, sitting and the morning. It is better standing and walking and moving and as the day goes on.  She has not had any treatment.  Her goals are to sit, sleep, and twist.  Pattern 1    X-ray hip 12/9/2024  There is no evidence of an acute fracture or dislocation of the pelvis or the right hip.  Alignment is normal.  The femoral heads are well seated in the acetabula.  Remaining osseous structures are intact.     Impression:     No acute osseous abnormality.    Past Medical History:  No date: ALLERGIC RHINITIS  No date: Anemia  No date: Anxiety  No date: Cancer      Comment:  right neck  No date: Hypertension  10/14/2023: Sprain of anterior talofibular ligament of left ankle  No date: Transient elevated blood pressure    Past Surgical History:  12/12/2020: COLONOSCOPY; N/A      Comment:  Procedure: COLONOSCOPY;  Surgeon: GHADA Gamez MD;                 Location: [address]);  Service: Endoscopy;                 Laterality: N/A;  Pt reports family Hx colon cancer (Pt's               father )prep ins. emailed - COVID screening on 12/9/20                Religion- ERW  05/12/2023: COLPORRHAPHY, COMBINED ANTEROPOSTERIOR; N/A      Comment:  Procedure: COLPORRHAPHY, COMBINED ANTEROPOSTERIOR;                 Surgeon: Lulu Parham DO;  Location: [facility] OR;                 Service: OB/GYN;  Laterality: N/A;  05/12/2023: CYSTOSCOPY      Comment:  Procedure: CYSTOSCOPY;  Surgeon: Lulu Parham DO;   Location: [facility] OR;  Service: OB/GYN;;  No date: HYSTERECTOMY  05/12/2023: LAPAROSCOPIC SUSPENSION OF UTEROSACRAL LIGAMENT; N/A      Comment:  Procedure: SUSPENSION, LIGAMENT, UTEROSACRAL,                LAPAROSCOPIC;  Surgeon: Lulu Parham DO;                 Location: [facility] OR;  Service: OB/GYN;  Laterality: N/A;  05/12/2023: LAPAROSCOPIC TOTAL HYSTERECTOMY; N/A      Comment:  Procedure: HYSTERECTOMY, TOTAL, LAPAROSCOPIC;  Surgeon:                Lulu Parham DO;  Location: [facility] OR;  Service:                OB/GYN;  Laterality: N/A;  VNOTES / 4 HOURS  05/12/2023: LYSIS OF ADHESIONS; N/A      Comment:  Procedure: LYSIS, ADHESIONS;  Surgeon: Lulu Parham DO;  Location: [facility] OR;  Service: OB/GYN;                 Laterality: N/A;  No date: SKIN BIOPSY  No date: Surgical removal neck cancer; Right  No date: TUBAL LIGATION    Review of patient's family history indicates:  Problem: Hypertension      Relation: Mother          Name: Miya              Age of Onset: (Not Specified)  Problem: Breast cancer      Relation: Mother          Name: Miya              Age of Onset: (Not Specified)  Problem: Cancer      Relation: Sister          Name: Julisa Lawton              Age of Onset: (Not Specified)  Problem: Colon cancer      Relation: Neg Hx          Name:               Age of Onset: (Not Specified)  Problem: Ovarian cancer      Relation: Neg Hx          Name:               Age of Onset: (Not Specified)      Social History    Socioeconomic History      Marital status: Single      Number of children: 2      Years of education: 14 years    Occupational History      Occupation: [employer] manager        Employer: [id number]    Tobacco Use      Smoking status: Never        Passive exposure: Never      Smokeless tobacco: Never    Substance and Sexual Activity      Alcohol use: Not Currently        Comment: Social      Drug use: No      Sexual activity: Not Currently         Partners: Male        Birth control/protection: Surgical    Social History Narrative      Works at [employer] and does not exercise regularly    Social Drivers of Health  Financial Resource Strain: Medium Risk (3/12/2024)      Overall Financial Resource Strain (CARDIA)          Difficulty of Paying Living Expenses: Somewhat hard  Food Insecurity: Food Insecurity Present (3/12/2024)      Hunger Vital Sign          Worried About Running Out of Food in the Last Year: Sometimes true          Ran Out of Food in the Last Year: Sometimes true  Transportation Needs: No Transportation Needs (3/12/2024)      PRAPARE - Transportation          Lack of Transportation (Medical): No          Lack of Transportation (Non-Medical): No  Physical Activity: Unknown (3/12/2024)      Exercise Vital Sign          Days of Exercise per Week: 1 day  Recent Concern: Physical Activity - Insufficiently Active (3/12/2024)      Exercise Vital Sign          Days of Exercise per Week: 1 day          Minutes of Exercise per Session: 20 min  Stress: Stress Concern Present (3/12/2024)      [place] [employer] - Occupational Stress Questionnaire          Feeling of Stress : To some extent  Housing Stability: High Risk (3/12/2024)      Housing Stability Vital Sign          Unable to Pay for Housing in the Last Year: Yes          Unstable Housing in the Last Year: No    Current Outpatient Medications:  estradioL (ESTRACE) 0.01 % (0.1 mg/gram) vaginal cream, Place 0.5 g vaginally twice a week. Apply to the vagina daily for two weeks then twice a week., Disp: 45 g, Rfl: 11  fluticasone propionate (FLONASE) 50 mcg/actuation nasal spray, Use 1 spray(s) in each nostril once daily, Disp: 48 g, Rfl: 3  hydroCHLOROthiazide (HYDRODIURIL) 25 MG tablet, Take 1 tablet (25 mg total) by mouth once daily., Disp: 90 tablet, Rfl: 1  HYDROcodone-acetaminophen (NORCO) 5-325 mg per tablet, Take 1 tablet by mouth every 6 (six) hours as needed for Pain.,  Disp: 6 tablet, Rfl: 0  ibuprofen (ADVIL,MOTRIN) 800 MG tablet, Take one tab po TID prn back pain, Disp: 90 tablet, Rfl: 0  methocarbamoL (ROBAXIN) 500 MG Tab, Take 2 tablets (1,000 mg total) by mouth 3 (three) times daily. for 5 days, Disp: 30 tablet, Rfl: 0  methylPREDNISolone (MEDROL DOSEPACK) 4 mg tablet, Taper as directed, Disp: 1 each, Rfl: 0  MULTIVITAMIN ORAL, Take by mouth., Disp: , Rfl:   naproxen (NAPROSYN) 500 MG tablet, Take 1 tablet (500 mg total) by mouth 2 (two) times daily with meals., Disp: 60 tablet, Rfl: 0  tobramycin sulfate 0.3% (TOBREX) 0.3 % ophthalmic solution, 1-2 drops topically twice daily to affected toe(s)., Disp: 5 mL, Rfl: 3  topiramate (TOPAMAX) 50 MG tablet, Take 1 tablet (50 mg total) by mouth 2 (two) times daily., Disp: 180 tablet, Rfl: 1    No current facility-administered medications for this visit.      Review of patient's allergies indicates:   -- No known allergies       Review of Systems   Constitutional: Negative for weight gain and weight loss.   Cardiovascular:  Negative for chest pain.   Respiratory:  Negative for shortness of breath.    Musculoskeletal:  Negative for back pain, joint pain and joint swelling.   Gastrointestinal:  Negative for abdominal pain, bowel incontinence, nausea and vomiting.   Genitourinary:  Negative for bladder incontinence.   Neurological:  Positive for paresthesias (right outer thigh). Negative for numbness.        Objective:     General: Irene is well-developed, well-nourished, appears stated age, in no acute distress, alert and oriented to time, place and person.     General    Vitals reviewed.  Constitutional: She is oriented to person, place, and time. She appears well-developed and well-nourished.   HENT:   Head: Normocephalic and atraumatic.   Pulmonary/Chest: Effort normal.   Neurological: She is alert and oriented to person, place, and time.   Psychiatric: She has a normal mood and affect. Her behavior is normal. Judgment and  thought content normal.     General Musculoskeletal Exam   Gait: normal     Right Ankle/Foot Exam     Tests   Heel Walk: able to perform  Tiptoe Walk: able to perform    Left Ankle/Foot Exam     Tests   Heel Walk: able to perform  Tiptoe Walk: able to perform      Right Hip Exam     Tenderness  Also right side trochanteric tenderness.  Back (L-Spine & T-Spine) / Neck (C-Spine) Exam     Tenderness   The patient is tender to palpation of the right side trochanteric.     Back (L-Spine & T-Spine) Range of Motion   Extension:  30   Flexion:  90   Lateral bend right:  20   Lateral bend left:  20   Rotation right:  40 (with pain in hip)   Rotation left:  40     Spinal Sensation   Right Side Sensation  C-Spine Level: normal   L-Spine Level: normal  S-Spine Level: normal  Left Side Sensation  C-Spine Level: normal  L-Spine Level: normal  S-Spine Level: normal    Back (L-Spine & T-Spine) Tests   Right Side Tests  Straight leg raise:        Sitting SLR: > 70 degrees    Left Side Tests  Straight leg raise:       Sitting SLR: > 70 degrees      Other   She has no scoliosis .  Spinal Kyphosis:  Absent    Comments:  Neg SHONNA with hip pain    Pain over TFL and trochanteric bursa      Muscle Strength   Right Upper Extremity   Biceps: 5/5   Deltoid:  5/5  Triceps:  5/5  Wrist extension: 5/5   Finger Flexors:  5/5  Left Upper Extremity  Biceps: 5/5   Deltoid:  5/5  Triceps:  5/5  Wrist extension: 5/5   Finger Flexors:  5/5  Right Lower Extremity   Hip Flexion: 5/5   Quadriceps:  5/5   Anterior tibial:  5/5   EHL:  5/5  Left Lower Extremity   Hip Flexion: 5/5   Quadriceps:  5/5   Anterior tibial:  5/5   EHL:  5/5    Reflexes     Left Side  Biceps:  2+  Triceps:  2+  Brachioradialis:  2+  Achilles:  2+  Left Marshall's Sign:  Absent  Babinski Sign:  absent  Quadriceps:  2+    Right Side   Biceps:  2+  Triceps:  2+  Brachioradialis:  2+  Achilles:  2+  Right Marshall's Sign:  absent  Babinski Sign:  absent  Quadriceps:  2+    Vascular Exam      Right Pulses        Carotid:                  2+    Left Pulses        Carotid:                  2+          Assessment:     1. Trochanteric bursitis of right hip    2. Low back pain radiating to right lower extremity         Plan:     Orders Placed This Encounter    Ambulatory Referral/Consult to Physical Therapy     We discussed back pain and the nature of back pain.  We discussed that it will likely improve and that it is not one thing that causes the pain but an accumulation of multiple things that we do.  We discussed acute pain and healthy back.  We discussed healthy back for chronic pain, and pain seems more bursitis.  We discussed a year long commitment once.  We discussed going to regular therapy and getting a good HEP to continue on own.     Pain is outer right hip and has tenderness over TFL and trochanteric bursa.  We discussed PT for bursitis and need to strengthen glutes and quads  We discussed posture sitting and the importance of trying to sit better.  We discussed sitting with a curve in lower back and taking standing breaks.  We discussed watching time bending and taking breaks  We discussed the benefits of therapy and exercise and continuing to move.  PT for glute and quad strengthening, ITB stretches and back and core exercises at [facility]  We discussed sleeping with a pillow between knees  Currently not a candidate for healthy back        Follow-up: No follow-ups on file. If there are any questions prior to this, the patient was instructed to contact the office.

## 2024-12-13 ENCOUNTER — CLINICAL SUPPORT (OUTPATIENT)
Dept: REHABILITATION | Facility: HOSPITAL | Age: 64
End: 2024-12-13
Payer: COMMERCIAL

## 2024-12-13 ENCOUNTER — CLINICAL SUPPORT (OUTPATIENT)
Dept: REHABILITATION | Facility: OTHER | Age: 64
End: 2024-12-13
Attending: EMERGENCY MEDICINE
Payer: COMMERCIAL

## 2024-12-13 DIAGNOSIS — M54.50 LOW BACK PAIN RADIATING TO RIGHT LOWER EXTREMITY: ICD-10-CM

## 2024-12-13 DIAGNOSIS — Z76.89 SEEN BY HEALTH AND WELLBEING COACH: Primary | ICD-10-CM

## 2024-12-13 DIAGNOSIS — M70.61 TROCHANTERIC BURSITIS OF RIGHT HIP: Primary | ICD-10-CM

## 2024-12-13 DIAGNOSIS — M79.604 LOW BACK PAIN RADIATING TO RIGHT LOWER EXTREMITY: ICD-10-CM

## 2024-12-13 PROCEDURE — 97750 PHYSICAL PERFORMANCE TEST: CPT | Mod: 32 | Performed by: PHYSICAL MEDICINE & REHABILITATION

## 2024-12-13 NOTE — PROGRESS NOTES
Health  met with patient to complete initial outcomes for the Healthy Back lumbar program.  Questions were reviewed with patient and discussed with Dr. Mckeon. The patient will meet with Dr. Mckeon to determine program enrollment.         12/13/2024    10:36 AM   HealthyBack Questionnaire    Location of your worst pain: Lower Back   Please select the location of any additional pain: (hold down the control key, and click to select multiple responses) Low back    Did the pain begin after an event, injury, or accident? No   How long has this pain been going on?  1 week   Please indicate how the pain is changing.  Improving   What is the WORST level of pain that you have experienced in the last week?  8   What is the LEAST level of pain that you have experienced in the past week?  5   What can you NOT do not that you used to be able to do?  sit, lay on right side   Are your limitations mainly due to your pain?  Yes   What are your additional complaints, if any? Burning   Is there ever a time during the day when your pain stops, even for a brief moment, before returning? No   Does bending forward make your typical pain worse? No   Morning: Worse during   Afternoon: Better during   Evening:  Better during   Nighttime: Better during   Standing:  Better   Lying on stomach: Worse   Lying on back: Worse   Sitting:  Worse   Walking: Better   Climbing stairs: Same   Emergency department  Yes   Health care providers (hold down the control key, and click to select multiple responses) Pain management   Investigations done (hold down the control key, and click to select multiple responses) X-ray   Procedures (hold down the control key, and click to select multiple responses) None   Have you had any surgery on your back?  No   Please shabana what you are experiencing. 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 Other   What is your work status? Employed   How did you hear about the Healthyback program?  Physician

## 2024-12-18 ENCOUNTER — TELEPHONE (OUTPATIENT)
Dept: PRIMARY CARE CLINIC | Facility: CLINIC | Age: 64
End: 2024-12-18
Payer: COMMERCIAL

## 2024-12-18 DIAGNOSIS — M54.9 BACK PAIN, UNSPECIFIED BACK LOCATION, UNSPECIFIED BACK PAIN LATERALITY, UNSPECIFIED CHRONICITY: Primary | ICD-10-CM

## 2024-12-18 NOTE — TELEPHONE ENCOUNTER
----- Message from Summer sent at 12/18/2024  7:20 AM CST -----  Regarding: refeeral  Type:  Patient Returning Call        Name of who is calling:pt         What is request in detail:pt is requesting a call back in regards to referral, pt had a appt for healthy back physical therapy but wants to switch to physical therapy on [address] and needs a new referral.        Can clinic reply by [facility]:no        [facility] [phone]

## 2024-12-23 ENCOUNTER — CLINICAL SUPPORT (OUTPATIENT)
Dept: REHABILITATION | Facility: OTHER | Age: 64
End: 2024-12-23
Attending: EMERGENCY MEDICINE
Payer: COMMERCIAL

## 2024-12-23 DIAGNOSIS — M62.81 WEAKNESS OF TRUNK MUSCULATURE: ICD-10-CM

## 2024-12-23 DIAGNOSIS — M53.86 DECREASED RANGE OF MOTION OF LUMBAR SPINE: ICD-10-CM

## 2024-12-23 DIAGNOSIS — G89.29 CHRONIC LOW BACK PAIN, UNSPECIFIED BACK PAIN LATERALITY, UNSPECIFIED WHETHER SCIATICA PRESENT: Primary | ICD-10-CM

## 2024-12-23 DIAGNOSIS — M54.9 BACK PAIN, UNSPECIFIED BACK LOCATION, UNSPECIFIED BACK PAIN LATERALITY, UNSPECIFIED CHRONICITY: ICD-10-CM

## 2024-12-23 DIAGNOSIS — M54.50 CHRONIC LOW BACK PAIN, UNSPECIFIED BACK PAIN LATERALITY, UNSPECIFIED WHETHER SCIATICA PRESENT: Primary | ICD-10-CM

## 2024-12-23 PROCEDURE — 97163 PT EVAL HIGH COMPLEX 45 MIN: CPT | Mod: PN

## 2024-12-23 PROCEDURE — 97530 THERAPEUTIC ACTIVITIES: CPT | Mod: PN

## 2024-12-23 PROCEDURE — 97140 MANUAL THERAPY 1/> REGIONS: CPT | Mod: PN

## 2024-12-23 NOTE — PLAN OF CARE
[facility]  Physical Therapy Initial Evaluation    Name: Irene Lawton  Clinic Number: [id number]    Therapy Diagnosis:   Encounter Diagnoses   Name Primary?    Chronic low back pain, unspecified back pain laterality, unspecified whether sciatica present Yes    Decreased range of motion of lumbar spine     Weakness of trunk musculature      Physician: Fabricio Mehta MD    Physician Orders: PT Eval and Treat   Medical Diagnosis from Referral: Back pain, unspecified back location, unspecified back pain laterality, unspecified chronicity [M54.9]   Evaluation Date: 12/23/2024  Authorization Period Expiration: 12/18/2025  Plan of Care Expiration: 2/30/2025  Visit # / Visits authorized: 1/ 1; future visits pending  FOTO 1st follow up:  FOTO 2nd follow up:    Time In: 4:00 pm  Time Out: 4:30 pm  Total Billable Time: 30 minutes    Precautions: Standard    Subjective   Date of onset: 1-2 months  History of current condition - Irene is a pleasant 64 year old female [employer] employee that reports a chief complaint of Right side low back and hip pain that has been present for the past 1-2 months after an episode of sharp pain around thanksgiving that limited her walking and rolling in bed and standing from a chair. She feels she is getting better but still has some pain at times with long bouts of sitting and lying on her right side.         Past Medical History:   Diagnosis Date    ALLERGIC RHINITIS     Anemia     Anxiety     Cancer     right neck    Hypertension     Sprain of anterior talofibular ligament of left ankle 10/14/2023    Transient elevated blood pressure      Irene Lawton  has a past surgical history that includes Surgical removal neck cancer (Right); Colonoscopy (N/A, 12/12/2020); Skin biopsy; Laparoscopic total hysterectomy (N/A, 05/12/2023); laparoscopic suspension of uterosacral ligament (N/A, 05/12/2023); Cystoscopy (05/12/2023); Lysis of adhesions (N/A,  05/12/2023); colporrhaphy, combined anteroposterior (N/A, 05/12/2023); Hysterectomy; and Tubal ligation.    Irene has a current medication list which includes the following prescription(s): estradiol, fluticasone propionate, hydrochlorothiazide, hydrocodone-acetaminophen, ibuprofen, methylprednisolone, multivitamin, naproxen, tobramycin sulfate 0.3%, and topiramate.    Review of patient's allergies indicates:   Allergen Reactions    No known allergies         Imaging:   FINDINGS:  There is no evidence of an acute fracture or dislocation of the pelvis or the right hip.  Alignment is normal.  The femoral heads are well seated in the acetabula.  Remaining osseous structures are intact.  Impression:  No acute osseous abnormality.    Prior Therapy: None for current complaint  Social History: Lives in [place], [place]  Occupation: [employer]  Prior Level of Function: Independent with all ADL/iADLs   Current Level of Function: Independent with all ADL/iADLs     Pain:  Current 3/10, worst 7/10, best 1/10   Location: right hip lumbar spine   Description: Aching, Dull, Tight, and Deep  Aggravating Factors: Sitting, Laying, Bending, Night Time, Morning, Extension, Flexing, Lifting, and Getting out of bed/chair  Easing Factors: pain medication, ice, lying down, and heating pad    Pts goals: improve her pain and be able to stand from a chair and move better    Objective     Posture:  lumbar spine lordosis     Gait: decreased terminal stance     Functional Movements:   Functional Squat: 5 reps, with Upper Extremity support    Thoraco-Lumbar Range of Motion:    Limitations (%) Pain Observation   Flexion 10%   YES  Hip dominant   Extension 20%   YES   Hinge L3   Left Side Bending 0% no     Right Side Bending 0% no     Left Rotation 20% YES  limited   Right Rotation 20% YES  limited       Lower Extremity Strength  Right LE  Left LE    Quadriceps: 4/5 Quadriceps: 4/5   Hamstrings: 4-/5 Hamstrings: 4-/5   Iliospoas: 4/5  Iliospoas: 4-/5   Hip extension:  4/5 Hip extension: 4/5   PGM: 3+/5 PGM: 3+/5   Hip ER:  3+/5 Hip ER: 4/5   Hip IR: 4-/5 Hip IR: 4-/5     Special Tests:   Observation/Result   Repeated Flexion -   Repeated Extension -   Sacral Rocking -   Lumbar Compression Test + hinge L3   Lumbar Protective Mechanism + delayed   Prone LE Extension + hinge     Sacroiliac Joint Screen  SIJ  Right  Left    Thigh Thrust - -   Distraction - -   Sacral Thrust - -   SIJ Mobility     Flick Test - -   Standing Flexion - -     Hip Screen  HIP Right  Left    JANEE + -   SHONNA - -   Hip Scour - -     Hip Abduction Test:   - Right 2/3  - Left 2/3     Positive finding: inability to control pelvis in the frontal plain.    0 = no signs of poor movement control  1 = minimal loss of control as noted through signs of wobble  2 = moderate loss of control with at least 2 deviations  3 = severe loss of control with at least 3 deviations and requires hand on mat to stabilize    Movement System Impairment Screen:   MSI Observation    Bent Knee Fall Out +   Prone Knee Bend -   Alternating March +   Hand Heel Rock Relief and stretch       Neural Tension Testing:   Slump: (-)  SLR: (-)    Mobility Testing:  -Shear testing:   (+) L2/L3  -Segmental mobility testing:  Limited L4/5  Reproducing familiar pain: -  -Short to long with long sitting test: - anterior rotated innominate on R/L    Palpation:   -Erector Spinae: tonic  -Multifidi activation: poor    Flexibility: +   Ely's test: R = - ; L = +   Hamstrings: R = + ; L = -   García test: R = - ; L = -      Intake Outcome Measure for FOTO Lumbar Survey    Therapist reviewed FOTO scores for Irene Lawton on 12/23/2024.   FOTO documents entered into [employer] - see Media section.    Intake Score: 67%  Predicted Score: 38%         TREATMENT   Treatment Time In: 4:10 pm  Treatment Time Out: 430 pm  Total Treatment time separate from Evaluation: 10 minutes    Irene participated in dynamic functional  "therapeutic activities to improve functional performance for 10  minutes, including:  Access Code: [email or web address]  URL: [email or web address]  Date: 12/23/2024  Prepared by: Moises Christy    Exercises  - Hooklying Single Knee to Chest Stretch  - 3-4 x daily - 5-7 x weekly - 1-3 sets - 8-10 reps - 3" hold  - 90/90 Lower Trunk Rotation  - 3-4 x daily - 5-7 x weekly - 1-3 sets - 8-10 reps - 3" hold  - Beginner Bridge  - 1-2 x daily - 5-7 x weekly - 3 sets - 12 reps - 3 seconds hold     Irene received the following manual therapy techniques: Joint mobilizations were applied to the: Lumbar spine/hip for 10 minutes, including:  Right L3/4 Gapping Mobilization grade III  Right Hip HVLA    Home Exercises and Patient Education Provided    Education provided re: prognosis, activity modification, goals for therapy, role of therapy for care, exercises/HEP    Written Home Exercises Provided: Yes.  Exercises were reviewed and Irene was able to demonstrate them prior to the end of the session.   Pt received a written copy of exercises to perform at home. Irene demonstrated good understanding of the education provided.     See EMR under patient instructions for exercises given.   Assessment   Irene is a 64 y.o. female referred to outpatient Physical Therapy with a medical diagnosis of low back pain and right hip pain. Pt presents with signs and symptoms consistent with lumbar spine extension rotation syndrome with underlying hip mobility and strength deficits.    Pt prognosis is Good.   Pt will benefit from skilled outpatient Physical Therapy to address the deficits stated above and in the chart below, provide pt/family education, and to maximize pt's level of independence.     Plan of care discussed with patient: Yes  Patient's spiritual, cultural and educational needs considered and patient is agreeable to the plan of care and goals as stated below:     Anticipated Barriers for therapy: None    Medical " Necessity is demonstrated by the following  History  Co-morbidities and personal factors that may impact the plan of care [] LOW: no personal factors / co-morbidities  [] MODERATE: 1-2 personal factors / co-morbidities  [x] HIGH: 3+ personal factors / co-morbidities    Moderate / High Support Documentation:   ALLERGIC RHINITIS    Anemia    Anxiety    Cancer    right neck   Hypertension    Sprain of anterior talofibular ligament of left ankle    Transient elevated blood pressure         Examination  Body Structures and Functions, activity limitations and participation restrictions that may impact the plan of care [] LOW: addressing 1-2 elements  [] MODERATE: 3+ elements  [x] HIGH: 4+ elements (please support below)    Moderate / High Support Documentation:   Lumbar mobility  Lumbar motor control   Hip strength  Hip mobility     Clinical Presentation [] LOW: stable  [] MODERATE: Evolving  [x] HIGH: Unstable     Decision Making/ Complexity Score: high       Goals:  Short Term Goals:  6 weeks  1.Report decreased Right low back pain  < / =  0/10  to increase tolerance for sit to stand.  2. Increase ROM by 10 degrees where limited in order to perform ADLs without difficulty.  3. Increase strength by 1/3 MMT grade in glute medius  to increase tolerance for ADL and work activities.  4. Pt to tolerate HEP to improve ROM and independence with ADL's    Long Term Goals: 12 weeks  1.Report decreased low back pain < / = 0/10  to increase tolerance for sitting and standing tolerance for ADLs.  2.Patient goal: improve her low back and hip pain and improve low back mobility.  3.Increase strength to 4+/5 in  glute medius  to increase tolerance for ADL and work activities.  4. Pt will report at 38% score on FOTO  to demonstrate increase in LE function with every day tasks.       Plan   Plan of care Certification: 12/23/2024 to 2/30/2024.    Outpatient Physical Therapy 1-2 times weekly for 12 weeks to include the following  interventions: Cervical/Lumbar Traction, Electrical Stimulation as needed, Gait Training, Manual Therapy, Moist Heat/ Ice, Neuromuscular Re-ed, Orthotic Management and Training, Patient Education, Self Care, Therapeutic Activities, and Therapeutic Exercise, Blood Flow Restriction Training, Trigger Point Dry Needling as needed.      Moises Christy PT, DPT  Board Certified Orthopaedic Clinical Specialist

## 2024-12-24 NOTE — PATIENT INSTRUCTIONS
"Access Code: [email or web address]  URL: [email or web address]  Date: 12/23/2024  Prepared by: Moises Christy    Exercises  - Hooklying Single Knee to Chest Stretch  - 3-4 x daily - 5-7 x weekly - 1-3 sets - 8-10 reps - 3" hold  - 90/90 Lower Trunk Rotation  - 3-4 x daily - 5-7 x weekly - 1-3 sets - 8-10 reps - 3" hold  - Beginner Bridge  - 1-2 x daily - 5-7 x weekly - 3 sets - 12 reps - 3 seconds hold  "

## 2024-12-30 ENCOUNTER — CLINICAL SUPPORT (OUTPATIENT)
Dept: REHABILITATION | Facility: OTHER | Age: 64
End: 2024-12-30
Payer: COMMERCIAL

## 2024-12-30 DIAGNOSIS — M62.81 WEAKNESS OF TRUNK MUSCULATURE: ICD-10-CM

## 2024-12-30 DIAGNOSIS — M54.40 LOW BACK PAIN WITH SCIATICA, SCIATICA LATERALITY UNSPECIFIED, UNSPECIFIED BACK PAIN LATERALITY, UNSPECIFIED CHRONICITY: Primary | ICD-10-CM

## 2024-12-30 DIAGNOSIS — M53.86 DECREASED RANGE OF MOTION OF LUMBAR SPINE: ICD-10-CM

## 2024-12-30 PROCEDURE — 97112 NEUROMUSCULAR REEDUCATION: CPT | Mod: PN

## 2024-12-30 PROCEDURE — 97530 THERAPEUTIC ACTIVITIES: CPT | Mod: PN

## 2024-12-30 PROCEDURE — 97140 MANUAL THERAPY 1/> REGIONS: CPT | Mod: PN

## 2024-12-30 NOTE — PROGRESS NOTES
[facility]   Physical Therapy Treatment Note      Name: Irene Lawton  Clinic Number: [id number]    Therapy Diagnosis:   Encounter Diagnoses   Name Primary?    Low back pain with sciatica, sciatica laterality unspecified, unspecified back pain laterality, unspecified chronicity Yes    Decreased range of motion of lumbar spine     Weakness of trunk musculature      Physician: Annette Holliday MD    Visit Date: 12/30/2024    Physician Orders: PT Eval and Treat   Medical Diagnosis from Referral: Back pain, unspecified back location, unspecified back pain laterality, unspecified chronicity [M54.9]   Evaluation Date: 12/23/2024  Authorization Period Expiration: 12/18/2025  Plan of Care Expiration: 2/30/2025  Visit # / Visits authorized: 1/ 20  FOTO 1st follow up:  FOTO 2nd follow up:    PTA Visit #: 0/5     Precautions: Standard    Time In: 4:00p  Time Out: 4:56 p  Total Appointment Time: 56 minutes    Subjective     Patient reports: that she feels better today than first visit.  She was compliant with home exercise program.  Response to previous treatment: first follow up  Functional change: ongoing    Pain:  Current 3/10, worst 7/10, best 1/10   Location: right hip lumbar spine   Description: Aching, Dull, Tight, and Deep  Aggravating Factors: Sitting, Laying, Bending, Night Time, Morning, Extension, Flexing, Lifting, and Getting out of bed/chair  Easing Factors: pain medication, ice, lying down, and heating pad     Pts goals: improve her pain and be able to stand from a chair and move better    Objective    Asterisk Signs:   Posture:  lumbar spine lordosis      Gait: decreased terminal stance      Functional Movements:   Functional Squat: 5 reps, with Upper Extremity support     Thoraco-Lumbar Range of Motion:     Limitations (%) Pain Observation   Flexion 10%    YES  Hip dominant   Extension 20%    YES   Hinge L3   Left Side Bending 0% no      Right Side Bending 0% no      Left  Rotation 20% YES  limited   Right Rotation 20% YES  limited         Lower Extremity Strength  Right LE   Left LE     Quadriceps: 4/5 Quadriceps: 4/5   Hamstrings: 4-/5 Hamstrings: 4-/5   Iliospoas: 4/5 Iliospoas: 4-/5   Hip extension:  4/5 Hip extension: 4/5   PGM: 3+/5 PGM: 3+/5   Hip ER:  3+/5 Hip ER: 4/5   Hip IR: 4-/5 Hip IR: 4-/5      Special Tests:    Observation/Result   Repeated Flexion -   Repeated Extension -   Sacral Rocking -   Lumbar Compression Test + hinge L3   Lumbar Protective Mechanism + delayed   Prone LE Extension + hinge      Sacroiliac Joint Screen  SIJ  Right  Left    Thigh Thrust - -   Distraction - -   Sacral Thrust - -   SIJ Mobility       Flick Test - -   Standing Flexion - -      Hip Screen  HIP Right  Left    JANEE + -   SHONNA - -   Hip Scour - -      Hip Abduction Test:   - Right 2/3  - Left 2/3      Positive finding: inability to control pelvis in the frontal plain.    0 = no signs of poor movement control  1 = minimal loss of control as noted through signs of wobble  2 = moderate loss of control with at least 2 deviations  3 = severe loss of control with at least 3 deviations and requires hand on mat to stabilize     Movement System Impairment Screen:   MSI Observation    Bent Knee Fall Out +   Prone Knee Bend -   Alternating March +   Hand Heel Rock Relief and stretch       Neural Tension Testing:   Slump: (-)  SLR: (-)     Mobility Testing:  -Shear testing:   (+) L2/L3  -Segmental mobility testing:  Limited L4/5  Reproducing familiar pain: -  -Short to long with long sitting test: - anterior rotated innominate on R/L     Palpation:   -Erector Spinae: tonic  -Multifidi activation: poor     Flexibility: +              Ely's test: R = - ; L = +              Hamstrings: R = + ; L = -              García test: R = - ; L = -    Objective Measures updated at progress report unless specified.     Treatment     Charlotte received the treatments listed below:      manual therapy techniques:  "Joint mobilizations were applied to the: Lumbar spine/hips for 10 minutes, including:  Right L3/4 Gapping Mobilization grade III  Right Hip HVLA    neuromuscular re-education activities to improve: Balance, Coordination, Kinesthetic, Sense, Proprioception, and Posture for 23 minutes. The following activities were included:  Hooklying Single Knee to Chest Stretch 10 x 5" hold each Lower Extremity   90/90 Lower Trunk Rotation 3'   Beginner Bridge 3 x 10 reps     therapeutic activities to improve functional performance for 23  minutes, including:  Re-assessment of hip and HEP Education  Plinth Squats 2 x 10 reps with GTB   Step Ups 6" Box 2 x 10 reps   Standing Hip Abduction/Extension with Yellow Band 20x's each       Patient Education and Home Exercises       Education provided:   - Patient was provided education on for continued compliance with HEP for continued functional mobility and strength gains for return to prior level of function.      Written Home Exercises Provided: Patient instructed to cont prior HEP. Exercises were reviewed and Irene was able to demonstrate them prior to the end of the session.  Irene demonstrated good  understanding of the education provided. See Electronic Medical Record under Patient Instructions for exercises provided during therapy sessions    Assessment   Patient presents to the clinic with mild symptom irritability pre-session and minimal irritability post-session. Manual therapy was performed to Right hip to improve joint play and allow for optimal movement during corrective exercises.   Patient demonstrates right hip mobility deficits that are improving.   Patient demonstrated improvements in hip mobility and motor control within session.     Patient will continue to benefit from skilled outpatient physical therapy to address the deficits listed in the problem list box on initial evaluation, provide patient/family education and to maximize patient's level of " independence in the home and community environment.     Irene Is progressing well towards her goals.   Patient prognosis is Good.     Patient's spiritual, cultural and educational needs considered and pt agreeable to plan of care and goals.     Anticipated barriers to physical therapy: None    Goals:   Short Term Goals:  6 weeks  1.Report decreased Right low back pain  < / =  0/10  to increase tolerance for sit to stand.  2. Increase ROM by 10 degrees where limited in order to perform ADLs without difficulty.  3. Increase strength by 1/3 MMT grade in glute medius  to increase tolerance for ADL and work activities.  4. Pt to tolerate HEP to improve ROM and independence with ADL's     Long Term Goals: 12 weeks  1.Report decreased low back pain < / = 0/10  to increase tolerance for sitting and standing tolerance for ADLs.  2.Patient goal: improve her low back and hip pain and improve low back mobility.  3.Increase strength to 4+/5 in  glute medius  to increase tolerance for ADL and work activities.  4. Pt will report at 38% score on FOTO  to demonstrate increase in LE function with every day tasks.         Plan   Plan of care Certification: 12/23/2024 to 2/30/2024.     Outpatient Physical Therapy 1-2 times weekly for 12 weeks to include the following interventions: Cervical/Lumbar Traction, Electrical Stimulation as needed, Gait Training, Manual Therapy, Moist Heat/ Ice, Neuromuscular Re-ed, Orthotic Management and Training, Patient Education, Self Care, Therapeutic Activities, and Therapeutic Exercise, Blood Flow Restriction Training, Trigger Point Dry Needling as needed.       Moises Christy PT, DPT  Board Certified Orthopaedic Clinical Specialist

## 2025-01-13 NOTE — PROGRESS NOTES
[facility]   Physical Therapy Treatment Note      Name: Irene Lawton  Clinic Number: [id number]    Therapy Diagnosis:   Encounter Diagnoses   Name Primary?    Low back pain with sciatica, sciatica laterality unspecified, unspecified back pain laterality, unspecified chronicity Yes    Decreased range of motion of lumbar spine     Weakness of trunk musculature        Physician: Annette Holliday MD    Visit Date: 1/14/2025    Physician Orders: PT Eval and Treat   Medical Diagnosis from Referral: Back pain, unspecified back location, unspecified back pain laterality, unspecified chronicity [M54.9]   Evaluation Date: 12/23/2024  Authorization Period Expiration: 12/18/2025  Plan of Care Expiration: 2/30/2025  Visit # / Visits authorized: 1 / 20  FOTO 1st follow up:  FOTO 2nd follow up:    PTA Visit #: 0/5     Precautions: Standard    Time In: 3:50 p  Time Out: 4:46 p  Total Appointment Time: 56 minutes    Subjective     Patient reports: that she is feeling better. Changed her desk set up and supports her Lower Extremity with relief.  She was compliant with home exercise program.  Response to previous treatment:improved hip and back pain  Functional change: ongoing    Pain:  Current 3/10, worst 7/10, best 1/10   Location: right hip lumbar spine   Description: Aching, Dull, Tight, and Deep  Aggravating Factors: Sitting, Laying, Bending, Night Time, Morning, Extension, Flexing, Lifting, and Getting out of bed/chair  Easing Factors: pain medication, ice, lying down, and heating pad     Pts goals: improve her pain and be able to stand from a chair and move better    Objective    Asterisk Signs:   Posture:  lumbar spine lordosis      Gait: decreased terminal stance      Functional Movements:   Functional Squat: 5 reps, with Upper Extremity support     Thoraco-Lumbar Range of Motion:     Limitations (%) Pain Observation   Flexion 10%    YES  Hip dominant   Extension 20%    YES   Hinge L3   Left  Side Bending 0% no      Right Side Bending 0% no      Left Rotation 20% YES  limited   Right Rotation 20% YES  limited         Lower Extremity Strength  Right LE   Left LE     Quadriceps: 4/5 Quadriceps: 4/5   Hamstrings: 4-/5 Hamstrings: 4-/5   Iliospoas: 4/5 Iliospoas: 4-/5   Hip extension:  4/5 Hip extension: 4/5   PGM: 3+/5 PGM: 3+/5   Hip ER:  3+/5 Hip ER: 4/5   Hip IR: 4-/5 Hip IR: 4-/5      Special Tests:    Observation/Result   Repeated Flexion -   Repeated Extension -   Sacral Rocking -   Lumbar Compression Test + hinge L3   Lumbar Protective Mechanism + delayed   Prone LE Extension + hinge      Sacroiliac Joint Screen  SIJ  Right  Left    Thigh Thrust - -   Distraction - -   Sacral Thrust - -   SIJ Mobility       Flick Test - -   Standing Flexion - -      Hip Screen  HIP Right  Left    JANEE + -   SHONNA - -   Hip Scour - -      Hip Abduction Test:   - Right 2/3  - Left 2/3      Positive finding: inability to control pelvis in the frontal plain.    0 = no signs of poor movement control  1 = minimal loss of control as noted through signs of wobble  2 = moderate loss of control with at least 2 deviations  3 = severe loss of control with at least 3 deviations and requires hand on mat to stabilize     Movement System Impairment Screen:   MSI Observation    Bent Knee Fall Out +   Prone Knee Bend -   Alternating March +   Hand Heel Rock Relief and stretch       Neural Tension Testing:   Slump: (-)  SLR: (-)     Mobility Testing:  -Shear testing:   (+) L2/L3  -Segmental mobility testing:  Limited L4/5  Reproducing familiar pain: -  -Short to long with long sitting test: - anterior rotated innominate on R/L     Palpation:   -Erector Spinae: tonic  -Multifidi activation: poor     Flexibility: +              Ely's test: R = - ; L = +              Hamstrings: R = + ; L = -              García test: R = - ; L = -    Objective Measures updated at progress report unless specified.     Treatment     Irene received  "the treatments listed below:      manual therapy techniques: Joint mobilizations were applied to the: Lumbar spine/hips for 10 minutes, including:  Right L3/4 Gapping Mobilization grade III  Right Hip HVLA    neuromuscular re-education activities to improve: Balance, Coordination, Kinesthetic, Sense, Proprioception, and Posture for 23 minutes. The following activities were included:  Hooklying Single Knee to Chest Stretch 10 x 5" hold each Lower Extremity   90/90 Lower Trunk Rotation 3'   Beginner Bridge 3 x 10 reps   Sidelying Hip Abduction 2 x 10 reps each     therapeutic activities to improve functional performance for 23  minutes, including:  Re-assessment of hip and HEP Education  Plinth Squats 2 x 10 reps with GTB   Step Ups 6" Box 2 x 10 reps   Standing Hip Abduction/Extension with Yellow Band 20x's each       Patient Education and Home Exercises       Education provided:   - Patient was provided education on for continued compliance with HEP for continued functional mobility and strength gains for return to prior level of function.      Written Home Exercises Provided: Patient instructed to cont prior HEP. Exercises were reviewed and Irene was able to demonstrate them prior to the end of the session.  Irene demonstrated good  understanding of the education provided. See Electronic Medical Record under Patient Instructions for exercises provided during therapy sessions    Assessment   Patient presents to the clinic with mild symptom irritability pre-session and minimal irritability post-session. Manual therapy was performed to Right hip to improve joint play and allow for optimal movement during corrective exercises.   Patient demonstrates right hip mobility deficits that are improving.   Patient demonstrated improvements in hip mobility and motor control within session.   Patient will continue to benefit from skilled outpatient physical therapy to address the deficits listed in the problem list box " on initial evaluation, provide patient/family education and to maximize patient's level of independence in the home and community environment.     Irene Is progressing well towards her goals.   Patient prognosis is Good.     Patient's spiritual, cultural and educational needs considered and pt agreeable to plan of care and goals.     Anticipated barriers to physical therapy: None    Goals:   Short Term Goals:  6 weeks  1.Report decreased Right low back pain  < / =  0/10  to increase tolerance for sit to stand. MET  2. Increase ROM by 10 degrees where limited in order to perform ADLs without difficulty.Progressing  3. Increase strength by 1/3 MMT grade in glute medius  to increase tolerance for ADL and work activities.Progressing   4. Pt to tolerate HEP to improve ROM and independence with ADL's MET     Long Term Goals: 12 weeks  1.Report decreased low back pain < / = 0/10  to increase tolerance for sitting and standing tolerance for ADLs.  2.Patient goal: improve her low back and hip pain and improve low back mobility.  3.Increase strength to 4+/5 in  glute medius  to increase tolerance for ADL and work activities.  4. Pt will report at 38% score on FOTO  to demonstrate increase in LE function with every day tasks.         Plan   Plan of care Certification: 12/23/2024 to 2/30/2024.     Outpatient Physical Therapy 1-2 times weekly for 12 weeks to include the following interventions: Cervical/Lumbar Traction, Electrical Stimulation as needed, Gait Training, Manual Therapy, Moist Heat/ Ice, Neuromuscular Re-ed, Orthotic Management and Training, Patient Education, Self Care, Therapeutic Activities, and Therapeutic Exercise, Blood Flow Restriction Training, Trigger Point Dry Needling as needed.       Moises Christy PT, DPT  Board Certified Orthopaedic Clinical Specialist

## 2025-01-14 ENCOUNTER — CLINICAL SUPPORT (OUTPATIENT)
Dept: REHABILITATION | Facility: OTHER | Age: 65
End: 2025-01-14
Payer: COMMERCIAL

## 2025-01-14 DIAGNOSIS — M54.40 LOW BACK PAIN WITH SCIATICA, SCIATICA LATERALITY UNSPECIFIED, UNSPECIFIED BACK PAIN LATERALITY, UNSPECIFIED CHRONICITY: Primary | ICD-10-CM

## 2025-01-14 DIAGNOSIS — M62.81 WEAKNESS OF TRUNK MUSCULATURE: ICD-10-CM

## 2025-01-14 DIAGNOSIS — M54.9 BACK PAIN, UNSPECIFIED BACK LOCATION, UNSPECIFIED BACK PAIN LATERALITY, UNSPECIFIED CHRONICITY: ICD-10-CM

## 2025-01-14 DIAGNOSIS — M53.86 DECREASED RANGE OF MOTION OF LUMBAR SPINE: ICD-10-CM

## 2025-01-14 PROCEDURE — 97530 THERAPEUTIC ACTIVITIES: CPT | Mod: PN

## 2025-01-14 PROCEDURE — 97140 MANUAL THERAPY 1/> REGIONS: CPT | Mod: PN

## 2025-01-14 PROCEDURE — 97112 NEUROMUSCULAR REEDUCATION: CPT | Mod: PN

## 2025-01-14 RX ORDER — IBUPROFEN 800 MG/1
TABLET ORAL
Qty: 90 TABLET | Refills: 0 | Status: SHIPPED | OUTPATIENT
Start: 2025-01-14

## 2025-01-14 NOTE — TELEPHONE ENCOUNTER
No care due was identified.  [facility] Embedded Care Due Messages. Reference number: [id number].   1/14/2025 12:18:13 PM CST

## 2025-01-14 NOTE — TELEPHONE ENCOUNTER
Refill Encounter    PCP Visits: Recent Visits  Date Type Provider Dept   12/11/24 Office Visit Annette Holliday MD [facility]   07/08/24 Office Visit Annette Holliday MD [facility]   06/25/24 Office Visit Annette Holliday MD [facility]   04/30/24 Office Visit Annette Holliday MD [facility]   03/18/24 Office Visit Annette Holliday MD [facility]   Showing recent visits within past 360 days and meeting all other requirements  Future Appointments  No visits were found meeting these conditions.  Showing future appointments within next 720 days and meeting all other requirements     Last 3 Blood Pressure:   BP Readings from Last 3 Encounters:   12/09/24 (!) 197/83   06/25/24 135/76   06/17/24 122/74     Preferred Pharmacy:   [id number]  [place] - [address] [place] [address]  Phone: [phone] Fax: [phone]      Requested RX:  Requested Prescriptions     Pending Prescriptions Disp Refills    ibuprofen (ADVIL,MOTRIN) 800 MG tablet [Pharmacy Med Name: Ibuprofen 800 MG Oral Tablet] 90 tablet 0     Sig: TAKE 1 TABLET BY MOUTH THREE TIMES DAILY AS NEEDED FOR BACK PAIN      RX Route: Normal

## 2025-01-22 ENCOUNTER — PATIENT MESSAGE (OUTPATIENT)
Dept: REHABILITATION | Facility: OTHER | Age: 65
End: 2025-01-22
Payer: COMMERCIAL

## 2025-05-28 ENCOUNTER — PATIENT OUTREACH (OUTPATIENT)
Dept: OTHER | Facility: OTHER | Age: 65
End: 2025-05-28
Payer: COMMERCIAL

## 2025-05-28 ENCOUNTER — PATIENT MESSAGE (OUTPATIENT)
Dept: OTHER | Facility: OTHER | Age: 65
End: 2025-05-28
Payer: COMMERCIAL

## 2025-06-02 RX ORDER — HYDROCHLOROTHIAZIDE 25 MG/1
25 TABLET ORAL
Qty: 90 TABLET | Refills: 0 | Status: SHIPPED | OUTPATIENT
Start: 2025-06-02

## 2025-06-03 ENCOUNTER — PATIENT OUTREACH (OUTPATIENT)
Dept: OTHER | Facility: OTHER | Age: 65
End: 2025-06-03
Payer: COMMERCIAL

## 2025-06-04 ENCOUNTER — OFFICE VISIT (OUTPATIENT)
Dept: OBSTETRICS AND GYNECOLOGY | Facility: CLINIC | Age: 65
End: 2025-06-04
Payer: COMMERCIAL

## 2025-06-04 VITALS
BODY MASS INDEX: 32.57 KG/M2 | WEIGHT: 183.88 LBS | DIASTOLIC BLOOD PRESSURE: 88 MMHG | SYSTOLIC BLOOD PRESSURE: 168 MMHG

## 2025-06-04 DIAGNOSIS — Z90.710 S/P VAGINAL HYSTERECTOMY: ICD-10-CM

## 2025-06-04 DIAGNOSIS — Z01.419 ENCOUNTER FOR GYNECOLOGICAL EXAMINATION WITHOUT ABNORMAL FINDING: Primary | ICD-10-CM

## 2025-06-04 DIAGNOSIS — I10 PRIMARY HYPERTENSION: ICD-10-CM

## 2025-06-04 PROCEDURE — 1159F MED LIST DOCD IN RCRD: CPT | Mod: CPTII,S$GLB,, | Performed by: OBSTETRICS & GYNECOLOGY

## 2025-06-04 PROCEDURE — 3008F BODY MASS INDEX DOCD: CPT | Mod: CPTII,S$GLB,, | Performed by: OBSTETRICS & GYNECOLOGY

## 2025-06-04 PROCEDURE — 1101F PT FALLS ASSESS-DOCD LE1/YR: CPT | Mod: CPTII,S$GLB,, | Performed by: OBSTETRICS & GYNECOLOGY

## 2025-06-04 PROCEDURE — 3077F SYST BP >= 140 MM HG: CPT | Mod: CPTII,S$GLB,, | Performed by: OBSTETRICS & GYNECOLOGY

## 2025-06-04 PROCEDURE — 99999 PR PBB SHADOW E&M-EST. PATIENT-LVL III: CPT | Mod: PBBFAC,,, | Performed by: OBSTETRICS & GYNECOLOGY

## 2025-06-04 PROCEDURE — 3079F DIAST BP 80-89 MM HG: CPT | Mod: CPTII,S$GLB,, | Performed by: OBSTETRICS & GYNECOLOGY

## 2025-06-04 PROCEDURE — 1160F RVW MEDS BY RX/DR IN RCRD: CPT | Mod: CPTII,S$GLB,, | Performed by: OBSTETRICS & GYNECOLOGY

## 2025-06-04 PROCEDURE — 3288F FALL RISK ASSESSMENT DOCD: CPT | Mod: CPTII,S$GLB,, | Performed by: OBSTETRICS & GYNECOLOGY

## 2025-06-04 PROCEDURE — 99397 PER PM REEVAL EST PAT 65+ YR: CPT | Mod: S$GLB,,, | Performed by: OBSTETRICS & GYNECOLOGY

## 2025-06-04 NOTE — PROGRESS NOTES
HISTORY OF PRESENT ILLNESS:    Irene Lawton is a 65 y.o. female,   presents today for her routine visit and has no complaints.      Back pain on Advil     Elevated BP - No CP or SOB, precaution given.   Patient to see PCP next week     She is menopausal  Denies vaginal itching or irritation.  Denies vaginal discharge.  She is not sexually active     Last MM  Last Colonoscopy:    Past Medical History:   Diagnosis Date    ALLERGIC RHINITIS     Anemia     Anxiety     Cancer     right neck    Hypertension     Sprain of anterior talofibular ligament of left ankle 10/14/2023    Transient elevated blood pressure        Past Surgical History:   Procedure Laterality Date    COLONOSCOPY N/A 2020    Procedure: COLONOSCOPY;  Surgeon: GHADA Gamez MD;  Location: [facility] ([address] [place] [address]);  Service: Endoscopy;  Laterality: N/A;  Pt reports family Hx colon cancer (Pt's father )  prep ins. emailed - COVID screening on 20 [facility]- [facility]    COLPORRHAPHY, COMBINED ANTEROPOSTERIOR N/A 2023    Procedure: COLPORRHAPHY, COMBINED ANTEROPOSTERIOR;  Surgeon: Lulu Parham DO;  Location: [facility];  Service: OB/GYN;  Laterality: N/A;    CYSTOSCOPY  2023    Procedure: CYSTOSCOPY;  Surgeon: Lulu Parham DO;  Location: [facility];  Service: OB/GYN;;    HYSTERECTOMY      LAPAROSCOPIC SUSPENSION OF UTEROSACRAL LIGAMENT N/A 2023    Procedure: SUSPENSION, LIGAMENT, UTEROSACRAL, LAPAROSCOPIC;  Surgeon: Lulu Parham DO;  Location: [facility];  Service: OB/GYN;  Laterality: N/A;    LAPAROSCOPIC TOTAL HYSTERECTOMY N/A 2023    Procedure: HYSTERECTOMY, TOTAL, LAPAROSCOPIC;  Surgeon: Lulu Parham DO;  Location: [facility];  Service: OB/GYN;  Laterality: N/A;  VNOTES / 4 HOURS    LYSIS OF ADHESIONS N/A 2023    Procedure: LYSIS, ADHESIONS;  Surgeon: Lulu Parham DO;  Location: [facility] OR;  Service: OB/GYN;  Laterality: N/A;    SKIN BIOPSY      Surgical removal  neck cancer Right     TUBAL LIGATION         MEDICATIONS AND ALLERGIES:    Current Medications[1]    Review of patient's allergies indicates:   Allergen Reactions    No known allergies        Family History   Problem Relation Name Age of Onset    Hypertension Mother Miya     Breast cancer Mother Miya     Cancer Sister Julisa Lawton     Colon cancer Neg Hx      Ovarian cancer Neg Hx         Social History[2]    COMPREHENSIVE GYN HISTORY:  PAP History: Denies abnormal Paps.  Infection History: Denies STDs. Denies PID.  Benign History: Denies uterine fibroids. Denies ovarian cysts. Denies endometriosis. Denies other conditions.  Cancer History: Denies cervical cancer. Denies uterine cancer or hyperplasia. Denies ovarian cancer. Denies vulvar cancer or pre-cancer. Denies vaginal cancer or pre-cancer. Denies breast cancer. Denies colon cancer.  Sexual Activity History: Reports currently being sexually active  Menstrual History: Denies menses. Pt is  not on ERT.     ROS:  GENERAL: No weight changes. No swelling. No fatigue. No fever.  CARDIOVASCULAR: No chest pain. No shortness of breath. No leg cramps.   NEUROLOGICAL: No headaches. No vision changes.  BREASTS: No pain. No lumps. No discharge.  ABDOMEN: No pain. No nausea. No vomiting. No diarrhea. No constipation.  REPRODUCTIVE: No abnormal bleeding.  VULVA: No pain. No lesions. No itching.  VAGINA: No relaxation. No itching. No odor. No discharge. No lesions.  URINARY: No incontinence. No nocturia. No frequency. No dysuria.    BP (!) 168/88   Wt 83.4 kg (183 lb 13.8 oz)   LMP 06/27/2012   BMI 32.57 kg/m²     PE:  APPEARANCE: Well nourished, well developed, in no acute distress.  AFFECT: WNL, alert and oriented x 3.  SKIN: No acne or hirsutism.  NECK: Neck symmetric without masses or thyromegaly.  NODES: No inguinal, cervical, axillary or femoral lymph node enlargement.  CHEST: Good respiratory effort.   ABDOMEN: Soft. No tenderness or masses. No  hepatosplenomegaly. No hernias.  BREASTS: Symmetrical, no skin changes or visible lesions. No palpable masses, nipple discharge bilaterally.  PELVIC: ATROPHIC EXTERNAL FEMALE GENITALIA without lesions. Normal hair distribution. Adequate perineal body, normal urethral meatus. VAGINA DRY without lesions or discharge. No significant cystocele or rectocele. Bimanual exam shows CERVIX and UTERUS to be SURGICALLY ABSENT. Adnexa without masses or tenderness.  EXTREMITIES: No edema.    DIAGNOSIS:  1. Encounter for gynecological examination without abnormal finding    2. s/p Vnote/USLS/A&P repair/cyst        COUNSELING:  The patient was counseled today on  -osteoporosis prevention, calcium supplementation, regular weight bearing exercise.  -ACS PAP guidelines (no paps), with recommendations for yearly pelvic exams as her uterus and cervix were removed for benign reasons and ovaries remain;  -recommendation for yearly mammogram;  -to see her primary care physician for all other health maintenance.    FOLLOW-UP with me for next routine visit.     As of April 1, 2021, the Cures Act has been passed nationally. This new law requires that all doctors progress notes, lab results, pathology reports and radiology reports be released IMMEDIATELY to the patient in the patient portal. That means that the results are released to you at the EXACT same time they are released to me. Therefore, with all of the patients that I have I am not able to reply to each patient exactly when the results come in. So there will be a delay from when you see the results to when I see them and have time to come up with a response to send you. Also I only see these results when I am on the computer at work. So if the results come in over the weekend or after 5 pm of a work day, I will not see them until the next business day. As you can tell, this is a challenge as a physician to give every patient the quick response they hope for and deserve. So please be  patient!   Thanks for your understanding and patience.         [1]   Current Outpatient Medications:     fluticasone propionate (FLONASE) 50 mcg/actuation nasal spray, Use 1 spray(s) in each nostril once daily, Disp: 32 g, Rfl: 1    hydroCHLOROthiazide (HYDRODIURIL) 25 MG tablet, Take 1 tablet by mouth once daily, Disp: 90 tablet, Rfl: 0    ibuprofen (ADVIL,MOTRIN) 800 MG tablet, TAKE 1 TABLET BY MOUTH THREE TIMES DAILY AS NEEDED FOR BACK PAIN, Disp: 90 tablet, Rfl: 0    MULTIVITAMIN ORAL, Take by mouth., Disp: , Rfl:     estradioL (ESTRACE) 0.01 % (0.1 mg/gram) vaginal cream, Place 0.5 g vaginally twice a week. Apply to the vagina daily for two weeks then twice a week. (Patient not taking: Reported on 6/4/2025), Disp: 45 g, Rfl: 11  [2]   Social History  Socioeconomic History    Marital status: Single    Number of children: 2    Years of education: 14 years   Occupational History    Occupation: [employer] manager     Employer: [id number]   Tobacco Use    Smoking status: Never     Passive exposure: Never    Smokeless tobacco: Never   Substance and Sexual Activity    Alcohol use: Not Currently     Comment: Social    Drug use: No    Sexual activity: Not Currently     Partners: Male     Birth control/protection: Surgical   Social History Narrative    Works at [employer] and does not exercise regularly     Social Drivers of Health     Financial Resource Strain: Medium Risk (5/27/2025)    Overall Financial Resource Strain (CARDIA)     Difficulty of Paying Living Expenses: Somewhat hard   Food Insecurity: Food Insecurity Present (5/27/2025)    Hunger Vital Sign     Worried About Running Out of Food in the Last Year: Sometimes true     Ran Out of Food in the Last Year: Sometimes true   Transportation Needs: No Transportation Needs (5/27/2025)    PRAPARE - Transportation     Lack of Transportation (Medical): No     Lack of Transportation (Non-Medical): No   Physical Activity: Unknown (5/27/2025)    Exercise Vital Sign      Days of Exercise per Week: 1 day   Stress: No Stress Concern Present (5/27/2025)    [employer] [place] [employer] - Occupational Stress Questionnaire     Feeling of Stress : Only a little   Housing Stability: High Risk (5/27/2025)    Housing Stability Vital Sign     Unable to Pay for Housing in the Last Year: Yes     Homeless in the Last Year: No

## 2025-06-09 ENCOUNTER — PATIENT OUTREACH (OUTPATIENT)
Dept: OTHER | Facility: OTHER | Age: 65
End: 2025-06-09
Payer: COMMERCIAL

## 2025-06-10 ENCOUNTER — PATIENT MESSAGE (OUTPATIENT)
Dept: OTHER | Facility: OTHER | Age: 65
End: 2025-06-10
Payer: COMMERCIAL

## 2025-06-19 ENCOUNTER — PATIENT MESSAGE (OUTPATIENT)
Dept: ADMINISTRATIVE | Facility: OTHER | Age: 65
End: 2025-06-19
Payer: COMMERCIAL

## 2025-06-19 ENCOUNTER — PATIENT OUTREACH (OUTPATIENT)
Dept: OTHER | Facility: OTHER | Age: 65
End: 2025-06-19
Payer: COMMERCIAL

## 2025-06-23 ENCOUNTER — PATIENT MESSAGE (OUTPATIENT)
Dept: INTERNAL MEDICINE | Facility: CLINIC | Age: 65
End: 2025-06-23

## 2025-06-23 ENCOUNTER — OFFICE VISIT (OUTPATIENT)
Dept: INTERNAL MEDICINE | Facility: CLINIC | Age: 65
End: 2025-06-23
Payer: COMMERCIAL

## 2025-06-23 VITALS
HEART RATE: 56 BPM | WEIGHT: 185.44 LBS | BODY MASS INDEX: 32.86 KG/M2 | DIASTOLIC BLOOD PRESSURE: 88 MMHG | OXYGEN SATURATION: 97 % | SYSTOLIC BLOOD PRESSURE: 142 MMHG | HEIGHT: 63 IN

## 2025-06-23 DIAGNOSIS — I10 PRIMARY HYPERTENSION: ICD-10-CM

## 2025-06-23 DIAGNOSIS — H01.011 ULCERATIVE BLEPHARITIS OF RIGHT UPPER EYELID: Primary | ICD-10-CM

## 2025-06-23 PROCEDURE — 3008F BODY MASS INDEX DOCD: CPT | Mod: CPTII,S$GLB,,

## 2025-06-23 PROCEDURE — 3288F FALL RISK ASSESSMENT DOCD: CPT | Mod: CPTII,S$GLB,,

## 2025-06-23 PROCEDURE — 1160F RVW MEDS BY RX/DR IN RCRD: CPT | Mod: CPTII,S$GLB,,

## 2025-06-23 PROCEDURE — 3077F SYST BP >= 140 MM HG: CPT | Mod: CPTII,S$GLB,,

## 2025-06-23 PROCEDURE — 99999 PR PBB SHADOW E&M-EST. PATIENT-LVL IV: CPT | Mod: PBBFAC,,,

## 2025-06-23 PROCEDURE — 1159F MED LIST DOCD IN RCRD: CPT | Mod: CPTII,S$GLB,,

## 2025-06-23 PROCEDURE — 3079F DIAST BP 80-89 MM HG: CPT | Mod: CPTII,S$GLB,,

## 2025-06-23 PROCEDURE — 1101F PT FALLS ASSESS-DOCD LE1/YR: CPT | Mod: CPTII,S$GLB,,

## 2025-06-23 PROCEDURE — 99214 OFFICE O/P EST MOD 30 MIN: CPT | Mod: S$GLB,,,

## 2025-06-23 RX ORDER — ERYTHROMYCIN 5 MG/G
OINTMENT OPHTHALMIC
Qty: 3.5 G | Refills: 0 | Status: SHIPPED | OUTPATIENT
Start: 2025-06-23

## 2025-06-23 NOTE — PATIENT INSTRUCTIONS
Great meeting you,     Just to review:    -continue washing lid with baby soap  -good hand hygiene  -use nightly erythromycin ointment nightly x 2 weeks, if symptoms progress over next 24-48 hours I will consider oral antibiotics  -continue with warm/cold compresses

## 2025-06-23 NOTE — LETTER
June 23, 2025      [facility]  [address]  [facility] [address]  Phone: [phone]  Fax: [phone]       Patient: Irene Lawton   YOB: 1960  Date of Visit: 06/23/2025    To Whom It May Concern:    Elinor Lawton  was at [facility] on 06/23/2025. The patient may return to work/school on 6/25/2025 with no restrictions. If you have any questions or concerns, or if I can be of further assistance, please do not hesitate to contact me.    Sincerely,    Zenobia Mendez NP

## 2025-06-23 NOTE — PROGRESS NOTES
HPI     Chief Complaint:  Chief Complaint   Patient presents with    Stye     Right eye; pt using OTC gel for stye       Irene Lawton is a 65 y.o. female with multiple medical diagnoses as listed in the medical history and problem list that presents for   Chief Complaint   Patient presents with    Stye     Right eye; pt using OTC gel for stye   .   Patient is not known to me.      HPI    I attest that I have reviewed the student note and that the components of the history of the present illness, the physical exam, and the assessment and plan documented were performed by me or were performed in my presence by the student where I verified the documentation and performed (or re-performed) the exam and medical decision making.    PT reports woke up saturday evening with itching to upper lip. Woke up with mild swelling to upper lip but very minimal. No discharge. Pain/swelling to upper lip progressively worse since then and even since this morning. Mild tenderness to mucous membrane to inner lid right eye. No excessive tearing or discharge noted on exam but multiped darkened ulcer to anterior aspect upper lid. No ocular pain with movement. Only slightly obstruction of vision due to upper lid swelling. Associated itching.     Wears glasses  No other URI symptoms or sick contacts    Seen a few years ago for style and give bacitracin ointment without much improved, then followed-up with Dr. Navarrete (opth) and eventually went away. Pt reports this presentation is different.   Assessment & Plan         1. Ulcerative blepharitis of right upper eyelid  -topical abx  -warm/cool compresses  Monitor for worsening s/s  Consider oral abx or imaging if swelling/redness/pain progresses over the next 24/48 hours  -lid hygiene with baby soap  -hand hygiene  -     erythromycin (ROMYCIN) ophthalmic ointment; Apply 1/4 inch ointment directly onto the lid margin of affected eye once daily at bedtime for two weeks  Dispense: 3.5 g;  Refill: 0    2. Primary hypertension  Assessment & Plan:  Initial reading elevated but repeat improved.   Digital medicine  Compliant with meds  -hctz       6/9/2025 6/6/2025 5/29/2025 5/7/2025 4/21/2025                    Recent Readings   SBP (mmHg) 140 145 Abnormally high  139  175 Abnormally high  130  144 Abnormally high  111   DBP (mmHg) 77 69 80  105 Abnormally high  59  104 Abnormally high  68   Pulse 62 66 58  58 56  54 66     Details              --------------------------------------------      Health Maintenance:  Health Maintenance         Date Due Completion Date    Shingles Vaccine (1 of 2) Never done ---    Pneumococcal Vaccines (Age 50+) (1 of 1 - PCV) Never done ---    RSV Vaccine (Age 60+ and Pregnant patients) (1 - Risk 60-74 years 1-dose series) Never done ---    Hemoglobin A1c (Prediabetes) 06/29/2025 6/29/2024    Mammogram 11/21/2025 11/21/2024    Colorectal Cancer Screening 12/12/2025 12/12/2020    DEXA Scan 03/01/2029 3/1/2024    Lipid Panel 06/29/2029 6/29/2024    TETANUS VACCINE 06/17/2034 6/17/2024            Health maintenance reviewed    Follow Up:  No follow-ups on file.    Discussed DDx, condition, and treatment.   Education sent to patient portal/included in after visit summary.  ED precautions given.   Notify provider if symptoms do not resolve or increase in severity.   Patient verbalizes understanding and agrees with plan of care.    Exam     Review of Systems:  (as noted above)  Review of Systems    Physical Exam:   Physical Exam  Constitutional:       General: She is not in acute distress.     Appearance: Normal appearance. She is obese. She is not toxic-appearing.   HENT:      Nose: Nose normal. No congestion.   Eyes:      General: Allergic shiner present.         Right eye: Hordeolum present.      Extraocular Movements: Extraocular movements intact.      Right eye: Normal extraocular motion.      Left eye: Normal extraocular motion.      Conjunctiva/sclera: Conjunctivae  "normal.      Right eye: Right conjunctiva is not injected. Exudate (scant/dried to upper lid) present. No chemosis or hemorrhage.  Cardiovascular:      Rate and Rhythm: Normal rate.      Pulses: Normal pulses.   Pulmonary:      Effort: Pulmonary effort is normal. No respiratory distress.      Breath sounds: Normal breath sounds.   Musculoskeletal:      Cervical back: Normal range of motion. No rigidity.   Lymphadenopathy:      Cervical: No cervical adenopathy.   Skin:     General: Skin is warm.      Capillary Refill: Capillary refill takes less than 2 seconds.   Neurological:      General: No focal deficit present.      Mental Status: She is alert and oriented to person, place, and time.   Psychiatric:         Mood and Affect: Mood normal.       Vitals:    06/23/25 1119   BP: (!) 142/88   BP Location: Left arm   Patient Position: Sitting   Pulse: (!) 56   SpO2: 97%   Weight: 84.1 kg (185 lb 6.5 oz)   Height: 5' 3" (1.6 m)      Body mass index is 32.84 kg/m².    Lab Results   Component Value Date    WBC 9.17 06/29/2024    HGB 11.4 (L) 06/29/2024    HCT 35.7 (L) 06/29/2024     06/29/2024    CHOL 236 (H) 06/29/2024    TRIG 69 06/29/2024    HDL 58 06/29/2024    ALT 14 06/29/2024    AST 20 06/29/2024     06/29/2024    K 3.4 (L) 06/29/2024     06/29/2024    CREATININE 0.9 06/29/2024    BUN 20 06/29/2024    CO2 21 (L) 06/29/2024    TSH 0.354 (L) 06/29/2024    HGBA1C 6.0 (H) 06/29/2024       The 10-year ASCVD risk score (Alma BROUSSARD, et al., 2019) is: 13.3%    Values used to calculate the score:      Age: 65 years      Sex: Female      Is Non- : Yes      Diabetic: No      Tobacco smoker: No      Systolic Blood Pressure: 142 mmHg      Is BP treated: Yes      HDL Cholesterol: 58 mg/dL      Total Cholesterol: 236 mg/dL    (Imaging have been independently reviewed)    History     Past Medical History:  Past Medical History:   Diagnosis Date    ALLERGIC RHINITIS     Anemia     Anxiety     " Cancer     right neck    Hypertension     Sprain of anterior talofibular ligament of left ankle 10/14/2023    Transient elevated blood pressure        Past Surgical History:  Past Surgical History:   Procedure Laterality Date    COLONOSCOPY N/A 12/12/2020    Procedure: COLONOSCOPY;  Surgeon: GHADA Gamez MD;  Location: [address]);  Service: Endoscopy;  Laterality: N/A;  Pt reports family Hx colon cancer (Pt's father )  prep ins. emailed - COVID screening on 12/9/20 [employer]- [facility]    COLPORRHAPHY, COMBINED ANTEROPOSTERIOR N/A 05/12/2023    Procedure: COLPORRHAPHY, COMBINED ANTEROPOSTERIOR;  Surgeon: Lulu Parham DO;  Location: [facility] OR;  Service: OB/GYN;  Laterality: N/A;    CYSTOSCOPY  05/12/2023    Procedure: CYSTOSCOPY;  Surgeon: Lulu Parham DO;  Location: [facility];  Service: OB/GYN;;    HYSTERECTOMY      LAPAROSCOPIC SUSPENSION OF UTEROSACRAL LIGAMENT N/A 05/12/2023    Procedure: SUSPENSION, LIGAMENT, UTEROSACRAL, LAPAROSCOPIC;  Surgeon: Lulu Parham DO;  Location: [facility];  Service: OB/GYN;  Laterality: N/A;    LAPAROSCOPIC TOTAL HYSTERECTOMY N/A 05/12/2023    Procedure: HYSTERECTOMY, TOTAL, LAPAROSCOPIC;  Surgeon: Lulu Parham DO;  Location: [facility];  Service: OB/GYN;  Laterality: N/A;  VNOTES / 4 HOURS    LYSIS OF ADHESIONS N/A 05/12/2023    Procedure: LYSIS, ADHESIONS;  Surgeon: Lulu Parham DO;  Location: [facility];  Service: OB/GYN;  Laterality: N/A;    SKIN BIOPSY      Surgical removal neck cancer Right     TUBAL LIGATION         Social History:  Social History[1]    Family History:  Family History   Problem Relation Name Age of Onset    Hypertension Mother Miya     Breast cancer Mother Miya     Cancer Sister Julisa Lawton     Colon cancer Neg Hx      Ovarian cancer Neg Hx         Allergies and Medications: (updated and reviewed)  Review of patient's allergies indicates:   Allergen Reactions    No known allergies      Current  Medications[2]    Patient Care Team:  Annette Holliday MD as PCP - General (Family Medicine)  2, [facility] [employer] [facility] as Hypertension Digital Medicine Contract  Annette Holliday MD as Hypertension Digital Medicine Responsible Provider (Family Medicine)  Kristen Navarrete, PharmD as Hypertension Digital Medicine Clinician  Sarah Cameron as          - The patient is given an After Visit Summary that lists all medications with directions, allergies, education, orders placed during this encounter and follow-up instructions.      - I have reviewed the patient's medical information including past medical, family, and social history sections including the medications and allergies.      - We discussed the patient's current medications.     This note was created by combination of typed  and MModal dictation.  Transcription errors may be present.  If there are any questions, please contact me.               [1]   Social History  Socioeconomic History    Marital status: Single    Number of children: 2    Years of education: 14 years   Occupational History    Occupation: [employer] manager     Employer: [id number]   Tobacco Use    Smoking status: Never     Passive exposure: Never    Smokeless tobacco: Never   Substance and Sexual Activity    Alcohol use: Not Currently     Comment: Social    Drug use: No    Sexual activity: Not Currently     Partners: Male     Birth control/protection: Surgical   Social History Narrative    Works at [employer] and does not exercise regularly     Social Drivers of Health     Financial Resource Strain: Medium Risk (5/27/2025)    Overall Financial Resource Strain (CARDIA)     Difficulty of Paying Living Expenses: Somewhat hard   Food Insecurity: Food Insecurity Present (5/27/2025)    Hunger Vital Sign     Worried About Running Out of Food in the Last Year: Sometimes true     Ran Out of Food in the Last Year: Sometimes true   Transportation Needs: No Transportation Needs  (5/27/2025)    PRAPARE - Transportation     Lack of Transportation (Medical): No     Lack of Transportation (Non-Medical): No   Physical Activity: Unknown (5/27/2025)    Exercise Vital Sign     Days of Exercise per Week: 1 day   Stress: No Stress Concern Present (5/27/2025)    [employer] - Occupational Stress Questionnaire     Feeling of Stress : Only a little   Housing Stability: High Risk (5/27/2025)    Housing Stability Vital Sign     Unable to Pay for Housing in the Last Year: Yes     Homeless in the Last Year: No   [2]   Current Outpatient Medications   Medication Sig Dispense Refill    estradioL (ESTRACE) 0.01 % (0.1 mg/gram) vaginal cream Place 0.5 g vaginally twice a week. Apply to the vagina daily for two weeks then twice a week. 45 g 11    fluticasone propionate (FLONASE) 50 mcg/actuation nasal spray Use 1 spray(s) in each nostril once daily 32 g 1    hydroCHLOROthiazide (HYDRODIURIL) 25 MG tablet Take 1 tablet by mouth once daily 90 tablet 0    ibuprofen (ADVIL,MOTRIN) 800 MG tablet TAKE 1 TABLET BY MOUTH THREE TIMES DAILY AS NEEDED FOR BACK PAIN 90 tablet 0    MULTIVITAMIN ORAL Take by mouth.      erythromycin (ROMYCIN) ophthalmic ointment Apply 1/4 inch ointment directly onto the lid margin of affected eye once daily at bedtime for two weeks 3.5 g 0     No current facility-administered medications for this visit.

## 2025-06-23 NOTE — ASSESSMENT & PLAN NOTE
Initial reading elevated but repeat improved.   Digital medicine  Compliant with meds  -hctz       6/9/2025 6/6/2025 5/29/2025 5/7/2025 4/21/2025                    Recent Readings   SBP (mmHg) 140 145 Abnormally high  139  175 Abnormally high  130  144 Abnormally high  111   DBP (mmHg) 77 69 80  105 Abnormally high  59  104 Abnormally high  68   Pulse 62 66 58  58 56  54 66     Details

## 2025-06-23 NOTE — PROGRESS NOTES
Stye to right eye, similar to years ago  Started Saturday night itchy rubbed eye overnight  Sunday morning tried warm compresses no improvement noticed swelling increased  Baby shampoo applied to this morning  Now itchy and painful severity 6/10.  Minimal drainage with small amount of crust on the inner eye  Reports tendency to pull eyelashes, possibly pulled before eyelash before stye occurred   Blurry vision and difficulty opening eye on awaking.

## 2025-06-24 DIAGNOSIS — L03.213 PERIORBITAL CELLULITIS OF RIGHT EYE: Primary | ICD-10-CM

## 2025-06-24 RX ORDER — CEPHALEXIN 250 MG/1
250 CAPSULE ORAL 4 TIMES DAILY
Qty: 28 CAPSULE | Refills: 0 | Status: SHIPPED | OUTPATIENT
Start: 2025-06-24 | End: 2025-07-01

## 2025-06-27 ENCOUNTER — PATIENT MESSAGE (OUTPATIENT)
Dept: ADMINISTRATIVE | Facility: OTHER | Age: 65
End: 2025-06-27
Payer: COMMERCIAL

## 2025-07-01 ENCOUNTER — LAB VISIT (OUTPATIENT)
Dept: LAB | Facility: HOSPITAL | Age: 65
End: 2025-07-01
Attending: FAMILY MEDICINE
Payer: COMMERCIAL

## 2025-07-01 ENCOUNTER — OFFICE VISIT (OUTPATIENT)
Dept: PRIMARY CARE CLINIC | Facility: CLINIC | Age: 65
End: 2025-07-01
Attending: FAMILY MEDICINE
Payer: COMMERCIAL

## 2025-07-01 VITALS
RESPIRATION RATE: 18 BRPM | OXYGEN SATURATION: 99 % | WEIGHT: 188.5 LBS | SYSTOLIC BLOOD PRESSURE: 136 MMHG | HEART RATE: 63 BPM | BODY MASS INDEX: 33.4 KG/M2 | HEIGHT: 63 IN | DIASTOLIC BLOOD PRESSURE: 84 MMHG

## 2025-07-01 DIAGNOSIS — Z00.00 ANNUAL PHYSICAL EXAM: ICD-10-CM

## 2025-07-01 DIAGNOSIS — Z00.00 ANNUAL PHYSICAL EXAM: Primary | ICD-10-CM

## 2025-07-01 DIAGNOSIS — I10 PRIMARY HYPERTENSION: ICD-10-CM

## 2025-07-01 DIAGNOSIS — Z23 NEED FOR PROPHYLACTIC VACCINATION AGAINST STREPTOCOCCUS PNEUMONIAE (PNEUMOCOCCUS): ICD-10-CM

## 2025-07-01 DIAGNOSIS — R73.9 ELEVATED BLOOD SUGAR: ICD-10-CM

## 2025-07-01 LAB
25(OH)D3+25(OH)D2 SERPL-MCNC: 41 NG/ML (ref 30–96)
ABSOLUTE EOSINOPHIL (OHS): 0.14 K/UL
ABSOLUTE MONOCYTE (OHS): 0.48 K/UL (ref 0.3–1)
ABSOLUTE NEUTROPHIL COUNT (OHS): 4.04 K/UL (ref 1.8–7.7)
ALBUMIN SERPL BCP-MCNC: 3.9 G/DL (ref 3.5–5.2)
ALP SERPL-CCNC: 71 UNIT/L (ref 40–150)
ALT SERPL W/O P-5'-P-CCNC: 19 UNIT/L (ref 10–44)
ANION GAP (OHS): 8 MMOL/L (ref 8–16)
AST SERPL-CCNC: 21 UNIT/L (ref 11–45)
BASOPHILS # BLD AUTO: 0.02 K/UL
BASOPHILS NFR BLD AUTO: 0.2 %
BILIRUB SERPL-MCNC: 0.4 MG/DL (ref 0.1–1)
BUN SERPL-MCNC: 12 MG/DL (ref 8–23)
CALCIUM SERPL-MCNC: 9.3 MG/DL (ref 8.7–10.5)
CHLORIDE SERPL-SCNC: 106 MMOL/L (ref 95–110)
CHOLEST SERPL-MCNC: 228 MG/DL (ref 120–199)
CHOLEST/HDLC SERPL: 3.5 {RATIO} (ref 2–5)
CO2 SERPL-SCNC: 26 MMOL/L (ref 23–29)
CREAT SERPL-MCNC: 0.7 MG/DL (ref 0.5–1.4)
EAG (OHS): 128 MG/DL (ref 68–131)
ERYTHROCYTE [DISTWIDTH] IN BLOOD BY AUTOMATED COUNT: 13.9 % (ref 11.5–14.5)
GFR SERPLBLD CREATININE-BSD FMLA CKD-EPI: >60 ML/MIN/1.73/M2
GLUCOSE SERPL-MCNC: 94 MG/DL (ref 70–110)
HBA1C MFR BLD: 6.1 % (ref 4–5.6)
HCT VFR BLD AUTO: 34.3 % (ref 37–48.5)
HDLC SERPL-MCNC: 65 MG/DL (ref 40–75)
HDLC SERPL: 28.5 % (ref 20–50)
HGB BLD-MCNC: 10.9 GM/DL (ref 12–16)
IMM GRANULOCYTES # BLD AUTO: 0.01 K/UL (ref 0–0.04)
IMM GRANULOCYTES NFR BLD AUTO: 0.1 % (ref 0–0.5)
LDLC SERPL CALC-MCNC: 150.8 MG/DL (ref 63–159)
LYMPHOCYTES # BLD AUTO: 3.41 K/UL (ref 1–4.8)
MCH RBC QN AUTO: 27.7 PG (ref 27–31)
MCHC RBC AUTO-ENTMCNC: 31.8 G/DL (ref 32–36)
MCV RBC AUTO: 87 FL (ref 82–98)
NONHDLC SERPL-MCNC: 163 MG/DL
NUCLEATED RBC (/100WBC) (OHS): 0 /100 WBC
PLATELET # BLD AUTO: 246 K/UL (ref 150–450)
PMV BLD AUTO: 10.6 FL (ref 9.2–12.9)
POTASSIUM SERPL-SCNC: 3.9 MMOL/L (ref 3.5–5.1)
PROT SERPL-MCNC: 7.3 GM/DL (ref 6–8.4)
RBC # BLD AUTO: 3.94 M/UL (ref 4–5.4)
RELATIVE EOSINOPHIL (OHS): 1.7 %
RELATIVE LYMPHOCYTE (OHS): 42.1 % (ref 18–48)
RELATIVE MONOCYTE (OHS): 5.9 % (ref 4–15)
RELATIVE NEUTROPHIL (OHS): 50 % (ref 38–73)
SODIUM SERPL-SCNC: 140 MMOL/L (ref 136–145)
TRIGL SERPL-MCNC: 61 MG/DL (ref 30–150)
TSH SERPL-ACNC: 3.97 UIU/ML (ref 0.4–4)
VIT B12 SERPL-MCNC: 1310 PG/ML (ref 210–950)
WBC # BLD AUTO: 8.1 K/UL (ref 3.9–12.7)

## 2025-07-01 PROCEDURE — 80061 LIPID PANEL: CPT

## 2025-07-01 PROCEDURE — 1101F PT FALLS ASSESS-DOCD LE1/YR: CPT | Mod: CPTII,S$GLB,, | Performed by: FAMILY MEDICINE

## 2025-07-01 PROCEDURE — 82607 VITAMIN B-12: CPT

## 2025-07-01 PROCEDURE — 80053 COMPREHEN METABOLIC PANEL: CPT

## 2025-07-01 PROCEDURE — 3008F BODY MASS INDEX DOCD: CPT | Mod: CPTII,S$GLB,, | Performed by: FAMILY MEDICINE

## 2025-07-01 PROCEDURE — 3079F DIAST BP 80-89 MM HG: CPT | Mod: CPTII,S$GLB,, | Performed by: FAMILY MEDICINE

## 2025-07-01 PROCEDURE — 36415 COLL VENOUS BLD VENIPUNCTURE: CPT | Mod: PN

## 2025-07-01 PROCEDURE — 4010F ACE/ARB THERAPY RXD/TAKEN: CPT | Mod: CPTII,S$GLB,, | Performed by: FAMILY MEDICINE

## 2025-07-01 PROCEDURE — 90471 IMMUNIZATION ADMIN: CPT | Mod: S$GLB,,, | Performed by: FAMILY MEDICINE

## 2025-07-01 PROCEDURE — 3075F SYST BP GE 130 - 139MM HG: CPT | Mod: CPTII,S$GLB,, | Performed by: FAMILY MEDICINE

## 2025-07-01 PROCEDURE — 90677 PCV20 VACCINE IM: CPT | Mod: S$GLB,,, | Performed by: FAMILY MEDICINE

## 2025-07-01 PROCEDURE — 1160F RVW MEDS BY RX/DR IN RCRD: CPT | Mod: CPTII,S$GLB,, | Performed by: FAMILY MEDICINE

## 2025-07-01 PROCEDURE — 99999 PR PBB SHADOW E&M-EST. PATIENT-LVL III: CPT | Mod: PBBFAC,,, | Performed by: FAMILY MEDICINE

## 2025-07-01 PROCEDURE — 83036 HEMOGLOBIN GLYCOSYLATED A1C: CPT

## 2025-07-01 PROCEDURE — 84443 ASSAY THYROID STIM HORMONE: CPT

## 2025-07-01 PROCEDURE — 85025 COMPLETE CBC W/AUTO DIFF WBC: CPT

## 2025-07-01 PROCEDURE — 3288F FALL RISK ASSESSMENT DOCD: CPT | Mod: CPTII,S$GLB,, | Performed by: FAMILY MEDICINE

## 2025-07-01 PROCEDURE — 1159F MED LIST DOCD IN RCRD: CPT | Mod: CPTII,S$GLB,, | Performed by: FAMILY MEDICINE

## 2025-07-01 PROCEDURE — 99397 PER PM REEVAL EST PAT 65+ YR: CPT | Mod: 25,S$GLB,, | Performed by: FAMILY MEDICINE

## 2025-07-01 PROCEDURE — 82306 VITAMIN D 25 HYDROXY: CPT

## 2025-07-01 NOTE — PROGRESS NOTES
Patient ID: Irene Lawton is a 65 y.o. female.    Chief Complaint: Annual Exam    History of Present Illness    CHIEF COMPLAINT:  Patient presents today for follow up.    HYPERTENSION:  She reports experiencing significant stress currently. She denies headaches or blurry vision associated with hypertension. She continues hydrochlorothiazide for blood pressure management. She has been prescribed olmesartan (Benicar) 10 mg but has not yet started.    SINUS SYMPTOMS:  She reports sinus congestion with feeling stopped up and clogged. Currently using Flonase nasal spray. She avoids additional medications due to concerns about drowsiness during work hours. She denies associated headaches. She is seeking daytime medication options that will not cause drowsiness.    RECENT EYE CONDITION:  She reports a recent stye that caused her to miss three days of work. The condition began after unconsciously pulling and rubbing her eyelashes during sleep on a Saturday night, was most severe on Monday, and has since significantly improved. She has stopped the eyelash-pulling behavior.    EXERCISE AND JOINT PAIN:  She recently resumed walking. She participated in water aerobics in June, noting significant improvement in knee pain with reduced leg and knee aching, but discontinued two weeks ago due to eye condition concerns. She expressed interest in returning to water aerobics, recognizing its low-impact benefits on joints. She is currently seeking to increase exercise for weight management.    CURRENT MEDICATIONS:  She continues statin 20 mg, hydrochlorothiazide, and Flonase nasal spray.      ROS:  General: -fever, -chills, -fatigue, -weight gain, -weight loss  Eyes: -vision changes, -redness, -discharge  ENT: -ear pain, +nasal congestion, -sore throat  Cardiovascular: -chest pain, -palpitations, +lower extremity edema  Respiratory: -cough, -shortness of breath  Gastrointestinal: -abdominal pain, -nausea, -vomiting, -diarrhea,  -constipation, -blood in stool  Genitourinary: -dysuria, -hematuria, -frequency  Musculoskeletal: -joint pain, -muscle pain  Skin: -rash, -lesion  Neurological: -headache, -dizziness, -numbness, -tingling  Psychiatric: -anxiety, -depression, -sleep difficulty, +nervousness, +inner restlessness         Physical Exam    Vitals: Blood pressure: 175/91. Hypertensive.  General: No acute distress. Well-developed. Well-nourished.  Eyes: EOMI. Sclerae anicteric.  HENT: Normocephalic. Atraumatic. Nares patent. Moist oral mucosa.  Ears: Fluid in ears. Bilateral EACs clear.  Cardiovascular: Regular rate. Regular rhythm. No murmurs. No rubs. No gallops. Normal S1, S2.  Respiratory: Normal respiratory effort. Clear to auscultation bilaterally. No rales. No rhonchi. No wheezing.  Abdomen: Soft. Non-tender. Non-distended. Normoactive bowel sounds.  Musculoskeletal: No  obvious deformity.  Extremities: Mild edema in legs.  Neurological: Alert & oriented x3. No slurred speech. Normal gait.  Psychiatric: Normal mood. Normal affect. Good insight. Good judgment.  Skin: Warm. Dry. No rash.         Assessment & Plan    I10 Essential (primary) hypertension  J31.0 Chronic rhinitis  H00.019 Hordeolum externum unspecified eye, unspecified eyelid  M25.569 Pain in unspecified knee  R60.0 Localized edema  H65.90 Unspecified nonsuppurative otitis media, unspecified ear    ## HYPERTENSION:  - Measured blood pressure at 175/91, indicating elevated levels with fluid retention in legs.  - Prescribed olmesartan 20 mg daily (half of a 40 mg tablet) and continued hydrochlorothiazide.  - Discussed potential increase of olmesartan dosage to 40 mg if BP remains elevated.  - Explained that most patients with hypertension require 2-3 medications for adequate control.  - Scheduled follow-up nurse visit in 3 weeks to check BP after initiating olmesartan.  - Ordered annual labs including cholesterol and A1C.    ## CHRONIC RHINITIS:  - Patient reports sinus  congestion and is currently using Flonase.  - Discussed antihistamines and their potential effects on drowsiness.  - Prescribed Allegra for sinus congestion relief.    ## HORDEOLUM (STYE):  - Patient had a stye on the eye which has improved significantly after discontinuing water aerobics.    ## KNEE PAIN:  - Patient reports knee pain improvement after initiating water aerobics.  - Explained that water aerobics is beneficial for joints and especially recommended by orthopedic surgeons for older adults.  - Advised patient to resume water aerobics exercises.    ## OTITIS MEDIA:  - Observed significant congestion and fluid in the ear during exam, indicating possible otitis media.    ## GENERAL HEALTH MAINTENANCE:  - Explained importance of shingles vaccine, noting it causes 24 hours of discomfort but prevents weeks or months of misery.  - Administered pneumonia vaccine in office.  - Patient to continue walking for exercise.          Visit today is associated with current or anticipated ongoing medical care related to this patient's single serious condition/complex condition of HTN. The patient will return to see me as these issues will be followed longitudinally      RTC 3  mos and nurse visit BP check in 3 weeks    This note was generated with the assistance of ambient listening technology. Verbal consent was obtained by the patient and accompanying visitor(s) for the recording of patient appointment to facilitate this note. I attest to having reviewed and edited the generated note for accuracy, though some syntax or spelling errors may persist. Please contact the author of this note for any clarification.        Answers submitted by the patient for this visit:  Review of Systems Questionnaire (Submitted on 6/30/2025)  activity change: Yes  unexpected weight change: Yes  neck pain: No  hearing loss: No  rhinorrhea: No  trouble swallowing: No  eye discharge: No  visual disturbance: No  chest tightness: No  wheezing:  No  chest pain: No  palpitations: No  blood in stool: No  constipation: No  vomiting: No  diarrhea: No  polydipsia: No  polyuria: No  difficulty urinating: No  hematuria: No  menstrual problem: No  dysuria: No  joint swelling: No  arthralgias: No  headaches: No  weakness: No  confusion: No  dysphoric mood: Yes

## 2025-07-02 ENCOUNTER — RESULTS FOLLOW-UP (OUTPATIENT)
Dept: PRIMARY CARE CLINIC | Facility: CLINIC | Age: 65
End: 2025-07-02

## 2025-07-08 DIAGNOSIS — M54.9 BACK PAIN, UNSPECIFIED BACK LOCATION, UNSPECIFIED BACK PAIN LATERALITY, UNSPECIFIED CHRONICITY: ICD-10-CM

## 2025-07-08 NOTE — TELEPHONE ENCOUNTER
No care due was identified.  [facility] Embedded Care Due Messages. Reference number: [id number].   7/08/2025 3:53:15 PM CDT

## 2025-07-08 NOTE — TELEPHONE ENCOUNTER
Refill Encounter    PCP Visits: Recent Visits  Date Type Provider Dept   07/07/25 Appointment Annette Holliday MD [facility]   07/01/25 Office Visit Annette Holliday MD [facility]   12/11/24 Office Visit Annette Holliday MD [facility]   Showing recent visits within past 360 days and meeting all other requirements  Future Appointments  Date Type Provider Dept   10/01/25 Appointment Annette Holliday MD [facility]   Showing future appointments within next 720 days and meeting all other requirements      Last 3 Blood Pressure:   BP Readings from Last 3 Encounters:   07/01/25 136/84   06/23/25 (!) 142/88   06/04/25 (!) 168/88     Preferred Pharmacy:   [facility] [place] - [address]  [address] [place]) [place] [address]  Phone: [phone] Fax: [phone]    [facility] [address] [address]  Phone: [phone] Fax: [phone]    Requested RX:  Requested Prescriptions     Pending Prescriptions Disp Refills    ibuprofen (ADVIL,MOTRIN) 800 MG tablet [Pharmacy Med Name: Ibuprofen 800 MG Oral Tablet] 90 tablet 0     Sig: TAKE 1 TABLET BY MOUTH THREE TIMES DAILY AS NEEDED FOR BACK PAIN      RX Route: Normal

## 2025-07-09 ENCOUNTER — PATIENT OUTREACH (OUTPATIENT)
Dept: OTHER | Facility: OTHER | Age: 65
End: 2025-07-09
Payer: COMMERCIAL

## 2025-07-09 RX ORDER — IBUPROFEN 800 MG/1
TABLET, FILM COATED ORAL
Qty: 30 TABLET | Refills: 1 | Status: SHIPPED | OUTPATIENT
Start: 2025-07-09

## 2025-07-15 ENCOUNTER — PATIENT OUTREACH (OUTPATIENT)
Dept: OTHER | Facility: OTHER | Age: 65
End: 2025-07-15
Payer: COMMERCIAL

## 2025-07-18 ENCOUNTER — PATIENT OUTREACH (OUTPATIENT)
Dept: OTHER | Facility: OTHER | Age: 65
End: 2025-07-18
Payer: COMMERCIAL

## 2025-07-22 ENCOUNTER — CLINICAL SUPPORT (OUTPATIENT)
Dept: FAMILY MEDICINE | Facility: CLINIC | Age: 65
End: 2025-07-22
Payer: COMMERCIAL

## 2025-07-22 ENCOUNTER — TELEPHONE (OUTPATIENT)
Dept: PRIMARY CARE CLINIC | Facility: CLINIC | Age: 65
End: 2025-07-22
Payer: COMMERCIAL

## 2025-07-22 VITALS — SYSTOLIC BLOOD PRESSURE: 146 MMHG | OXYGEN SATURATION: 98 % | DIASTOLIC BLOOD PRESSURE: 84 MMHG | HEART RATE: 66 BPM

## 2025-07-22 DIAGNOSIS — I10 PRIMARY HYPERTENSION: Primary | ICD-10-CM

## 2025-07-22 PROCEDURE — 99999 PR PBB SHADOW E&M-EST. PATIENT-LVL III: CPT | Mod: PBBFAC,,,

## 2025-07-22 NOTE — TELEPHONE ENCOUNTER
Patient in clinic for bp check, manual bp 1456/84 P66      Patient denies headache, chest pain, dizziness, sob and vision issues,     patient reports that she is digital medicine    Patient reports she takes all prescription medication as ordered       Message sent to covering for Dr Holliday

## 2025-07-22 NOTE — PROGRESS NOTES
Irene Lawton 65 y.o. female is here for Blood Pressure check. in person    Manual Blood pressure reading was  146/84, Pulse 66. (Checked at the beginning of the visit)    If high, was it repeated after 15 minutes? yes    Pt's Home blood pressure machine read in office /, Pulse .     Diagnosed with Hypertension yes.    Patient took blood pressure medication today yes.  Last dose of blood pressure medication was taken this am, patient took benicar and hctz.     All Medications and OTC medication updated yes    Does patient have record of home blood pressure readings / Blood Pressure Log no. Patient on digital medicine    Does the pt have any complaints today in regards to their blood pressure medication? no. Complains of nothing. Patient is asymptomatic.     Were you sitting still for 5-10 minutes prior to taking your Blood pressure? yes     Has your blood pressure monitor ever been checked? no When was last time we checked your blood pressure monitor?     Updated vitals yes        Creatinine   Date Value Ref Range Status   07/01/2025 0.7 0.5 - 1.4 mg/dL Final     Sodium   Date Value Ref Range Status   07/01/2025 140 136 - 145 mmol/L Final   06/29/2024 137 136 - 145 mmol/L Final     Potassium   Date Value Ref Range Status   07/01/2025 3.9 3.5 - 5.1 mmol/L Final   06/29/2024 3.4 (L) 3.5 - 5.1 mmol/L Final      Manual Blood pressure reading was  /, Pulse . (Checked at the end of the visit)    If high, was it repeated after 15 minutes? yes      Follow up date is 10/01/2025.     Dr. Holliday notified.

## 2025-07-23 NOTE — TELEPHONE ENCOUNTER
Please call Irene Lawton and I recommend increasing Olmesartan 20 mg 1/2 tab daily to 1 tab daily    Dr. Nevaeh Lofton D.O.   Family Medicine

## 2025-07-23 NOTE — TELEPHONE ENCOUNTER
Spoke with patient to relay copied message below fro Dr Lofton regarding  Olmesartan increase from 10 mg to 20 mg      Patient reports that Velu had asked her in increase medication to 20 mg but she hadn't done it yet but she will now                Please call Irene Lawton and I recommend increasing Olmesartan 20 mg 1/2 tab daily to 1 tab daily     Dr. Nevaeh Lofton D.O.

## 2025-07-25 ENCOUNTER — PATIENT OUTREACH (OUTPATIENT)
Dept: OTHER | Facility: OTHER | Age: 65
End: 2025-07-25
Payer: COMMERCIAL

## 2025-07-30 DIAGNOSIS — I10 PRIMARY HYPERTENSION: ICD-10-CM

## 2025-07-30 RX ORDER — OLMESARTAN MEDOXOMIL 20 MG/1
10 TABLET ORAL DAILY
Qty: 30 TABLET | Refills: 1 | Status: SHIPPED | OUTPATIENT
Start: 2025-07-30 | End: 2025-07-31

## 2025-07-30 NOTE — TELEPHONE ENCOUNTER
Refill Encounter    PCP Visits: Recent Visits  Date Type Provider Dept   07/01/25 Office Visit Annette Holliday MD [facility]   12/11/24 Office Visit Annette Holliday MD [facility]   Showing recent visits within past 360 days and meeting all other requirements  Future Appointments  Date Type Provider Dept   10/01/25 Appointment Annette Holliday MD [facility]   Showing future appointments within next 720 days and meeting all other requirements      Last 3 Blood Pressure:   BP Readings from Last 3 Encounters:   07/22/25 (!) 146/84   07/01/25 136/84   06/23/25 (!) 142/88     Preferred Pharmacy:   [facility] [place] - [address]  [address] [place] [address]  Phone: [phone] Fax: [phone]      Requested RX:  Requested Prescriptions     Pending Prescriptions Disp Refills    olmesartan (BENICAR) 20 MG tablet 30 tablet 1     Sig: Take 0.5 tablets (10 mg total) by mouth once daily.      RX Route: Normal

## 2025-07-30 NOTE — TELEPHONE ENCOUNTER
No care due was identified.  [facility] Embedded Care Due Messages. Reference number: [id number].   7/30/2025 10:51:48 AM CDT

## 2025-07-31 ENCOUNTER — PATIENT OUTREACH (OUTPATIENT)
Dept: OTHER | Facility: OTHER | Age: 65
End: 2025-07-31
Payer: COMMERCIAL

## 2025-07-31 ENCOUNTER — PATIENT MESSAGE (OUTPATIENT)
Dept: PRIMARY CARE CLINIC | Facility: CLINIC | Age: 65
End: 2025-07-31
Payer: COMMERCIAL

## 2025-07-31 DIAGNOSIS — I10 PRIMARY HYPERTENSION: ICD-10-CM

## 2025-07-31 RX ORDER — OLMESARTAN MEDOXOMIL 20 MG/1
10 TABLET ORAL
Qty: 30 TABLET | Refills: 1 | OUTPATIENT
Start: 2025-07-31

## 2025-07-31 RX ORDER — OLMESARTAN MEDOXOMIL 20 MG/1
20 TABLET ORAL DAILY
Qty: 90 TABLET | Refills: 0 | Status: SHIPPED | OUTPATIENT
Start: 2025-07-31 | End: 2025-10-29

## 2025-07-31 NOTE — TELEPHONE ENCOUNTER
No care due was identified.  [facility] Embedded Care Due Messages. Reference number: [id number].   7/31/2025 1:24:54 PM CDT

## 2025-07-31 NOTE — TELEPHONE ENCOUNTER
Orders Placed This Encounter    olmesartan (BENICAR) 20 MG tablet         [id number] - [place] - [address]  [address] [place] [place] [address]  Phone: [phone] Fax: [phone]

## 2025-07-31 NOTE — PROGRESS NOTES
Connected Back: Patient Outreach    Non-Adherence - no response from pt for greater than 30 days. Closing program episode.

## 2025-07-31 NOTE — TELEPHONE ENCOUNTER
Called and spoke to patient to let her know that her medication was sent to her pharmacy of her request

## 2025-07-31 NOTE — TELEPHONE ENCOUNTER
Called and spoke with Ms.Jered in regards to her medication refill she states  sent in her refill of her Olmesartan 20 mg but it stated for her to to take 0.5 which is have but she stated  up the medication to 20 mg once daily she's asking that a new script  Olmesartan be sent to the pharmacy to say 20 mg once daily

## 2025-08-07 ENCOUNTER — OFFICE VISIT (OUTPATIENT)
Dept: OPTOMETRY | Facility: CLINIC | Age: 65
End: 2025-08-07
Payer: COMMERCIAL

## 2025-08-07 ENCOUNTER — PATIENT MESSAGE (OUTPATIENT)
Dept: ADMINISTRATIVE | Facility: OTHER | Age: 65
End: 2025-08-07
Payer: COMMERCIAL

## 2025-08-07 DIAGNOSIS — H00.021 HORDEOLUM INTERNUM OF RIGHT UPPER EYELID: Primary | ICD-10-CM

## 2025-08-07 DIAGNOSIS — H01.02B SQUAMOUS BLEPHARITIS OF UPPER AND LOWER EYELIDS OF BOTH EYES: ICD-10-CM

## 2025-08-07 DIAGNOSIS — H01.02A SQUAMOUS BLEPHARITIS OF UPPER AND LOWER EYELIDS OF BOTH EYES: ICD-10-CM

## 2025-08-07 PROCEDURE — 99214 OFFICE O/P EST MOD 30 MIN: CPT | Mod: S$GLB,,, | Performed by: OPTOMETRIST

## 2025-08-07 PROCEDURE — 3044F HG A1C LEVEL LT 7.0%: CPT | Mod: CPTII,S$GLB,, | Performed by: OPTOMETRIST

## 2025-08-07 PROCEDURE — 3288F FALL RISK ASSESSMENT DOCD: CPT | Mod: CPTII,S$GLB,, | Performed by: OPTOMETRIST

## 2025-08-07 PROCEDURE — 99999 PR PBB SHADOW E&M-EST. PATIENT-LVL III: CPT | Mod: PBBFAC,,, | Performed by: OPTOMETRIST

## 2025-08-07 PROCEDURE — 1159F MED LIST DOCD IN RCRD: CPT | Mod: CPTII,S$GLB,, | Performed by: OPTOMETRIST

## 2025-08-07 PROCEDURE — 4010F ACE/ARB THERAPY RXD/TAKEN: CPT | Mod: CPTII,S$GLB,, | Performed by: OPTOMETRIST

## 2025-08-07 PROCEDURE — 1101F PT FALLS ASSESS-DOCD LE1/YR: CPT | Mod: CPTII,S$GLB,, | Performed by: OPTOMETRIST

## 2025-08-07 RX ORDER — DOXYCYCLINE 100 MG/1
100 CAPSULE ORAL EVERY 12 HOURS
Qty: 28 CAPSULE | Refills: 0 | Status: SHIPPED | OUTPATIENT
Start: 2025-08-07 | End: 2025-08-21

## 2025-08-07 NOTE — PROGRESS NOTES
HPI    VIKTORIA: 12/6/2023  Chief complaint (CC): patient here today for urgent stye visit   Has reocurring stye of RUL that has not gone away with compresses ,   ointment or drops   Glasses? 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 +  A1c? Hemoglobin A1C           Date                     Value               Ref Range             Status                07/01/2025               6.1 (H)             4.0 - 5.6 %           Final              Last edited by Solomon Noel on 8/7/2025  9:30 AM.            Assessment /Plan     For exam results, see Encounter Report.    Hordeolum internum of right upper eyelid  -     doxycycline (MONODOX) 100 MG capsule; Take 1 capsule (100 mg total) by mouth every 12 (twelve) hours. for 14 days  Dispense: 28 capsule; Refill: 0  -Doxycycline BID x 14 days  -Hot Compresses    Squamous blepharitis of upper and lower eyelids of both eyes  -Nightly lid scrubs using Ocusoft. Systane Complete PF as needed.  -Chronic, long term management      RTC 1 yr

## 2025-09-04 RX ORDER — HYDROCHLOROTHIAZIDE 25 MG/1
25 TABLET ORAL
Qty: 90 TABLET | Refills: 0 | Status: SHIPPED | OUTPATIENT
Start: 2025-09-04

## (undated) DEVICE — SYR 50CC LL

## (undated) DEVICE — PAD ABD 8X10 STERILE

## (undated) DEVICE — SPONGE GAUZE 16PLY 4X4

## (undated) DEVICE — CAUTERY BOVIE PENCIL

## (undated) DEVICE — DRESSING GZ SURGICOUNT 4X8

## (undated) DEVICE — GLOVE BIOGEL SKINSENSE PI 6.0

## (undated) DEVICE — SUT VICRYL 0 27 CT-2

## (undated) DEVICE — VIDEO RENTAL SERVICE CYSTO

## (undated) DEVICE — SOL POVIDONE SCRUB IODINE 4 OZ

## (undated) DEVICE — SOL NORMAL USPCA 0.9%

## (undated) DEVICE — GOWN NONREINF SET-IN SLV XL

## (undated) DEVICE — SET CYSTO IRR DRP CHMBR 84IN

## (undated) DEVICE — SUT 0 VICRYL PLUS CT-1 27IN

## (undated) DEVICE — Device

## (undated) DEVICE — BANDAGE KERLIX P/P 2.25IN STER

## (undated) DEVICE — TIP YANKAUERS BULB NO VENT

## (undated) DEVICE — VIDEO RENTAL SERVICE

## (undated) DEVICE — GAUZE DERMACEA LOW PLY 3X4YRDS

## (undated) DEVICE — SUT MCRYL PLUS 4-0 PS2 27IN

## (undated) DEVICE — GLOVE BIOGEL SKINSENSE PI 6.5

## (undated) DEVICE — SUT PROLENE 0 MO6 30IN BLUE

## (undated) DEVICE — NDL MAYO CAT 1/2 CIR #5

## (undated) DEVICE — SOL IRRI STRL WATER 1000ML

## (undated) DEVICE — SEE MEDLINE ITEM 157196

## (undated) DEVICE — SUT 2/0 27IN PDS II VIO MO

## (undated) DEVICE — TROCAR KII FIOS 5MM X 100MM

## (undated) DEVICE — SOL POVIDONE PREP IODINE 4 OZ

## (undated) DEVICE — KIT WING PAD POSITIONING

## (undated) DEVICE — ELECTRODE REM PLYHSV RETURN 9

## (undated) DEVICE — SOL IRR SOD CHL .9% POUR

## (undated) DEVICE — SYR 10CC LUER LOCK

## (undated) DEVICE — GLOVE BIOGEL PI MICRO SZ 7

## (undated) DEVICE — SCISSOR 5MMX35CM DIRECT DRIVE

## (undated) DEVICE — TROCAR ENDO KII FIOS 12X100MM

## (undated) DEVICE — SEALER LIGASURE LAP 37CM 5MM

## (undated) DEVICE — SYS SEE SHARP SCP ANTIFG LNG

## (undated) DEVICE — SUT 3/0 27IN PDS II VIO MO

## (undated) DEVICE — SUT PDS II O CT-2 VIL MONO

## (undated) DEVICE — SUT VICRYL PLUS 0 CT1 36IN

## (undated) DEVICE — BANDAGE GAUZE 6PLY FLUFF 2X3

## (undated) DEVICE — IRRIGATOR ENDOSCOPY DISP.

## (undated) DEVICE — SET TRI-LUMEN FILTERED TUBE

## (undated) DEVICE — CLOSURE SKIN STERI STRIP 1/4X4

## (undated) DEVICE — PAD PREP CUFFED NS 24X48IN

## (undated) DEVICE — BLADE SURG STAINLESS STEEL #11

## (undated) DEVICE — BLADE SURG STAINLESS STEEL #10

## (undated) DEVICE — DRAPE UND BUTT W/POUCH 40X44IN

## (undated) DEVICE — UNDERGLOVES BIOGEL PI SZ 7 LF

## (undated) DEVICE — SUT PDS II 0 CT VIL MONO 36

## (undated) DEVICE — NDL INSUFFLATION VERRES 120MM

## (undated) DEVICE — PORT AIRSEAL 12/120MM LPI

## (undated) DEVICE — SUT CTD VICRYL BR CR/CT-2

## (undated) DEVICE — BLADE SURG CARBON STEEL #10

## (undated) DEVICE — ELECTRODE BLADE TEFLON 6

## (undated) DEVICE — NDL HYPO REG 25G X 1 1/2

## (undated) DEVICE — GLOVE BIOGEL SKINSENSE PI 7.0

## (undated) DEVICE — KIT GELPOINT TRNSVAG ACC 9.5CM

## (undated) DEVICE — BANDAGE ADH SOFT FLEX 1X3

## (undated) DEVICE — TOWEL OR DISP STRL BLUE 4/PK

## (undated) DEVICE — PACK LAPAROSCOPY BAPTIST

## (undated) DEVICE — TROCAR KII FIOS 11MM X 100MM

## (undated) DEVICE — PAD CURITY MATERNITY PERI

## (undated) DEVICE — TUBING SUC UNIV W/CONN 12FT

## (undated) DEVICE — JELLY SURGILUBE 5GR